# Patient Record
Sex: FEMALE
[De-identification: names, ages, dates, MRNs, and addresses within clinical notes are randomized per-mention and may not be internally consistent; named-entity substitution may affect disease eponyms.]

---

## 2017-06-12 ENCOUNTER — HOSPITAL (OUTPATIENT)
Dept: OTHER | Age: 62
End: 2017-06-12
Attending: FAMILY MEDICINE

## 2017-06-29 ENCOUNTER — HOSPITAL (OUTPATIENT)
Dept: OTHER | Age: 62
End: 2017-06-29
Attending: FAMILY MEDICINE

## 2017-10-05 ENCOUNTER — HOSPITAL (OUTPATIENT)
Dept: OTHER | Age: 62
End: 2017-10-05
Attending: INTERNAL MEDICINE

## 2018-02-09 ENCOUNTER — HOSPITAL (OUTPATIENT)
Dept: OTHER | Age: 63
End: 2018-02-09
Attending: INTERNAL MEDICINE

## 2018-09-06 ENCOUNTER — TELEPHONE (OUTPATIENT)
Dept: INTERNAL MEDICINE | Facility: CLINIC | Age: 63
End: 2018-09-06

## 2018-09-06 NOTE — TELEPHONE ENCOUNTER
----- Message from Ayana Woodard sent at 9/6/2018  4:00 PM CDT -----  Contact: SIDDHARTH LAZO [[id number]]            Name of Who is Calling: SIDDHARTH LAZO [[id number]]      What is the request in detail: patient would like to est care with doctor       Can the clinic reply by [facility]: no    What Number to Call Back if not [facility]: [phone]

## 2018-10-12 ENCOUNTER — OFFICE VISIT (OUTPATIENT)
Dept: INTERNAL MEDICINE | Facility: CLINIC | Age: 63
End: 2018-10-12
Attending: INTERNAL MEDICINE
Payer: MEDICARE

## 2018-10-12 VITALS
WEIGHT: 170.63 LBS | BODY MASS INDEX: 24.43 KG/M2 | DIASTOLIC BLOOD PRESSURE: 66 MMHG | HEART RATE: 80 BPM | HEIGHT: 70 IN | SYSTOLIC BLOOD PRESSURE: 123 MMHG | OXYGEN SATURATION: 97 %

## 2018-10-12 DIAGNOSIS — N95.9 MENOPAUSAL PROBLEM: ICD-10-CM

## 2018-10-12 DIAGNOSIS — Z12.31 ENCOUNTER FOR SCREENING MAMMOGRAM FOR MALIGNANT NEOPLASM OF BREAST: ICD-10-CM

## 2018-10-12 DIAGNOSIS — Z13.220 ENCOUNTER FOR LIPID SCREENING FOR CARDIOVASCULAR DISEASE: ICD-10-CM

## 2018-10-12 DIAGNOSIS — Z13.6 ENCOUNTER FOR LIPID SCREENING FOR CARDIOVASCULAR DISEASE: ICD-10-CM

## 2018-10-12 DIAGNOSIS — M85.80 OSTEOPENIA, UNSPECIFIED LOCATION: Primary | ICD-10-CM

## 2018-10-12 DIAGNOSIS — F33.42 RECURRENT MAJOR DEPRESSIVE DISORDER, IN FULL REMISSION: ICD-10-CM

## 2018-10-12 PROCEDURE — 99204 OFFICE O/P NEW MOD 45 MIN: CPT | Mod: S$PBB,,, | Performed by: INTERNAL MEDICINE

## 2018-10-12 PROCEDURE — 99214 OFFICE O/P EST MOD 30 MIN: CPT | Mod: PBBFAC | Performed by: INTERNAL MEDICINE

## 2018-10-12 PROCEDURE — 99999 PR PBB SHADOW E&M-EST. PATIENT-LVL IV: CPT | Mod: PBBFAC,,, | Performed by: INTERNAL MEDICINE

## 2018-10-12 RX ORDER — VENLAFAXINE HYDROCHLORIDE 150 MG/1
150 CAPSULE, EXTENDED RELEASE ORAL DAILY
Qty: 90 CAPSULE | Refills: 3 | Status: SHIPPED | OUTPATIENT
Start: 2018-10-12 | End: 2019-09-11 | Stop reason: SDUPTHER

## 2018-10-12 RX ORDER — VENLAFAXINE HYDROCHLORIDE 150 MG/1
150 CAPSULE, EXTENDED RELEASE ORAL DAILY
COMMUNITY
End: 2018-10-12 | Stop reason: SDUPTHER

## 2018-10-12 NOTE — PROGRESS NOTES
"Subjective:   Patient ID: Arben De Luna is a 63 y.o. female  Chief complaint:   Chief Complaint   Patient presents with    [facility]       HPI    Here to [facility] and for annual exam     Osteopenia: taking ca/vit d -- less exercise but working to improve this   - was on fosamax in past for short period of time - tez but concerned about pot SE - opted to stop Rx and manage as above     Depression: doing well on effexor - taking rx for a few years - no si/hi/rivera    Thinks utd on hep c screening   Tdap: due  Influenza: due  Pneumonia: pneumovax 23 and prevnar 13 - thinks had prevnar 13 recently   Shingles: due for shingrix    OA of knee and shoulders right > left - stable  Has right shoulder pain - stable     Review of Systems    Objective:  Vitals:    10/12/18 1302   BP: 123/66   Pulse: 80   SpO2: 97%   Weight: 77.4 kg (170 lb 10.2 oz)   Height: 5' 10" (1.778 m)     Body mass index is 24.48 kg/m².    Physical Exam   Constitutional: She is oriented to person, place, and time. She appears well-developed and well-nourished.   HENT:   Head: Normocephalic and atraumatic.   Right Ear: External ear normal.   Left Ear: External ear normal.   Nose: Nose normal.   Mouth/Throat: Oropharynx is clear and moist. No oropharyngeal exudate.   No carotid bruits   Eyes: Conjunctivae and EOM are normal.   Neck: Neck supple. No thyromegaly present.   Cardiovascular: Normal rate, regular rhythm, normal heart sounds and intact distal pulses.   Pulmonary/Chest: Effort normal and breath sounds normal.   Abdominal: Soft. Bowel sounds are normal.   Musculoskeletal: She exhibits no edema or tenderness.   Lymphadenopathy:     She has no cervical adenopathy.   Neurological: She is alert and oriented to person, place, and time.   Skin: Skin is warm and dry.   Psychiatric: Her behavior is normal. Thought content normal.   Vitals reviewed.      Assessment:  1. Annual physical exam    2. Osteopenia, unspecified location    3. Encounter for lipid " screening for cardiovascular disease    4. Recurrent major depressive disorder, in full remission    5. Menopausal problem    6. Encounter for screening mammogram for malignant neoplasm of breast        Plan:  Arben was seen today for establish care.    Diagnoses and all orders for this visit:    Annual physical exam  Recommend daily sunscreen, cardiovascular exercise min 30 min 5 days per week. Seatbelts routinely.    Osteopenia, unspecified location  -     Vitamin D; Future  -     TSH; Future  -     CBC auto differential; Future  -     Comprehensive metabolic panel; Future  -     DXA Bone Density Spine And Hip; Future  rec otc supplement of calcium 1200mg and vit d 800u divided into 2 doses daily along with reg exercise    Encounter for lipid screening for cardiovascular disease  -     TSH; Future  -     Lipid panel; Future    Recurrent major depressive disorder, in full remission  -     TSH; Future  Cont med, controlled     Menopausal problem  -     DXA Bone Density Spine And Hip; Future    Encounter for screening mammogram for malignant neoplasm of breast  -     Mammo Digital Screening Bilat with Tomosynthesis CAD; Future    Other orders  -     venlafaxine (EFFEXOR-XR) 150 MG Cp24; Take 1 capsule (150 mg total) by mouth once daily.      Health Maintenance   Topic Date Due    Hepatitis C Screening  1955    Lipid Panel  1955    TETANUS VACCINE  02/16/1971    Mammogram  02/16/1993    DEXA SCAN  02/16/1993    Zoster Vaccine  02/16/2013    Pneumococcal (65+) (1 of 2 - PCV13) 02/16/2018    Influenza Vaccine  08/01/2018    Colonoscopy  06/13/2028

## 2018-10-12 NOTE — PATIENT INSTRUCTIONS
Use a cotton ball dipped in mineral oil and place in the external canal for 10 to 20 minutes once per week (combined with eight hours of not using a hearing aid overnight, if applicable). This helps to liquefy the cerumen and aid the normal elimination mechanisms, thereby potentially reducing the number of visits per year for cerumen removal.  Routine cleaning of the ears by a health professional every 6 to 12 months is also suggested    Vaccines:  Tdap: due  Influenza: due  Pneumonia: pneumovax 23 and prevnar 13 - thinks had prevnar 13 recently   Shingles: shingrix     For constipation:   Increase fluids, fiber, exercise  Colace 100mg 1-2 times per day, miralax as needed   High-Fiber Diet  Fiber is in fruits, vegetables, cereals, and grains. Fiber passes through your body undigested. A high-fiber diet helps food move through your intestinal tract. The added bulk is helpful in preventing constipation. In people with diverticulosis, fiber helps clean out the pouches along the colon wall. It also prevents new pouches from forming. A high-fiber diet reduces the risk of colon cancer. It also lowers blood cholesterol and prevents high blood sugar in people with diabetes.    The fiber-rich foods listed below should be part of your diet. If you are not used to high-fiber foods, start with 1 or 2 foods from this list. Every 3 to 4 days add a new one to your diet. Do this until you are eating 4 high-fiber foods per day. This should give you 20 to 35 grams of fiber a day. It is also important to drink a lot of water when you are on this diet. You should have 6 to 8 glasses of water a day. Water makes the fiber swell and increases the benefit.  Foods high in dietary fiber  The following foods are high in dietary fiber:  · Breads. Breads made with 100% whole-wheat flour; mata, wheat, or rye crackers; whole-grain tortillas, bran muffins.  · Cereals. Whole-grain and bran cereals with bran (shredded wheat, wheat flakes, raisin  bran, corn bran); oatmeal, rolled oats, granola, and brown rice.  · Fruits. Fresh fruits and their edible skins (pears, prunes, raisins, berries, apples, and apricots); bananas, citrus fruit, mangoes, pineapple; and prune juice.  · Nuts. Any nuts and seeds.  · Vegetables. Best served raw or lightly cooked. All types, especially: green peas, celery, eggplant, potatoes, spinach, broccoli, Musella sprouts, winter squash, carrots, cauliflower, soybeans, lentils, and fresh and dried beans of all kinds.  · Other. Popcorn, any spices.  Date Last Reviewed: 8/1/2016 © 2000-2017 [employer]. [address] [place] [address]. All rights reserved. This information is not intended as a substitute for professional medical care. Always follow your healthcare professional's instructions.        Eating a High-Fiber Diet  Fiber is what gives strength and structure to plants. Most grains, beans, vegetables, and fruits contain fiber. Foods rich in fiber are often low in calories and fat, and they fill you up more. They may also reduce your risks for certain health problems. To find out the amount of fiber in canned, packaged, or frozen foods, read the Nutrition Facts label. It tells you how much fiber is in a serving.    Types of fiber and their benefits  There are two types of fiber: insoluble and soluble. They both aid digestion and help you maintain a healthy weight.  · Insoluble fiber. This is found in whole grains, cereals, certain fruits and vegetables such as apple skin, corn, and carrots. Insoluble fiber may prevent constipation and reduce the risk for certain types of cancer.  · Soluble fiber. This type of fiber is in oats, beans, and certain fruits and vegetables such as strawberries and peas. Soluble fiber can reduce cholesterol, which may help lower the risk for heart disease. It also helps control blood sugar levels.  Look for high-fiber foods  Try these foods to add fiber to your  diet:  · Whole-grain breads and cereals. Try to eat 6 to 8 ounces a day. Include wheat and oat bran cereals, whole-wheat muffins or toast, and corn tortillas in your meals.  · Fruits. Try to eat 2 cups a day. Apples, oranges, strawberries, pears, and bananas are good sources. (Note: Fruit juice is low in fiber.)  · Vegetables. Try to eat at least 2.5 cups a day. Add asparagus, carrots, broccoli, peas, and corn to your meals.  · Beans. One cup of cooked lentils gives you over 15 grams of fiber. Try navy beans, lentils, and chickpeas.  · Seeds. A small handful of seeds gives you about 3 grams of fiber. Try sunflower seeds.  Keep track of your fiber  Keep track of how much fiber you eat. Start by reading food labels. Then eat a variety of foods high in fiber. As you begin to eat more fiber, ask your healthcare provider how much water you should be drinking to keep your digestive system working smoothly.  You should aim for a certain amount of fiber in your diet each day. If you are a woman, that amount is between 25 and 28 grams per day. Men should aim for 30 to 33 grams per day. After age 50, your daily fiber needs drop to 22 grams for women and 28 grams for men.  Before you reach for the fiber supplements, think about this. Fiber is found naturally in healthy whole foods. It gives you that feeling of fullness after you eat. Taking fiber supplements or eating fiber-enriched foods will not give you this full feeling.  Your fiber intake is a good measure for the quality of your overall diet. If you are missing out on your daily amount of fiber, you may be lacking other important nutrients as well.  Date Last Reviewed: 5/11/2015  © 2721-4066 [employer]. [address] [place] [address], [place] [address]. All rights reserved. This information is not intended as a substitute for professional medical care. Always follow your healthcare professional's instructions.        Constipation (Adult)  Constipation means that  you have bowel movements that are less frequent than usual. Stools often become very hard and difficult to pass.  Constipation is very common. At some point in life it affects almost everyone. Since everyone's bowel habits are different, what is constipation to one person may not be to another. Your healthcare provider may do tests to diagnose constipation. It depends on what he or she finds when evaluating you.    Symptoms of constipation include:  · Abdominal pain  · Bloating  · Vomiting  · Painful bowel movements  · Itching, swelling, bleeding, or pain around the anus  Causes  Constipation can have many causes. These include:  · Diet low in fiber  · Too much dairy  · Not drinking enough liquids  · Lack of exercise or physical activity. This is especially true for older adults.  · Changes in lifestyle or daily routine, including pregnancy, aging, work, and travel  · Frequent use or misuse of laxatives  · Ignoring the urge to have a bowel movement or delaying it until later  · Medicines, such as certain prescription pain medicines, iron supplements, antacids, certain antidepressants, and calcium supplements  · Diseases like irritable bowel syndrome, bowel obstructions, stroke, diabetes, thyroid disease, Parkinson disease, hemorrhoids, and colon cancer  Complications  Potential complications of constipation can include:  · Hemorrhoids  · Rectal bleeding from hemorrhoids or anal fissures (skin tears)  · Hernias  · Dependency on laxatives  · Chronic constipation  · Fecal impaction  · Bowel obstruction or perforation  Home care  All treatment should be done after talking with your healthcare provider. This is especially true if you have another medical problems, are taking prescription medicines, or are an older adult. Treatment most often involves lifestyle changes. You may also need medicines. Your healthcare provider will tell you which will work best for you. Follow the advice below to help avoid this problem in  the future.  Lifestyle changes  These lifestyle changes can help prevent constipation:  · Diet. Eat a high-fiber diet, with fresh fruit and vegetables, and reduce dairy intake, meats, and processed foods  · Fluids. It's important to get enough fluids each day. Drink plenty of water when you eat more fiber. If you are on diet that limits the amount of fluid you can have, talk about this with your healthcare provider.  · Regular exercise. Check with your healthcare provider first.  Medications  Take any medicines as directed. Some laxatives are safe to use only every now and then. Others can be taken on a regular basis. Talk with your doctor or pharmacist if you have questions.  Prescription pain medicines can cause constipation. If you are taking this kind of medicine, ask your healthcare provider if you should also take a stool softener.  Medicines you may take to treat constipation include:  · Fiber supplements  · Stool softeners  · Laxatives  · Enemas  · Rectal suppositories  Follow-up care  Follow up with your healthcare provider if symptoms don't get better in the next few days. You may need to have more tests or see a specialist.  Call 911  Call 911 if any of these occur:  · Trouble breathing  · Stiff, rigid abdomen that is severely painful to touch  · Confusion  · Fainting or loss of consciousness  · Rapid heart rate  · Chest pain  When to seek medical advice  Call your healthcare provider right away if any of these occur:  · Fever over 100.4°F (38°C)  · Failure to resume normal bowel movements  · Pain in your abdomen or back gets worse  · Nausea or vomiting  · Swelling in your abdomen  · Blood in the stool  · Black, tarry stool  · Involuntary weight loss  · Weakness  Date Last Reviewed: 12/30/2015  © 6841-4130 [employer]. [address] [place] [address]. All rights reserved. This information is not intended as a substitute for professional medical care. Always follow your healthcare  professional's instructions.

## 2018-10-13 PROBLEM — M85.80 OSTEOPENIA: Status: ACTIVE | Noted: 2018-10-13

## 2018-10-13 PROBLEM — F33.42 RECURRENT MAJOR DEPRESSIVE DISORDER, IN FULL REMISSION: Status: ACTIVE | Noted: 2018-10-13

## 2018-10-17 ENCOUNTER — HOSPITAL ENCOUNTER (OUTPATIENT)
Dept: RADIOLOGY | Facility: OTHER | Age: 63
Discharge: HOME | End: 2018-10-17
Attending: INTERNAL MEDICINE
Payer: MEDICARE

## 2018-10-17 DIAGNOSIS — Z12.31 ENCOUNTER FOR SCREENING MAMMOGRAM FOR MALIGNANT NEOPLASM OF BREAST: ICD-10-CM

## 2018-10-17 DIAGNOSIS — N95.9 MENOPAUSAL PROBLEM: ICD-10-CM

## 2018-10-17 DIAGNOSIS — M85.80 OSTEOPENIA, UNSPECIFIED LOCATION: ICD-10-CM

## 2018-10-17 PROCEDURE — 77067 SCR MAMMO BI INCL CAD: CPT | Mod: 26,,, | Performed by: RADIOLOGY

## 2018-10-17 PROCEDURE — 77080 DXA BONE DENSITY AXIAL: CPT | Mod: 26,,, | Performed by: RADIOLOGY

## 2018-10-17 PROCEDURE — 77063 BREAST TOMOSYNTHESIS BI: CPT | Mod: 26,,, | Performed by: RADIOLOGY

## 2018-10-17 PROCEDURE — 77080 DXA BONE DENSITY AXIAL: CPT | Mod: TC

## 2018-10-17 PROCEDURE — 77063 BREAST TOMOSYNTHESIS BI: CPT | Mod: TC

## 2018-11-16 ENCOUNTER — TELEPHONE (OUTPATIENT)
Dept: RADIOLOGY | Facility: OTHER | Age: 63
End: 2018-11-16

## 2018-11-16 NOTE — TELEPHONE ENCOUNTER
Discussed with patient outstanding request for previous mammo images. Informed if no answer from previous facility by Monday the reading radiologist will likely recommend diagnostic mammo and US. Will follow up.

## 2018-11-27 ENCOUNTER — TELEPHONE (OUTPATIENT)
Dept: RADIOLOGY | Facility: OTHER | Age: 63
End: 2018-11-27

## 2018-11-27 NOTE — TELEPHONE ENCOUNTER
Called to notify patient of mammo image comparisons, left voicemail. Patient is not a recall, will mail results letter.

## 2018-11-27 NOTE — TELEPHONE ENCOUNTER
Sonya from [facility] in [place] called to confirm imaging disk has been mailed twice. The most recent disk was mailed 11/20. Confirmed address. Will follow up if disk not received by 11/29.

## 2018-11-29 ENCOUNTER — PATIENT MESSAGE (OUTPATIENT)
Dept: INTERNAL MEDICINE | Facility: CLINIC | Age: 63
End: 2018-11-29

## 2018-11-29 DIAGNOSIS — Z11.59 NEED FOR HEPATITIS C SCREENING TEST: ICD-10-CM

## 2018-12-01 NOTE — TELEPHONE ENCOUNTER
Called pt and reviewed concerns - reviewed mmg reports and rec is to repeat in 1 year - she will review her letter to make sure recs are same and let me know if her letter contains different rec for f/u     Did not receive prior dexa - reviewed recs - she will consider fosamax due to inc risk of fx but does not want to start at this time - will let me know if reconsiders  - did review pot SE of meds and how to take med safely and correctly should she change mind    Due for prevnar 13 6/2019  Discussed rec flu vaccine for 65+  Reviewed rec for hep c screening and not shot     - she plans to schedule f/u appt with me in near future and will schedule hep c lab at that time     All questions were answered and pt verbalized understanding of plan.

## 2019-02-11 ENCOUNTER — OFFICE VISIT (OUTPATIENT)
Dept: INTERNAL MEDICINE | Facility: CLINIC | Age: 64
End: 2019-02-11
Payer: MEDICARE

## 2019-02-11 VITALS
DIASTOLIC BLOOD PRESSURE: 80 MMHG | BODY MASS INDEX: 25.09 KG/M2 | HEIGHT: 70 IN | WEIGHT: 175.25 LBS | SYSTOLIC BLOOD PRESSURE: 110 MMHG | HEART RATE: 75 BPM | OXYGEN SATURATION: 100 %

## 2019-02-11 DIAGNOSIS — Z11.59 NEED FOR HEPATITIS C SCREENING TEST: ICD-10-CM

## 2019-02-11 DIAGNOSIS — M25.561 RIGHT KNEE PAIN, UNSPECIFIED CHRONICITY: Primary | ICD-10-CM

## 2019-02-11 PROCEDURE — 99999 PR PBB SHADOW E&M-EST. PATIENT-LVL IV: CPT | Mod: PBBFAC,,, | Performed by: NURSE PRACTITIONER

## 2019-02-11 PROCEDURE — 99213 PR OFFICE/OUTPT VISIT, EST, LEVL III, 20-29 MIN: ICD-10-PCS | Mod: S$PBB,,, | Performed by: NURSE PRACTITIONER

## 2019-02-11 PROCEDURE — 99214 OFFICE O/P EST MOD 30 MIN: CPT | Mod: PBBFAC | Performed by: NURSE PRACTITIONER

## 2019-02-11 PROCEDURE — 99999 PR PBB SHADOW E&M-EST. PATIENT-LVL IV: ICD-10-PCS | Mod: PBBFAC,,, | Performed by: NURSE PRACTITIONER

## 2019-02-11 PROCEDURE — 99213 OFFICE O/P EST LOW 20 MIN: CPT | Mod: S$PBB,,, | Performed by: NURSE PRACTITIONER

## 2019-02-11 RX ORDER — MELOXICAM 15 MG/1
15 TABLET ORAL DAILY
Qty: 10 TABLET | Refills: 0 | Status: SHIPPED | OUTPATIENT
Start: 2019-02-11 | End: 2019-02-21

## 2019-02-11 RX ORDER — DICLOFENAC SODIUM 10 MG/G
2 GEL TOPICAL 3 TIMES DAILY
Qty: 100 G | Refills: 0 | Status: SHIPPED | OUTPATIENT
Start: 2019-02-11 | End: 2019-03-25

## 2019-02-11 NOTE — PATIENT INSTRUCTIONS
Mobic15 mg po daily x 10 days.  Voltaren topical gel to R knee TID x 10 days.  RICE(rest, ice, compression, elevation).  PT.  Consider imaging.  F/U with PCP.  F/U with Ortho.  RTC prn.      Reducing Knee Pain and Swelling    Many treatments can help reduce pain and swelling in your knee. Your healthcare provider or physical therapist may suggest one or more of the following treatments:  · Icing your knee helps reduce swelling. You may be asked to ice your knee once a day or more. Apply ice for about 15 to 20 minutes at a time, with at least 40 minutes between sessions. Always keep a towel between the ice and your skin.   · Keeping your leg raised above your heart helps excess fluid flow out of your knee joint. This reduces swelling.  · Compression means wrapping an elastic bandage or neoprene sleeve snugly around your knees. This keeps fluid from collecting in your knee joint.  · Electrical stimulation, done by a physical therapist or , can help reduce excess fluid in your knee joint.  · Anti-inflammatory medicines may be prescribed by your healthcare provider. You may take pills or receive injections in your knee.  · Isometric (maday) exercises strengthen the muscles that support your knee joint. They also help reduce excess fluid in your knee.  · Massage helps fluid drain away from your knee.  Date Last Reviewed: 10/13/2015  © 9833-1518 [employer]. [address] [place] [address], [place] [address]. All rights reserved. This information is not intended as a substitute for professional medical care. Always follow your healthcare professional's instructions.

## 2019-02-11 NOTE — PROGRESS NOTES
"Subjective:       Patient ID: Arben De Luna is a 63 y.o. female.    Chief Complaint: Knee Pain (right knee pain x 1 month)    Patient endorses exercising regurlarly (yoga).       Knee Pain    The incident occurred more than 1 week ago (Right knee pain. One month ago). There was no injury mechanism. The pain is present in the right knee. The quality of the pain is described as aching ("creaking"). The pain is at a severity of 5/10. The pain is mild. The pain has been constant ("feels unsteady") since onset. Pertinent negatives include no inability to bear weight, loss of motion, loss of sensation, muscle weakness, numbness or tingling. She reports no foreign bodies present. The symptoms are aggravated by movement. Treatments tried: Advil and warm bath. The treatment provided significant relief.          Past Medical History: Patient has a past medical history of Actinic keratoses, Arthritis, Cherry angioma, Depression, History of colonic polyps - adenomatous polyps, Osteopenia, Plantar fasciitis, Seborrheic keratoses, and Squamous cell carcinoma in situ.    Past Surgical History: Patient has no past surgical history on file.    Social History: Patient reports that she has quit smoking. She has a 1.00 pack-year smoking history. she has never used smokeless tobacco. She reports that she drinks alcohol. She reports that she does not use drugs.    Family History: family history includes ALS in her brother; Cancer in her brother, father, and sister; Depression in her sister; Heart disease in her mother; Hyperlipidemia in her mother; Hypertension in her mother; No Known Problems in her brother, brother, brother, daughter, sister, sister, sister, and son; Other in her sister; Stroke in her mother.    Medications:   Current Outpatient Medications   Medication Sig    venlafaxine (EFFEXOR-XR) 150 MG Cp24 Take 1 capsule (150 mg total) by mouth once daily.    diclofenac sodium (VOLTAREN) 1 % Gel Apply 2 g topically 3 (three) " "times daily. for 10 days    meloxicam (MOBIC) 15 MG tablet Take 1 tablet (15 mg total) by mouth once daily. for 10 days     No current facility-administered medications for this visit.        Allergies: Patient has No Known Allergies.    Review of Systems   Constitutional: Negative for activity change, appetite change, fatigue, fever and unexpected weight change.   HENT: Negative for congestion, dental problem, ear discharge, ear pain, facial swelling, hearing loss, nosebleeds, postnasal drip, rhinorrhea, sinus pressure, sneezing, sore throat, tinnitus, trouble swallowing and voice change.    Eyes: Negative for pain and visual disturbance.   Respiratory: Negative for cough, chest tightness, shortness of breath, wheezing and stridor.    Cardiovascular: Negative for chest pain.   Musculoskeletal: Negative for gait problem and neck pain.        R knee pain   Skin: Negative for color change and rash.   Allergic/Immunologic: Negative for environmental allergies.   Neurological: Negative for dizziness, tingling, seizures, syncope, facial asymmetry, speech difficulty, weakness, light-headedness, numbness and headaches.   Psychiatric/Behavioral: Negative for agitation and confusion. The patient is not nervous/anxious.        Objective:       /80 (BP Location: Right arm, Patient Position: Sitting, BP Method: Medium (Manual))   Pulse 75   Ht 5' 10" (1.778 m)   Wt 79.5 kg (175 lb 4.3 oz)   SpO2 100%   BMI 25.15 kg/m²     Physical Exam   Constitutional: She is oriented to person, place, and time. She appears well-developed and well-nourished.   HENT:   Head: Normocephalic and atraumatic. Not macrocephalic and not microcephalic. Head is without raccoon's eyes, without Haines's sign, without abrasion, without contusion, without laceration, without right periorbital erythema and without left periorbital erythema. Hair is normal.   Right Ear: No lacerations. No drainage, swelling or tenderness. No foreign bodies. No " mastoid tenderness. Tympanic membrane is not injected, not scarred, not perforated, not erythematous, not retracted and not bulging. Tympanic membrane mobility is normal. No middle ear effusion. No hemotympanum. No decreased hearing is noted.   Left Ear: No lacerations. No drainage, swelling or tenderness. No foreign bodies. No mastoid tenderness. Tympanic membrane is not injected, not scarred, not perforated, not erythematous, not retracted and not bulging. Tympanic membrane mobility is normal.  No middle ear effusion. No hemotympanum. No decreased hearing is noted.   Nose: Nose normal. No mucosal edema, rhinorrhea, nose lacerations, sinus tenderness or nasal deformity.   Mouth/Throat: Uvula is midline.   Eyes: Conjunctivae and lids are normal. No scleral icterus.   Neck: Trachea normal. Neck supple. No spinous process tenderness and no muscular tenderness present. No neck rigidity. No edema, no erythema and normal range of motion present. No thyroid mass and no thyromegaly present.   Cardiovascular: Normal rate, regular rhythm, normal heart sounds and intact distal pulses. Exam reveals no gallop and no friction rub.   No murmur heard.  Pulmonary/Chest: Effort normal and breath sounds normal. No stridor. No respiratory distress. She has no wheezes. She has no rales. She exhibits no tenderness.   Abdominal: Soft. Bowel sounds are normal. She exhibits no distension.   Musculoskeletal: Normal range of motion.        Right knee: She exhibits swelling (mild). She exhibits normal range of motion, no ecchymosis, no erythema, normal patellar mobility and no bony tenderness. No tenderness found.   Lymphadenopathy:        Head (right side): No submental, no submandibular, no tonsillar, no preauricular and no posterior auricular adenopathy present.        Head (left side): No submental, no submandibular, no tonsillar, no preauricular, no posterior auricular and no occipital adenopathy present.   Neurological: She is alert and  oriented to person, place, and time.   Skin: Skin is warm and dry.   Psychiatric: She has a normal mood and affect. Her behavior is normal. Judgment and thought content normal.   Vitals reviewed.      Assessment:       1. Right knee pain, unspecified chronicity    2. Need for hepatitis C screening test        Plan:       Mobic15 mg po daily x 10 days.  Voltaren topical gel to R knee TID x 10 days.  RICE(rest, ice, compression, elevation).  PT.  Consider imaging.  F/U with PCP.  F/U with Ortho.  RTC prn.

## 2019-02-25 ENCOUNTER — OFFICE VISIT (OUTPATIENT)
Dept: INTERNAL MEDICINE | Facility: CLINIC | Age: 64
End: 2019-02-25
Payer: MEDICARE

## 2019-02-25 ENCOUNTER — OFFICE VISIT (OUTPATIENT)
Dept: ORTHOPEDICS | Facility: CLINIC | Age: 64
End: 2019-02-25
Payer: MEDICARE

## 2019-02-25 ENCOUNTER — HOSPITAL ENCOUNTER (OUTPATIENT)
Dept: RADIOLOGY | Facility: HOSPITAL | Age: 64
Discharge: HOME | End: 2019-02-25
Attending: PHYSICIAN ASSISTANT
Payer: MEDICARE

## 2019-02-25 VITALS
SYSTOLIC BLOOD PRESSURE: 110 MMHG | OXYGEN SATURATION: 99 % | DIASTOLIC BLOOD PRESSURE: 62 MMHG | HEIGHT: 70 IN | BODY MASS INDEX: 25.47 KG/M2 | HEART RATE: 71 BPM | WEIGHT: 177.94 LBS

## 2019-02-25 VITALS — HEIGHT: 70 IN | WEIGHT: 177.94 LBS | BODY MASS INDEX: 25.47 KG/M2

## 2019-02-25 DIAGNOSIS — M25.561 RIGHT KNEE PAIN, UNSPECIFIED CHRONICITY: Primary | ICD-10-CM

## 2019-02-25 DIAGNOSIS — M25.561 RIGHT KNEE PAIN, UNSPECIFIED CHRONICITY: ICD-10-CM

## 2019-02-25 DIAGNOSIS — M17.11 PRIMARY OSTEOARTHRITIS OF RIGHT KNEE: ICD-10-CM

## 2019-02-25 PROCEDURE — 99999 PR PBB SHADOW E&M-EST. PATIENT-LVL III: CPT | Mod: PBBFAC,,, | Performed by: PHYSICIAN ASSISTANT

## 2019-02-25 PROCEDURE — 99999 PR PBB SHADOW E&M-EST. PATIENT-LVL III: ICD-10-PCS | Mod: PBBFAC,,, | Performed by: PHYSICIAN ASSISTANT

## 2019-02-25 PROCEDURE — 73562 X-RAY EXAM OF KNEE 3: CPT | Mod: 26,XS,RT, | Performed by: RADIOLOGY

## 2019-02-25 PROCEDURE — 73564 XR KNEE ORTHO RIGHT WITH FLEXION: ICD-10-PCS | Mod: 26,RT,, | Performed by: RADIOLOGY

## 2019-02-25 PROCEDURE — 99203 PR OFFICE/OUTPT VISIT, NEW, LEVL III, 30-44 MIN: ICD-10-PCS | Mod: 25,S$PBB,, | Performed by: PHYSICIAN ASSISTANT

## 2019-02-25 PROCEDURE — 99203 OFFICE O/P NEW LOW 30 MIN: CPT | Mod: 25,S$PBB,, | Performed by: PHYSICIAN ASSISTANT

## 2019-02-25 PROCEDURE — 20610 PR DRAIN/INJECT LARGE JOINT/BURSA: ICD-10-PCS | Mod: S$PBB,RT,, | Performed by: PHYSICIAN ASSISTANT

## 2019-02-25 PROCEDURE — 99213 OFFICE O/P EST LOW 20 MIN: CPT | Mod: PBBFAC,25 | Performed by: PHYSICIAN ASSISTANT

## 2019-02-25 PROCEDURE — 99213 OFFICE O/P EST LOW 20 MIN: CPT | Mod: S$PBB,,, | Performed by: NURSE PRACTITIONER

## 2019-02-25 PROCEDURE — 20610 DRAIN/INJ JOINT/BURSA W/O US: CPT | Mod: S$PBB,RT,, | Performed by: PHYSICIAN ASSISTANT

## 2019-02-25 PROCEDURE — 99213 PR OFFICE/OUTPT VISIT, EST, LEVL III, 20-29 MIN: ICD-10-PCS | Mod: S$PBB,,, | Performed by: NURSE PRACTITIONER

## 2019-02-25 PROCEDURE — 20610 DRAIN/INJ JOINT/BURSA W/O US: CPT | Mod: PBBFAC | Performed by: PHYSICIAN ASSISTANT

## 2019-02-25 PROCEDURE — 99999 PR PBB SHADOW E&M-EST. PATIENT-LVL III: CPT | Mod: PBBFAC,,, | Performed by: NURSE PRACTITIONER

## 2019-02-25 PROCEDURE — 73562 XR KNEE ORTHO RIGHT WITH FLEXION: ICD-10-PCS | Mod: 26,XS,RT, | Performed by: RADIOLOGY

## 2019-02-25 PROCEDURE — 99999 PR PBB SHADOW E&M-EST. PATIENT-LVL III: ICD-10-PCS | Mod: PBBFAC,,, | Performed by: NURSE PRACTITIONER

## 2019-02-25 PROCEDURE — 73562 X-RAY EXAM OF KNEE 3: CPT | Mod: TC,RT

## 2019-02-25 PROCEDURE — 99213 OFFICE O/P EST LOW 20 MIN: CPT | Mod: PBBFAC,25,27 | Performed by: NURSE PRACTITIONER

## 2019-02-25 PROCEDURE — 73564 X-RAY EXAM KNEE 4 OR MORE: CPT | Mod: 26,RT,, | Performed by: RADIOLOGY

## 2019-02-25 RX ORDER — TRIAMCINOLONE ACETONIDE 40 MG/ML
40 INJECTION, SUSPENSION INTRA-ARTICULAR; INTRAMUSCULAR
Status: COMPLETED | OUTPATIENT
Start: 2019-02-25 | End: 2019-02-25

## 2019-02-25 RX ADMIN — TRIAMCINOLONE ACETONIDE 40 MG: 40 INJECTION, SUSPENSION INTRA-ARTICULAR; INTRAMUSCULAR at 07:02

## 2019-02-25 NOTE — PROGRESS NOTES
Subjective:       Patient ID: Arben De Luna is a 64 y.o. female.    Chief Complaint: Knee Pain    HPI   Arben De Luna is a 65 y/o CF who presents today with c/o R knee pain. Symptoms stable. Associated symptoms include mild knee swelling. She was seen on 2/11/19 for knee pain. Pain is rated 6/10. She reports that previous treatment plan of (RICE, Mobic, Volatren gel)did not alleviate symptoms. Physical therapy appointment scheduled for tomorrow.    Past Medical History: Patient has a past medical history of Actinic keratoses, Arthritis, Cherry angioma, Depression, History of colonic polyps - adenomatous polyps, Osteopenia, Plantar fasciitis, Seborrheic keratoses, and Squamous cell carcinoma in situ.    Past Surgical History: Patient has no past surgical history on file.    Social History: Patient reports that she has quit smoking. She has a 1.00 pack-year smoking history. she has never used smokeless tobacco. She reports that she drinks alcohol. She reports that she does not use drugs.    Family History: family history includes ALS in her brother; Cancer in her brother, father, and sister; Depression in her sister; Heart disease in her mother; Hyperlipidemia in her mother; Hypertension in her mother; No Known Problems in her brother, brother, brother, daughter, sister, sister, sister, and son; Other in her sister; Stroke in her mother.    Medications:   Current Outpatient Medications   Medication Sig    venlafaxine (EFFEXOR-XR) 150 MG Cp24 Take 1 capsule (150 mg total) by mouth once daily.    diclofenac sodium (VOLTAREN) 1 % Gel Apply 2 g topically 3 (three) times daily. for 10 days     No current facility-administered medications for this visit.        Allergies: Patient has No Known Allergies.    Review of Systems   Constitutional: Negative for activity change, appetite change, fatigue, fever and unexpected weight change.   HENT: Negative for congestion, dental problem, ear discharge, ear pain, facial swelling,  "hearing loss, nosebleeds, postnasal drip, rhinorrhea, sinus pressure, sneezing, sore throat, tinnitus, trouble swallowing and voice change.    Eyes: Negative for pain and visual disturbance.   Respiratory: Negative for cough, chest tightness, shortness of breath, wheezing and stridor.    Cardiovascular: Negative for chest pain.   Musculoskeletal: Negative for gait problem and neck pain.        R knee pain   Skin: Negative for color change and rash.   Allergic/Immunologic: Negative for environmental allergies.   Neurological: Negative for dizziness, seizures, syncope, facial asymmetry, speech difficulty, weakness, light-headedness, numbness and headaches.   Psychiatric/Behavioral: Negative for agitation and confusion. The patient is not nervous/anxious.        Objective:       /62 (BP Location: Right arm, Patient Position: Sitting)   Pulse 71   Ht 5' 10" (1.778 m)   Wt 80.7 kg (177 lb 14.6 oz)   SpO2 99%   BMI 25.53 kg/m²     Physical Exam   Constitutional: She is oriented to person, place, and time. She appears well-developed and well-nourished.   HENT:   Head: Normocephalic and atraumatic. Not macrocephalic and not microcephalic. Head is without raccoon's eyes, without Haines's sign, without abrasion, without contusion, without laceration, without right periorbital erythema and without left periorbital erythema. Hair is normal.   Right Ear: No lacerations. No drainage, swelling or tenderness. No foreign bodies. No mastoid tenderness. Tympanic membrane is not injected, not scarred, not perforated, not erythematous, not retracted and not bulging. Tympanic membrane mobility is normal. No middle ear effusion. No hemotympanum. No decreased hearing is noted.   Left Ear: No lacerations. No drainage, swelling or tenderness. No foreign bodies. No mastoid tenderness. Tympanic membrane is not injected, not scarred, not perforated, not erythematous, not retracted and not bulging. Tympanic membrane mobility is " normal.  No middle ear effusion. No hemotympanum. No decreased hearing is noted.   Nose: Nose normal. No mucosal edema, rhinorrhea, nose lacerations, sinus tenderness or nasal deformity.   Mouth/Throat: Uvula is midline.   Eyes: Conjunctivae and lids are normal. No scleral icterus.   Neck: Trachea normal. Neck supple. No spinous process tenderness and no muscular tenderness present. No neck rigidity. No edema, no erythema and normal range of motion present. No thyroid mass and no thyromegaly present.   Cardiovascular: Normal rate, regular rhythm, normal heart sounds and intact distal pulses. Exam reveals no gallop and no friction rub.   No murmur heard.  Pulmonary/Chest: Effort normal and breath sounds normal. No stridor. No respiratory distress. She has no wheezes. She has no rales. She exhibits no tenderness.   Abdominal: Soft. Bowel sounds are normal. She exhibits no distension.   Musculoskeletal: Normal range of motion.        Right knee: She exhibits swelling and erythema. She exhibits normal range of motion, no effusion, no ecchymosis, no deformity, no laceration, normal alignment and no LCL laxity. No tenderness found. No medial joint line and no lateral joint line tenderness noted.   Lachman's test: negative  Posterior drawer: negative  Medial collateral ligament: negative  Lateral collateral ligament: negative   Yunior's Test: Negative   Lymphadenopathy:        Head (right side): No submental, no submandibular, no tonsillar, no preauricular and no posterior auricular adenopathy present.        Head (left side): No submental, no submandibular, no tonsillar, no preauricular, no posterior auricular and no occipital adenopathy present.   Neurological: She is alert and oriented to person, place, and time. No cranial nerve deficit.   Skin: Skin is warm and dry.   Psychiatric: She has a normal mood and affect. Her behavior is normal. Judgment and thought content normal.   Vitals reviewed.      Assessment:        1. Right knee pain, unspecified chronicity        Plan:       X-ray.  NSAID's prn.  RICE (rest ice compression elevation).  Hold off on PT until Ortho eval.  F/U with Ortho (appointment scheduled for this evening).  F/U with PCP.  RTC prn.

## 2019-02-25 NOTE — LETTER
February 26, 2019      Jamel Li, NP  [address]  [address]  [facility] [address]           [facility]  [address]  [facility] [address]  Phone: [phone]  Fax: [phone]          Patient: Arben De Luna   MR Number: [id number]   YOB: 1955   Date of Visit: 2/25/2019       Dear Jamel Li:    Thank you for referring Arben De Luna to me for evaluation. Attached you will find relevant portions of my assessment and plan of care.    If you have questions, please do not hesitate to call me. I look forward to following Arben De Luna along with you.    Sincerely,    Maria Antonia Tesfaye PA-C    Enclosure  CC:  No Recipients    If you would like to receive this communication electronically, please contact [email or web address] or [phone] to request more information on [employer] access.    For providers and/or their staff who would like to refer a patient to [facility], please contact us through our one-stop-shop provider referral line, [facility], at [phone].    If you feel you have received this communication in error or would no longer like to receive these types of communications, please e-mail [email or web address]

## 2019-02-26 ENCOUNTER — CLINICAL SUPPORT (OUTPATIENT)
Dept: REHABILITATION | Facility: OTHER | Age: 64
End: 2019-02-26
Payer: MEDICARE

## 2019-02-26 DIAGNOSIS — M25.561 ACUTE PAIN OF RIGHT KNEE: ICD-10-CM

## 2019-02-26 DIAGNOSIS — M25.661 JOINT STIFFNESS OF KNEE, RIGHT: ICD-10-CM

## 2019-02-26 PROCEDURE — 97161 PT EVAL LOW COMPLEX 20 MIN: CPT | Mod: PN | Performed by: PHYSICAL THERAPIST

## 2019-02-26 NOTE — PLAN OF CARE
[facility]  Physical Therapy Initial Evaluation    Name: Arben De Luna  Clinic Number: [id number]    Therapy Diagnosis:   Encounter Diagnoses   Name Primary?    Acute pain of right knee     Joint stiffness of knee, right      Physician: Jamel Li, ASHLEY    Physician Orders: PT Eval and Treat   Medical Diagnosis from Referral: M25.561 (ICD-10-CM) - Right knee pain, unspecified chronicity   Evaluation Date: 2/26/2019  Authorization Period Expiration: 2/11/2020  Plan of Care Expiration: 5/24/2019  Visit # / Visits authorized: 1/ 1    Time In: 2:05 PM  Time Out: 2:45 PM  Total Billable Time: 40 minutes    Precautions: Standard    Subjective   Date of onset: 1/11/2019  History of current condition - Arben reports: Started having pain while walking on treadmill on an incline, got off and moved to stair climber. Pt had just initiated regular gym program about a month previously. Pain hasn't really stopped since then. 2 weeks ago got topical gel and medication for arthritis, but no relief. Saw orthopedics yesterday, X-ray reveals mild arthritis. Cortisone injection administered yesterday. Pt reports pain moves around in knee, sometimes anterior, sometimes posterior. Has sporadically been going to yoga and water aerobics.       Past Medical History:   Diagnosis Date    Actinic keratoses     Arthritis     right knee     Cherry angioma     Depression     History of colonic polyps - adenomatous polyps     1 tub adenoma on cscope 2018    Osteopenia     Plantar fasciitis     Seborrheic keratoses     Squamous cell carcinoma in situ     right arm     Arben De Luna  has no past surgical history on file.    Arben has a current medication list which includes the following prescription(s): diclofenac sodium and venlafaxine.    Review of patient's allergies indicates:  No Known Allergies     Imaging, X-ray: FINDINGS: Mild valgus morphology.  No significant joint effusion.  No acute fracture or  suspicious osseous lesion.  Mild narrowing of the medial tibiofemoral joint spaces.  Mild marginal osteophytosis.  Remaining joint spaces are satisfactorily preserved.  Soft tissues appear normal.    Prior Therapy: none  Social History: Pt lives with their spouse in second story apartment  Occupation: part-time  at [employer]   Prior Level of Function: Independent with ADL's and driving  Current Level of Function: Independent with ADL's. Some difficulty getting off of floor at school    Pain:  Current 2/10, worst 3/10, best 2/10   Location: right knee   Description: Aching  Aggravating Factors: first few steps on rising, stairs (worse descending), getting OOB in AM  Easing Factors: ice and elevation    Pts goals: gets some exercises to be able to return to gym    Objective     Observation: pt with bandaid to lateral distal knee, she removes and small bruise noted from injection last night        Range of Motion: equal and WFL bilaterally        Lower Extremity Strength  Right LE  Left LE    Ankle dorsiflexion: 5/5 Ankle dorsiflexion: 5/5   Ankle plantarflexion: 5/5 Ankle plantarflexion: 5/5   Knee extension: 5/5 Knee extension: 5/5   Knee flexion: 5/5 Knee flexion: 5/5   Hip flexion: 4-/5 Hip flexion: 4-/5   Hip external rotation 4+/5 Hip external rotation 4+/5   Hip internal rotation 4/5 Hip internal rotation 4/5   Hip abduction:  4/5 Hip abduction: 4/5   Hip adduction: 4+/5 Hip adduction: 4/5   Hip extension: 4/5 Hip extension 4/5           Special Tests:   Left Right   Thessaly's Test - -   Patellar Grind Test - +       Joint Mobility: crepitus with patellar mobilization noted    Palpation: tenderness to distal quad, popliteus, patellar tendon, lateral HS, ITB, pes anserine    Sensation: grossly intact to light touch    Flexibility:    Hamstring: R = mild; L = mild   Quad: R = mild; L = slight   Piriformis: R: mild; L slight   Ariel test: R = mild ; L = full        CMS  Impairment/Limitation/Restriction for FOTO Knee Survey    Therapist reviewed FOTO scores for Arben De Luna on 2/26/2019.   FOTO documents entered into [employer] - see Media section.    Limitation Score: 42%  Category: Mobility    Current : CK = at least 40% but < 60% impaired, limited or restricted  Goal: CJ = at least 20% but < 40% impaired, limited or restricted  Discharge: n/a         TREATMENT   Treatment Time In: 2:35 PM  Treatment Time Out: 2:50 PM  Total Treatment time separate from Evaluation: 15 minutes    Arben received therapeutic exercises to develop strength for 7 minutes including:  Reviewed and demonstrated for HEP  SLR flex x 5  SLR abd x 5  SLR ext x 5    Arben received the following manual therapy techniques:   8 minutes x preparation and application of Kineseotape to R knee. Ystrip for patellar tracking, I strip for decompression and pain relief. Patient received education regarding appropriate care and removal of Kinesiotape. Patient instructed in proper removal techniques if skin irritation occurs.      Home Exercises and Patient Education Provided    Education provided:   - Therapy rationale and plan of care. Pt educated on care and removal of Kineseotape. Discussed dry needling tx with pt, she requests to trial at next session.    Written Home Exercises Provided: yes.  Exercises were reviewed and Arben was able to demonstrate them prior to the end of the session.  Arben demonstrated good  understanding of the education provided.     See EMR under Patient Instructions for exercises provided 2/26/2019.    Assessment   Arben is a 64 y.o. female referred to outpatient Physical Therapy with a medical diagnosis of M25.561 (ICD-10-CM) - Right knee pain, unspecified chronicity. Pt presents with onset of R knee pain with increased activity at the gym about 6 weeks ago. Weakness of B LE with MMT. Tightness and tenderness of R iliotibial band. Pt with trigger point tenderness to R knee and would benefit from trial  of dry needling.    Pt prognosis is Good.   Pt will benefit from skilled outpatient Physical Therapy to address the deficits stated above and in the chart below, provide pt/family education, and to maximize pt's level of independence.     Plan of care discussed with patient: Yes  Pt's spiritual, cultural and educational needs considered and patient is agreeable to the plan of care and goals as stated below:     Anticipated Barriers for therapy: none    Medical Necessity is demonstrated by the following  History  Co-morbidities and personal factors that may impact the plan of care Co-morbidities:   depression    Personal Factors:   no deficits     low   Examination  Body Structures and Functions, activity limitations and participation restrictions that may impact the plan of care Body Regions:   lower extremities    Body Systems:    strength    Participation Restrictions:   Pain with rising, pain with initial steps, pain with stairs to home    Activity limitations:   Learning and applying knowledge  no deficits    General Tasks and Commands  no deficits    Communication  no deficits    Mobility  walking    Self care  no deficits    Domestic Life  no deficits    Interactions/Relationships  no deficits    Life Areas  no deficits    Community and Social Life  recreation and leisure         low   Clinical Presentation stable and uncomplicated low   Decision Making/ Complexity Score: low     Goals:  Short Term Goals (4 weeks)  1. Pt will demonstrate improvements in B knee strength >/=4/5 for ease with stair climbing.  2. Pt will report <2/10 pain within the R knee for ease with ADL's.    Long Term Goals (8 weeks)  1. Pt will demonstrate >/=4+/5 strength within B LE for ease with house hold chores and climbing stairs  2. Pt will report being independent with her HEP for maintenance of improvements gained during therapy sessions  3. Pt will report <1/10 pain within the R knee for ease with ADLs  4. Pt will demonstrate  ambulation x 300 ft without AD or antalgic gait to improve functional mobility in community.       Plan   Plan of care Certification: 2/26/2019 to 5/24/2019.    Outpatient Physical Therapy 2 times weekly for 12 weeks to include the following interventions: Gait Training, Manual Therapy, Moist Heat/ Ice, Patient Education, Therapeutic Exercise and Dry Needling.     Kadie Yancey, PT

## 2019-02-26 NOTE — PATIENT INSTRUCTIONS
X-ray.  NSAID's prn.  RICE (rest ice compression elevation).  Hold off on PT until Ortho eval.  F/U with Ortho (appointment scheduled for this evening).  F/U with PCP.  RTC prn.

## 2019-02-26 NOTE — PROGRESS NOTES
Subjective:      Patient ID: Arben De Luna is a 64 y.o. female.    Chief Complaint: Pain of the Right Knee    HPI  64 year old female presents with chief complaint of right knee pain x 1 month. She denies trauma. Pain started while she was walking on a treadmill with some incline. Pain is worse with walking. She took aleve and mobic with no relief. She reports popping and maybe catching. She reports mild swelling.   Review of Systems   Constitution: Negative for chills, fever and night sweats.   Cardiovascular: Negative for chest pain.   Respiratory: Negative for cough and shortness of breath.    Hematologic/Lymphatic: Does not bruise/bleed easily.   Skin: Negative for color change.   Gastrointestinal: Negative for heartburn.   Genitourinary: Negative for dysuria.   Neurological: Negative for numbness and paresthesias.   Psychiatric/Behavioral: Negative for altered mental status.   Allergic/Immunologic: Negative for persistent infections.         Objective:            Ortho/SPM Exam  General :   alert, appears stated age and cooperative   Gait: Antalgic. The patient can bear weight on the injured extremity.   Right Lower Extremity  Hip Palpation:  no tenderness over the greater  trochanter   Hip ROM: 100% of normal    Knee Effusion:  None.   Ecchymosis:  none   Knee ROM:  0 to 120 degrees with subpatellar   crepitance.   Patella:  Patella does track normally.  Patellar apprehension test: negative  Patellar compression test: negative   Tenderness: No TTP   Stability:  Lachman's test: negative  Posterior drawer: negative  Medial collateral ligament: negative  Lateral collateral ligament: negative         Yunior's Test:  negative with no joint line tenderness   Sensation:   intact to light touch   Pulses: normal DP and PT pulses         X-ray: ordered and reviewed by myself. Mild valgus morphology.  No significant joint effusion.  No acute fracture or suspicious osseous lesion.  Mild narrowing of the medial  tibiofemoral joint spaces.  Mild marginal osteophytosis.  Remaining joint spaces are satisfactorily preserved.  Soft tissues appear normal.        Assessment:       Encounter Diagnoses   Name Primary?    Right knee pain, unspecified chronicity Yes    Primary osteoarthritis of right knee           Plan:       Discussed treatment options with patient. She will start PT tomorrow. She would like an injection. Discussed activity modification. RTC prn.     PROCEDURE:  I have explained the risks, benefits, and alternatives of the procedure in detail.  The patient voices understanding and all questions have been answered.  The patient agrees to proceed as planned. So after I performed a sterile prep of the skin in the normal fashion the right knee is injected using a 22 gauge needle from the anterolateral approach with a combination of 4cc 1% plain lidocaine and 40 mg of kenalog.  The patient is cautioned and immediate relief of pain is secondary to the local anesthetic and will be temporary.  After the anesthetic wears off there may be a increase in pain that may last for a few hours or a few days and they should use ice to help alleviate this flair up of pain.

## 2019-03-11 NOTE — PROGRESS NOTES
Physical Therapy Daily Treatment Note     Name: Arben Steinernes  Clinic Number: [id number]    Therapy Diagnosis:   Encounter Diagnoses   Name Primary?    Acute pain of right knee     Joint stiffness of knee, right      Physician: Jamel Li NP    Visit Date: 3/12/2019    Physician Orders: PT Eval and Treat   Medical Diagnosis from Referral: M25.561 (ICD-10-CM) - Right knee pain, unspecified chronicity   Evaluation Date: 2/26/2019  Authorization Period Expiration: 2/11/2020  Plan of Care Expiration: 5/24/2019  Visit # / Visits authorized: 2/20    Time In: 2:00 PM  Time Out: 3:00 PM  Total Billable Time: 60 minutes    Precautions: Standard    Subjective     Pt reports: having good days and bad days, but does feel she's getting a little stronger with HEP.  She was compliant with home exercise program.  Response to previous treatment: some relief with Kineseotape  Functional change: no change    Pain: 2/10  Location: right knee      Objective     Arben received therapeutic exercises to develop strength, endurance, ROM, flexibility and core stabilization for 30 minutes including:  SLR flex 1# 2 x 10  SLR abd 1# 2 x 10  SLR ext 1# 2 x 10  Bridges 2 x 10  Clam with OTB 2 x 10  Pratt with YTB 2 x 10      Arben received the following manual therapy techniques:    30 min x Application of TDN: Pt educated on benefits and potential side effects of dry needling. Educated pt on benefits, precautions, side effects following TDN. Educated pt to use heat following treatment sessions if pt is experiencing pain or soreness. Pt verbalized good understanding of education.  Pt signed written consent to dry needling. Pt gave verbal consent for DN    Pt received dry needling to the below listed muscles using 40 mm needles.  R knee 9 point OA protocol with E-stim applied through 3 channels at 2 Hz and mA to tolerance.           Home Exercises Provided and Patient Education Provided     Education provided:   - Pt educated regarding  risks and benefits of dry needling. Pt declined copy of consent form. Pt educated on use of heat as needed for soreness following dry needling    Written Home Exercises Provided: Patient instructed to cont prior HEP.  Exercises were reviewed and Arben was able to demonstrate them prior to the end of the session.  Arben demonstrated good  understanding of the education provided.     See EMR under Patient Instructions for exercises provided 2/26/2019.    Assessment     Good tolerance to initial treatment session today. Good carryover with performance of SLR series indicating compliance with HEP. 1# weight added to all straight leg raises with min c/o mm fatigue. Good tolerance to new therex. Dry needling initiated today with pt's written and verbal consent. Good soft tissue response to dry needling evident by increased grasp with unilateral winding at all insertion points. E-stim applied through 3 channels at 2 Hz and 4-5 mA to tolerance. No adverse effects following treatment.  Arben is progressing well towards her goals.   Pt prognosis is Good.     Pt will continue to benefit from skilled outpatient physical therapy to address the deficits listed in the problem list box on initial evaluation, provide pt/family education and to maximize pt's level of independence in the home and community environment.     Pt's spiritual, cultural and educational needs considered and pt agreeable to plan of care and goals.     Anticipated barriers to physical therapy: none    Goals: IE 2/26/2019  Short Term Goals (4 weeks)  1. Pt will demonstrate improvements in B knee strength >/=4/5 for ease with stair climbing. Progressing, not met  2. Pt will report <2/10 pain within the R knee for ease with ADL's. Progressing, not met     Long Term Goals (8 weeks)  1. Pt will demonstrate >/=4+/5 strength within B LE for ease with house hold chores and climbing stairs. Progressing, not met  2. Pt will report being independent with her HEP for  maintenance of improvements gained during therapy sessions. Progressing, not met  3. Pt will report <1/10 pain within the R knee for ease with ADLs. Progressing, not met  4. Pt will demonstrate ambulation x 300 ft without AD or antalgic gait to improve functional mobility in community. Progressing, not met      Plan     Continue plan of care with focus on improving B LE strengthening. Continue dry needling as needed to reduce pain.  Plan of care Certification: 2/26/2019 to 5/24/2019.     Outpatient Physical Therapy 2 times weekly for 12 weeks to include the following interventions: Gait Training, Manual Therapy, Moist Heat/ Ice, Patient Education, Therapeutic Exercise and Dry Needling.      Kadie Yancey, PT

## 2019-03-12 ENCOUNTER — CLINICAL SUPPORT (OUTPATIENT)
Dept: REHABILITATION | Facility: OTHER | Age: 64
End: 2019-03-12
Payer: MEDICARE

## 2019-03-12 DIAGNOSIS — M25.561 ACUTE PAIN OF RIGHT KNEE: ICD-10-CM

## 2019-03-12 DIAGNOSIS — M25.661 JOINT STIFFNESS OF KNEE, RIGHT: ICD-10-CM

## 2019-03-12 PROCEDURE — 97140 MANUAL THERAPY 1/> REGIONS: CPT | Mod: PN | Performed by: PHYSICAL THERAPIST

## 2019-03-12 PROCEDURE — 97110 THERAPEUTIC EXERCISES: CPT | Mod: PN | Performed by: PHYSICAL THERAPIST

## 2019-03-20 NOTE — PROGRESS NOTES
Physical Therapy Daily Treatment Note     [id number]    Therapy Diagnosis:   Encounter Diagnoses   Name Primary?    Acute pain of right knee     Joint stiffness of knee, right      Physician: Jamel Li NP    Visit Date: 3/21/2019    Physician Orders: PT Eval and Treat   Medical Diagnosis from Referral: M25.561 (ICD-10-CM) - Right knee pain, unspecified chronicity   Evaluation Date: 2/26/2019  Authorization Period Expiration: 2/11/2020  Plan of Care Expiration: 5/24/2019  Visit # / Visits authorized: 3/20    Time In: 3:00 PM  Time Out: 4:00 PM  Total Billable Time: 60 minutes (1:1 with PT 30 min)    Precautions: Standard    Subjective     Pt reports: No new complaints today. She says she felt good after initial treatment with dry needling. Reports some pain recently to posterior knee.  She was compliant with home exercise program.  Response to previous treatment: some relief with dry needling  Functional change: no change    Pain: 2/10  Location: right knee      Objective     Arben received therapeutic exercises to develop strength, endurance, ROM, flexibility and core stabilization for 30 minutes including:  SLR flex 1# 2 x 10  SLR abd 1# 2 x 10  SLR ext 1# 2 x 10  Bridges 3 x 10  Clam with OTB 3 x 10  [place] with YTB 3 x 10      Arben received the following manual therapy techniques:    25 min x Application of TDN: Pt educated on benefits and potential side effects of dry needling. Educated pt on benefits, precautions, side effects following TDN. Educated pt to use heat following treatment sessions if pt is experiencing pain or soreness. Pt verbalized good understanding of education.  Pt signed written consent to dry needling. Pt gave verbal consent for DN    Pt received dry needling to the below listed muscles using 40 mm needles.  R knee 9 point OA protocol with E-stim applied through 3 channels at 2 Hz and mA to tolerance.     5 min x preparation and application of  Kineseotape. I strip in asterisk pattern to posterior R knee. Patient received education regarding appropriate care and removal of Kinesiotape. Patient instructed in proper removal techniques if skin irritation occurs.        Home Exercises Provided and Patient Education Provided     Education provided:   - Pt educated regarding risks and benefits of dry needling. Pt declined copy of consent form. Pt educated on use of heat as needed for soreness following dry needling    Written Home Exercises Provided: Patient instructed to cont prior HEP.  Exercises were reviewed and Arben was able to demonstrate them prior to the end of the session.  Arben demonstrated good  understanding of the education provided.     See EMR under Patient Instructions for exercises provided 2/26/2019.    Assessment     Good tolerance to all therex today. Increased to 3 sets with therex, min c/o mm fatigue but able to complete without significant difficulty. Good soft tissue response to dry needling evident by increased grasp with unilateral winding at all insertion points. E-stim applied through 3 channels at 2 Hz and 4-5 mA to tolerance. No adverse effects following treatment.  Arben is progressing well towards her goals.   Pt prognosis is Good.     Pt will continue to benefit from skilled outpatient physical therapy to address the deficits listed in the problem list box on initial evaluation, provide pt/family education and to maximize pt's level of independence in the home and community environment.     Pt's spiritual, cultural and educational needs considered and pt agreeable to plan of care and goals.     Anticipated barriers to physical therapy: none    Goals: IE 2/26/2019  Short Term Goals (4 weeks)  1. Pt will demonstrate improvements in B knee strength >/=4/5 for ease with stair climbing. Progressing, not met  2. Pt will report <2/10 pain within the R knee for ease with ADL's. Progressing, not met     Long Term Goals (8 weeks)  1. Pt  will demonstrate >/=4+/5 strength within B LE for ease with house hold chores and climbing stairs. Progressing, not met  2. Pt will report being independent with her HEP for maintenance of improvements gained during therapy sessions. Progressing, not met  3. Pt will report <1/10 pain within the R knee for ease with ADLs. Progressing, not met  4. Pt will demonstrate ambulation x 300 ft without AD or antalgic gait to improve functional mobility in community. Progressing, not met      Plan     Continue plan of care with focus on improving B LE strengthening. Continue dry needling as needed to reduce pain.  Plan of care Certification: 2/26/2019 to 5/24/2019.     Outpatient Physical Therapy 2 times weekly for 12 weeks to include the following interventions: Gait Training, Manual Therapy, Moist Heat/ Ice, Patient Education, Therapeutic Exercise and Dry Needling.      Kadie Yancey, PT

## 2019-03-21 ENCOUNTER — CLINICAL SUPPORT (OUTPATIENT)
Dept: REHABILITATION | Facility: OTHER | Age: 64
End: 2019-03-21
Payer: MEDICARE

## 2019-03-21 DIAGNOSIS — M25.661 JOINT STIFFNESS OF KNEE, RIGHT: ICD-10-CM

## 2019-03-21 DIAGNOSIS — M25.561 ACUTE PAIN OF RIGHT KNEE: ICD-10-CM

## 2019-03-21 PROCEDURE — 97110 THERAPEUTIC EXERCISES: CPT | Mod: PN | Performed by: PHYSICAL THERAPIST

## 2019-03-21 PROCEDURE — 97140 MANUAL THERAPY 1/> REGIONS: CPT | Mod: PN | Performed by: PHYSICAL THERAPIST

## 2019-03-25 ENCOUNTER — CLINICAL SUPPORT (OUTPATIENT)
Dept: OTOLARYNGOLOGY | Facility: CLINIC | Age: 64
End: 2019-03-25
Payer: MEDICARE

## 2019-03-25 ENCOUNTER — OFFICE VISIT (OUTPATIENT)
Dept: OTOLARYNGOLOGY | Facility: CLINIC | Age: 64
End: 2019-03-25
Payer: MEDICARE

## 2019-03-25 VITALS
TEMPERATURE: 98 F | DIASTOLIC BLOOD PRESSURE: 81 MMHG | SYSTOLIC BLOOD PRESSURE: 131 MMHG | BODY MASS INDEX: 25.39 KG/M2 | HEART RATE: 68 BPM | HEIGHT: 70 IN | WEIGHT: 177.31 LBS

## 2019-03-25 DIAGNOSIS — R42 DIZZINESS: ICD-10-CM

## 2019-03-25 DIAGNOSIS — J34.2 NASAL SEPTAL DEVIATION: ICD-10-CM

## 2019-03-25 DIAGNOSIS — H93.19 TINNITUS AURIUM, UNSPECIFIED LATERALITY: ICD-10-CM

## 2019-03-25 DIAGNOSIS — H90.3 ASYMMETRICAL SENSORINEURAL HEARING LOSS: Primary | ICD-10-CM

## 2019-03-25 DIAGNOSIS — J30.9 ALLERGIC RHINITIS, UNSPECIFIED SEASONALITY, UNSPECIFIED TRIGGER: ICD-10-CM

## 2019-03-25 DIAGNOSIS — H93.13 TINNITUS OF BOTH EARS: ICD-10-CM

## 2019-03-25 DIAGNOSIS — H90.3 SENSORINEURAL HEARING LOSS (SNHL) OF BOTH EARS: Primary | ICD-10-CM

## 2019-03-25 DIAGNOSIS — Z82.2 FAMILY HISTORY OF HEARING LOSS: ICD-10-CM

## 2019-03-25 DIAGNOSIS — H93.8X9 EAR POPPING, UNSPECIFIED LATERALITY: ICD-10-CM

## 2019-03-25 PROCEDURE — 92550 PR TYMPANOMETRY AND REFLEX THRESHOLD MEASUREMENTS: ICD-10-PCS | Mod: S$GLB,,, | Performed by: AUDIOLOGIST-HEARING AID FITTER

## 2019-03-25 PROCEDURE — 99204 OFFICE O/P NEW MOD 45 MIN: CPT | Mod: S$GLB,,, | Performed by: SPECIALIST

## 2019-03-25 PROCEDURE — 92557 COMPREHENSIVE HEARING TEST: CPT | Mod: S$GLB,,, | Performed by: AUDIOLOGIST-HEARING AID FITTER

## 2019-03-25 PROCEDURE — 92550 TYMPANOMETRY & REFLEX THRESH: CPT | Mod: S$GLB,,, | Performed by: AUDIOLOGIST-HEARING AID FITTER

## 2019-03-25 PROCEDURE — 92557 PR COMPREHENSIVE HEARING TEST: ICD-10-PCS | Mod: S$GLB,,, | Performed by: AUDIOLOGIST-HEARING AID FITTER

## 2019-03-25 PROCEDURE — 99204 PR OFFICE/OUTPT VISIT, NEW, LEVL IV, 45-59 MIN: ICD-10-PCS | Mod: S$GLB,,, | Performed by: SPECIALIST

## 2019-03-25 NOTE — PATIENT INSTRUCTIONS
Assistive Listening Devices (ALDs)    ALDs can help you hear better. They are used alone or with a hearing aid. ALDs amplify sounds that you may hear in your daily life. Read below to learn about the different kinds of ALDs.  Alerting devices  If you have trouble hearing sounds in your home, alerting devices can be installed. These have flashing lights, loud bells, or vibrators. They are activated by sounds around the home, such as the ringing of the phone or doorbell. They can be used to signal that there is a smoke alarm or crying baby. They can be used with an alarm clock.  Television listening devices  A television listening device lets you amplify the sound from a TV. It can do this without disturbing people around you.  Personal communicators  A personal communicator helps you hear someone talk to you in a noisy place. It lets the speakers voice be amplified above the background noise. It can be used in places such as a restaurant or a car.  Telephone amplifiers  A telephone amplifier boosts the volume on a phone. A portable amplifier can be put over most telephone receivers. You may be able to get this kind of device for a lower price. This is due to the Americans with Disabilities Act. To get this discount, you may need to fill out a form. Or you may need to get a note from your healthcare provider.  Group listening devices  These devices allow better sound to individuals in a group setting. Some theaters and concert halls have these devices. Some meeting rooms also have them. You can ask a  or ticket-taker for more information.  Date Last Reviewed: 10/1/2016  © 2582-2933 [employer]. [address] [place] [address], [place] [address]. All rights reserved. This information is not intended as a substitute for professional medical care. Always follow your healthcare professional's instructions.        How Hearing Aids Can Help You    If youre losing your hearing, it can be frustrating:  But, hearing aids can help you hear what youve been missing. Not everyone who has hearing loss needs hearing aids. But if your hearing loss is keeping you from communicating with others, hearing aids will most likely help you.  What hearing aids do  After getting used to your new hearing aids, you may find that:  · You hear and understand speech better in many cases.  · Youre able to join in when talking with a group of people.  · You hear certain speech sounds more clearly.  · You can hear warning signs that help you stay safe, such as a smoke alarm or car horn.  · Life is more enjoyable for you and the people around you.  How hearing aids help you hear  Hearing aids help by making most sounds clearer for the brain. Sounds you cant hear as well are made louder. Hearing aids also filter sound to reduce some background noise. And they soften some sounds that may be too loud. As a result, signals traveling to the brain are easier to understand.  The microphone picks up sound and carries it into the hearing aid. The amplifier makes the sound louder and clearer. The  sends this stronger sound into the ear canal. The stronger sound travels the rest of the way into the ear to the brain.    Setting realistic expectations  Advances in technology have made todays hearing aids better than ever. But your hearing still wont be perfect. You may not hear all sounds. And you wont hear only the things you want to. In noisy places you may still have trouble hearing speech clearly. Even so, you can learn techniques for better listening. Along with hearing aids, these techniques will help you understand whats happening around you much better.   Date Last Reviewed: 9/1/2016 © 2000-2017 [employer]. [address] [place] [address], [place] [address]. All rights reserved. This information is not intended as a substitute for professional medical care. Always follow your healthcare professional's  instructions.        How Hearing Aids Can Help You    If youre losing your hearing, it can be frustrating: But, hearing aids can help you hear what youve been missing. Not everyone who has hearing loss needs hearing aids. But if your hearing loss is keeping you from communicating with others, hearing aids will most likely help you.  What hearing aids do  After getting used to your new hearing aids, you may find that:  · You hear and understand speech better in many cases.  · Youre able to join in when talking with a group of people.  · You hear certain speech sounds more clearly.  · You can hear warning signs that help you stay safe, such as a smoke alarm or car horn.  · Life is more enjoyable for you and the people around you.  How hearing aids help you hear  Hearing aids help by making most sounds clearer for the brain. Sounds you cant hear as well are made louder. Hearing aids also filter sound to reduce some background noise. And they soften some sounds that may be too loud. As a result, signals traveling to the brain are easier to understand.  The microphone picks up sound and carries it into the hearing aid. The amplifier makes the sound louder and clearer. The  sends this stronger sound into the ear canal. The stronger sound travels the rest of the way into the ear to the brain.    Setting realistic expectations  Advances in technology have made todays hearing aids better than ever. But your hearing still wont be perfect. You may not hear all sounds. And you wont hear only the things you want to. In noisy places you may still have trouble hearing speech clearly. Even so, you can learn techniques for better listening. Along with hearing aids, these techniques will help you understand whats happening around you much better.   Date Last Reviewed: 9/1/2016  © 6109-4147 [employer]. [address] [place] [address], [place] [address]. All rights reserved. This information is not intended as a  substitute for professional medical care. Always follow your healthcare professional's instructions.

## 2019-03-25 NOTE — LETTER
March 25, 2019      Joanie To MD  [address]  [facility] [address]           [facility] [address]  [address]  [facility] [address]  Phone: [phone]  Fax: [phone]          Patient: Arben De Luna   MR Number: [id number]   YOB: 1955   Date of Visit: 3/25/2019       Dear Dr. Joanie To:    Thank you for referring Arben De Luna to me for evaluation. Attached you will find relevant portions of my assessment and plan of care.    If you have questions, please do not hesitate to call me. I look forward to following Arben De Luna along with you.    Sincerely,    WILI Tirado MD    Enclosure  CC:  No Recipients    If you would like to receive this communication electronically, please contact [email or web address] or [phone] to request more information on [employer] access.    For providers and/or their staff who would like to refer a patient to [facility], please contact us through our one-stop-shop provider referral line, [facility], at [phone].    If you feel you have received this communication in error or would no longer like to receive these types of communications, please e-mail [email or web address]

## 2019-03-25 NOTE — PROGRESS NOTES
Subjective:       Patient ID: Arben De Luna is a 64 y.o. female.    Chief Complaint: Sinus Problem (with Dizziness and hearing loss)    The patient moved to the [facility] from [place] several months ago.  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  She is having a hard time hearing and understanding the children.  At home she turns TV very loud and has difficulty understanding it.  She has a mother and sister who both were hearing impaired and required statements to be repeated frequently.  She has a brother who actually wears hearing aids.  She has had 2 episodes of brief vertigo when she 1st awaken from bed.  She has been using Sudafed and Flonase control corby will symptoms.  She feels that the hearing in her right ear is worse than the left.  She does have bilateral tinnitus intermittently.    Review of Systems   Constitutional: Positive for fatigue. Negative for activity change, appetite change, chills, fever and unexpected weight change.   HENT: Positive for congestion, ear pain, hearing loss, postnasal drip, rhinorrhea, sore throat and tinnitus. Negative for ear discharge, facial swelling, mouth sores, sinus pressure, sinus pain, sneezing, trouble swallowing and voice change.    Eyes: Negative for photophobia, pain, discharge, redness, itching and visual disturbance.   Respiratory: Positive for cough. Negative for apnea, choking, shortness of breath and wheezing.    Cardiovascular: Negative for chest pain and palpitations.   Gastrointestinal: Negative for abdominal distention, abdominal pain, nausea and vomiting.   Musculoskeletal: Negative for arthralgias, myalgias, neck pain and neck stiffness.   Skin: Negative.  Negative for color change, pallor and rash.   Allergic/Immunologic: Positive for environmental allergies. Negative for food allergies and immunocompromised state.   Neurological: Positive for dizziness, light-headedness and headaches. Negative for facial asymmetry, speech difficulty, weakness and numbness.    Hematological: Negative for adenopathy. Does not bruise/bleed easily.   Psychiatric/Behavioral: Negative for confusion, decreased concentration and sleep disturbance.       Objective:      Physical Exam   Constitutional: She is oriented to person, place, and time. She appears well-developed and well-nourished. She is cooperative.   HENT:   Head: Normocephalic.   Right Ear: External ear and ear canal normal. Tympanic membrane is retracted.   Left Ear: External ear and ear canal normal. Tympanic membrane is retracted.   Nose: Mucosal edema (cyanotic, boggy inferior turbinates bilaterally), rhinorrhea (clear mucus bilaterally) and septal deviation (To the right) present.   Mouth/Throat: Uvula is midline, oropharynx is clear and moist and mucous membranes are normal. No oral lesions.   Eyes: Pupils are equal, round, and reactive to light. EOM and lids are normal. Right eye exhibits no discharge and no exudate. Left eye exhibits no discharge and no exudate. Right conjunctiva is injected. Left conjunctiva is injected.   Neck: Trachea normal and normal range of motion. No muscular tenderness present. No tracheal deviation present. No thyroid mass and no thyromegaly present.   Cardiovascular: Normal rate, regular rhythm, normal heart sounds and normal pulses.   Pulmonary/Chest: Effort normal and breath sounds normal. No stridor. She has no decreased breath sounds. She has no wheezes. She has no rhonchi. She has no rales.   Abdominal: Soft. Bowel sounds are normal. There is no tenderness.   Musculoskeletal: Normal range of motion.   Lymphadenopathy:        Head (right side): No submental, no submandibular, no preauricular, no posterior auricular and no occipital adenopathy present.        Head (left side): No submental, no submandibular, no preauricular, no posterior auricular and no occipital adenopathy present.     She has no cervical adenopathy.   Neurological: She is alert and oriented to person, place, and time. She  has normal strength. No cranial nerve deficit or sensory deficit. Gait normal.   Skin: Skin is warm and dry. No petechiae and no rash noted. No cyanosis. Nails show no clubbing.   Psychiatric: She has a normal mood and affect. Her speech is normal and behavior is normal. Judgment and thought content normal. Cognition and memory are normal.             Assessment:       1. Sensorineural hearing loss (SNHL) of both ears    2. Tinnitus of both ears    3. Allergic rhinitis, unspecified seasonality, unspecified trigger    4. Nasal septal deviation        Plan:       The patient would be a good candidate for bilateral amplification.  She is medically clear for   hearing aids in both ears.  I have asked her to return when she is having an episode of acute vertigo.  This well could be benign paroxysmal positional vertigo.  If she has no for the episodes of balance disturbance I will recheck her in 1 year.  She needs to undergo annual audiologic evaluations.  She also needs to avoid loud noise exposure.

## 2019-03-26 ENCOUNTER — CLINICAL SUPPORT (OUTPATIENT)
Dept: REHABILITATION | Facility: OTHER | Age: 64
End: 2019-03-26
Payer: MEDICARE

## 2019-03-26 DIAGNOSIS — M25.661 JOINT STIFFNESS OF KNEE, RIGHT: ICD-10-CM

## 2019-03-26 DIAGNOSIS — M25.561 ACUTE PAIN OF RIGHT KNEE: ICD-10-CM

## 2019-03-26 PROCEDURE — 97140 MANUAL THERAPY 1/> REGIONS: CPT | Mod: PN | Performed by: PHYSICAL THERAPIST

## 2019-03-26 PROCEDURE — 97110 THERAPEUTIC EXERCISES: CPT | Mod: PN | Performed by: PHYSICAL THERAPIST

## 2019-03-26 NOTE — PROGRESS NOTES
Physical Therapy Daily Treatment Note     [id number]    Therapy Diagnosis:   Encounter Diagnoses   Name Primary?    Acute pain of right knee     Joint stiffness of knee, right      Physician: Jamel Li NP    Visit Date: 3/26/2019    Physician Orders: PT Eval and Treat   Medical Diagnosis from Referral: M25.561 (ICD-10-CM) - Right knee pain, unspecified chronicity   Evaluation Date: 2/26/2019  Authorization Period Expiration: 2/11/2020  Plan of Care Expiration: 5/24/2019  Visit # / Visits authorized: 4/20    Time In: 2:00 PM  Time Out: 3:00 PM  Total Billable Time: 60 minutes (1:1 with PT 60 min)    Precautions: Standard    Subjective     Pt reports:she has been having a little more pain the past few days. Sometime pain to posterior knee, sometimes anteromedial. No change with tape to posterior knee last visit, but also didn't stay on very long.  She was compliant with home exercise program.  Response to previous treatment: some relief with dry needling  Functional change: no change    Pain: 2/10  Location: right knee      Objective     Arben received therapeutic exercises to develop strength, endurance, ROM, flexibility and core stabilization for 30 minutes including:  SLR flex 1# 3 x 10  SLR abd 1# 3 x 10  SLR ext 1# 3 x 10  Bridges 2 x 15  Clam with GTB 3 x 10  [place] with YTB 3 x 10      Arben received the following manual therapy techniques:    25 min x Application of TDN: Pt educated on benefits and potential side effects of dry needling. Educated pt on benefits, precautions, side effects following TDN. Educated pt to use heat following treatment sessions if pt is experiencing pain or soreness. Pt verbalized good understanding of education.  Pt signed written consent to dry needling. Pt gave verbal consent for DN    Pt received dry needling to the below listed muscles using 40 mm needles.  R knee 9 point OA protocol with E-stim applied through 3 channels at 2 Hz  and mA to tolerance.     5 min x preparation and application of Kineseotape. Ystrip for patellar tracking, I strip for decompression and pain relief. Patient received education regarding appropriate care and removal of Kinesiotape. Patient instructed in proper removal techniques if skin irritation occurs.        Home Exercises Provided and Patient Education Provided     Education provided:   - Pt educated regarding risks and benefits of dry needling. Pt declined copy of consent form. Pt educated on use of heat as needed for soreness following dry needling    Written Home Exercises Provided: Patient instructed to cont prior HEP.  Exercises were reviewed and Arben was able to demonstrate them prior to the end of the session.  Arben demonstrated good  understanding of the education provided.     See EMR under Patient Instructions for exercises provided 2/26/2019.    Assessment     Good tolerance to therex today. Min c/o fatigue progressing through SLR series, but able to complete. Good soft tissue response to dry needling evident by increased grasp with unilateral winding at all insertion points. E-stim applied through 3 channels at 2 Hz and 4-5 mA to tolerance. No adverse effects following treatment. Kineseotape applied to anterior knee today with pt reporting some relief.  Arben is progressing well towards her goals.   Pt prognosis is Good.     Pt will continue to benefit from skilled outpatient physical therapy to address the deficits listed in the problem list box on initial evaluation, provide pt/family education and to maximize pt's level of independence in the home and community environment.     Pt's spiritual, cultural and educational needs considered and pt agreeable to plan of care and goals.     Anticipated barriers to physical therapy: none    Goals: IE 2/26/2019  Short Term Goals (4 weeks)  1. Pt will demonstrate improvements in B knee strength >/=4/5 for ease with stair climbing. Progressing, not met  2. Pt  will report <2/10 pain within the R knee for ease with ADL's. Progressing, not met     Long Term Goals (8 weeks)  1. Pt will demonstrate >/=4+/5 strength within B LE for ease with house hold chores and climbing stairs. Progressing, not met  2. Pt will report being independent with her HEP for maintenance of improvements gained during therapy sessions. Progressing, not met  3. Pt will report <1/10 pain within the R knee for ease with ADLs. Progressing, not met  4. Pt will demonstrate ambulation x 300 ft without AD or antalgic gait to improve functional mobility in community. Progressing, not met      Plan     Continue plan of care with focus on improving B LE strengthening. Continue dry needling as needed to reduce pain.  Plan of care Certification: 2/26/2019 to 5/24/2019.     Outpatient Physical Therapy 2 times weekly for 12 weeks to include the following interventions: Gait Training, Manual Therapy, Moist Heat/ Ice, Patient Education, Therapeutic Exercise and Dry Needling.      Kadie Yancey, PT

## 2019-04-01 ENCOUNTER — TELEPHONE (OUTPATIENT)
Dept: INTERNAL MEDICINE | Facility: CLINIC | Age: 64
End: 2019-04-01

## 2019-04-01 RX ORDER — LEVOCETIRIZINE DIHYDROCHLORIDE 5 MG/1
5 TABLET, FILM COATED ORAL NIGHTLY
Qty: 30 TABLET | Refills: 11 | Status: SHIPPED | OUTPATIENT
Start: 2019-04-01 | End: 2019-07-09 | Stop reason: SDUPTHER

## 2019-04-01 NOTE — TELEPHONE ENCOUNTER
----- Message from Emma Mckeon sent at 4/1/2019  9:50 AM CDT -----  Contact: SIDDHARTH LAZO [[id number]]  Name of Who is Calling: SIDDHARTH LAZO [[id number]]      What is the request in detail:  Patient called requesting a stronger antihistamine called into her local pharmacy.  Please give a call back at your earliest convenience.     THANKS!      Can the clinic reply by [facility]: no      What Number to Call Back: SIDDHARTH LAZO // ph# [phone]

## 2019-04-01 NOTE — TELEPHONE ENCOUNTER
Called pt and LVM stating :xyzal sent in - receiving messages that these are coverage exclusions - these are otc which is why they may not be covered. rec check pharmacy first to see it was filled   - if not then can try zyrtec 10mg once daily otc     Left office number for pt to return office call

## 2019-04-01 NOTE — TELEPHONE ENCOUNTER
xyzal sent in - receiving messages that these are coverage exclusions - these are otc which is why they may not be covered. rec check pharmacy first to see it was filled   - if not then can try zyrtec 10mg once daily otc

## 2019-04-03 NOTE — PROGRESS NOTES
"  Physical Therapy Daily Treatment Note     Name: Arben Steinernes  Clinic Number: [id number]    Therapy Diagnosis:   Encounter Diagnoses   Name Primary?    Acute pain of right knee     Joint stiffness of knee, right      Physician: Jamel Li NP    Visit Date: 4/4/2019    Physician Orders: PT Eval and Treat   Medical Diagnosis from Referral: M25.561 (ICD-10-CM) - Right knee pain, unspecified chronicity   Evaluation Date: 2/26/2019  Authorization Period Expiration: 2/11/2020  Plan of Care Expiration: 5/24/2019  Visit # / Visits authorized: 5/20    Time In: 3:00 PM  Time Out: 4:00 PM  Total Billable Time: 60 minutes (1:1 with PT 60 min)    Precautions: Standard    Subjective     Pt reports: no anterior knee pain at all today or for the past several days. Pt reports feeling of tightness to posterior knee today, with significant relief following treatment today.  She was compliant with home exercise program.  Response to previous treatment: some relief with dry needling  Functional change: no change    Pain: 2/10  Location: right knee      Objective     Arben received therapeutic exercises to develop strength, endurance, ROM, flexibility and core stabilization for 50 minutes including:  SLR flex 2# 2 x 10  SLR abd 2# 2 x 10  SLR ext 2# 2 x 10  Bridges 2 x 15  Clam with GTB 2 x 15  [place] with YTB 2 x 15  +Seated HS stretch 3 x 30"  +Eccentric heel raises 2 x 10  +GS SB 3 x 30"      Arben received the following manual therapy techniques:    00 min x Application of TDN: Pt educated on benefits and potential side effects of dry needling. Educated pt on benefits, precautions, side effects following TDN. Educated pt to use heat following treatment sessions if pt is experiencing pain or soreness. Pt verbalized good understanding of education.  Pt signed written consent to dry needling. Pt gave verbal consent for DN    Pt received dry needling to the below listed muscles using 40 mm needles.  R knee 9 point OA protocol " with E-stim applied through 3 channels at 2 Hz and mA to tolerance.     00 min x preparation and application of Kineseotape. Ystrip for patellar tracking, I strip for decompression and pain relief. Patient received education regarding appropriate care and removal of Kinesiotape. Patient instructed in proper removal techniques if skin irritation occurs.      10 min x IASTM to distal HS and proximal gastroc      Home Exercises Provided and Patient Education Provided     Education provided:   - Pt educated regarding risks and benefits of dry needling. Pt declined copy of consent form. Pt educated on use of heat as needed for soreness following dry needling    Written Home Exercises Provided: Patient instructed to cont prior HEP.  Exercises were reviewed and Arben was able to demonstrate them prior to the end of the session.  Arben demonstrated good  understanding of the education provided.     See EMR under Patient Instructions for exercises provided 2/26/2019.    Assessment     Good tolerance to treatment today. Dry needling held today as pt reports no pain to anterior knee. With IASTM to posterior knee, fibrosis noted to medial and lateral gastroc and medial distal hamstring.   Arben is progressing well towards her goals.   Pt prognosis is Good.     Pt will continue to benefit from skilled outpatient physical therapy to address the deficits listed in the problem list box on initial evaluation, provide pt/family education and to maximize pt's level of independence in the home and community environment.     Pt's spiritual, cultural and educational needs considered and pt agreeable to plan of care and goals.     Anticipated barriers to physical therapy: none    Goals: IE 2/26/2019  Short Term Goals (4 weeks)  1. Pt will demonstrate improvements in B knee strength >/=4/5 for ease with stair climbing. Progressing, not met  2. Pt will report <2/10 pain within the R knee for ease with ADL's. Progressing, not met     Long Term  Goals (8 weeks)  1. Pt will demonstrate >/=4+/5 strength within B LE for ease with house hold chores and climbing stairs. Progressing, not met  2. Pt will report being independent with her HEP for maintenance of improvements gained during therapy sessions. Progressing, not met  3. Pt will report <1/10 pain within the R knee for ease with ADLs. Progressing, not met  4. Pt will demonstrate ambulation x 300 ft without AD or antalgic gait to improve functional mobility in community. Progressing, not met      Plan     Continue plan of care with focus on improving B LE strengthening. Continue dry needling as needed to reduce pain.  Plan of care Certification: 2/26/2019 to 5/24/2019.     Outpatient Physical Therapy 2 times weekly for 12 weeks to include the following interventions: Gait Training, Manual Therapy, Moist Heat/ Ice, Patient Education, Therapeutic Exercise and Dry Needling.      Kadie Yancey, PT

## 2019-04-04 ENCOUNTER — CLINICAL SUPPORT (OUTPATIENT)
Dept: REHABILITATION | Facility: OTHER | Age: 64
End: 2019-04-04
Payer: MEDICARE

## 2019-04-04 DIAGNOSIS — M25.561 ACUTE PAIN OF RIGHT KNEE: ICD-10-CM

## 2019-04-04 DIAGNOSIS — M25.661 JOINT STIFFNESS OF KNEE, RIGHT: ICD-10-CM

## 2019-04-04 PROCEDURE — 97110 THERAPEUTIC EXERCISES: CPT | Mod: PN | Performed by: PHYSICAL THERAPIST

## 2019-04-04 PROCEDURE — 97140 MANUAL THERAPY 1/> REGIONS: CPT | Mod: PN | Performed by: PHYSICAL THERAPIST

## 2019-04-10 ENCOUNTER — CLINICAL SUPPORT (OUTPATIENT)
Dept: REHABILITATION | Facility: OTHER | Age: 64
End: 2019-04-10
Payer: MEDICARE

## 2019-04-10 DIAGNOSIS — M25.661 JOINT STIFFNESS OF KNEE, RIGHT: ICD-10-CM

## 2019-04-10 DIAGNOSIS — M25.561 ACUTE PAIN OF RIGHT KNEE: ICD-10-CM

## 2019-04-10 PROCEDURE — 97110 THERAPEUTIC EXERCISES: CPT | Mod: PN | Performed by: PHYSICAL THERAPIST

## 2019-04-10 NOTE — PROGRESS NOTES
"  Physical Therapy Daily Treatment Note     [id number]    Therapy Diagnosis:   Encounter Diagnoses   Name Primary?    Acute pain of right knee     Joint stiffness of knee, right      Physician: Jamel Li NP    Visit Date: 4/10/2019    Physician Orders: PT Eval and Treat   Medical Diagnosis from Referral: M25.561 (ICD-10-CM) - Right knee pain, unspecified chronicity   Evaluation Date: 2/26/2019  Authorization Period Expiration: 2/11/2020  Plan of Care Expiration: 5/24/2019  Visit # / Visits authorized: 6/20    Time In: 2:00 PM  Time Out: 3:00 PM  Total Billable Time: 60 minutes (1:1 with PT 30 min)    Precautions: Standard    Subjective     Pt reports: feeling much better. Posterior tightness much improved, just a little tightness to the posterior/lateral R knee..  She was compliant with home exercise program.  Response to previous treatment: some relief with dry needling  Functional change: no change    Pain: 1/10  Location: right knee      Objective     Arben received therapeutic exercises to develop strength, endurance, ROM, flexibility and core stabilization for 50 minutes including:  SLR flex 2# 2 x 10  SLR abd 2# 2 x 10  SLR ext 2# 2 x 10  Bridges 2 x 15  Clam with GTB 2 x 15  [place] with YTB 2 x 15  Seated HS stretch 3 x 30"  Eccentric heel raises 2 x 15  GS SB 3 x 30"      Arben received the following manual therapy techniques:    00 min x Application of TDN: Pt educated on benefits and potential side effects of dry needling. Educated pt on benefits, precautions, side effects following TDN. Educated pt to use heat following treatment sessions if pt is experiencing pain or soreness. Pt verbalized good understanding of education.  Pt signed written consent to dry needling. Pt gave verbal consent for DN    Pt received dry needling to the below listed muscles using 40 mm needles.  R knee 9 point OA protocol with E-stim applied through 3 channels at 2 Hz and mA to " tolerance.     00 min x preparation and application of Kineseotape. Ystrip for patellar tracking, I strip for decompression and pain relief. Patient received education regarding appropriate care and removal of Kinesiotape. Patient instructed in proper removal techniques if skin irritation occurs.      10 min x IASTM to distal HS and proximal gastroc      Home Exercises Provided and Patient Education Provided     Education provided:   - Pt educated regarding risks and benefits of dry needling. Pt declined copy of consent form. Pt educated on use of heat as needed for soreness following dry needling    Written Home Exercises Provided: Patient instructed to cont prior HEP.  Exercises were reviewed and Arben was able to demonstrate them prior to the end of the session.  Arben demonstrated good  understanding of the education provided.     See EMR under Patient Instructions for exercises provided 2/26/2019.    Assessment     Good tolerance to all therex today. Continues with fibrosis noted with IASTM, particularly to medial and lateral distal HS.   Arben is progressing well towards her goals.   Pt prognosis is Good.     Pt will continue to benefit from skilled outpatient physical therapy to address the deficits listed in the problem list box on initial evaluation, provide pt/family education and to maximize pt's level of independence in the home and community environment.     Pt's spiritual, cultural and educational needs considered and pt agreeable to plan of care and goals.     Anticipated barriers to physical therapy: none    Goals: IE 2/26/2019  Short Term Goals (4 weeks)  1. Pt will demonstrate improvements in B knee strength >/=4/5 for ease with stair climbing. Progressing, not met  2. Pt will report <2/10 pain within the R knee for ease with ADL's. Progressing, not met     Long Term Goals (8 weeks)  1. Pt will demonstrate >/=4+/5 strength within B LE for ease with house hold chores and climbing stairs. Progressing,  not met  2. Pt will report being independent with her HEP for maintenance of improvements gained during therapy sessions. Progressing, not met  3. Pt will report <1/10 pain within the R knee for ease with ADLs. Progressing, not met  4. Pt will demonstrate ambulation x 300 ft without AD or antalgic gait to improve functional mobility in community. Progressing, not met      Plan     Continue plan of care with focus on improving B LE strengthening. Continue dry needling as needed to reduce pain.  Plan of care Certification: 2/26/2019 to 5/24/2019.     Outpatient Physical Therapy 2 times weekly for 12 weeks to include the following interventions: Gait Training, Manual Therapy, Moist Heat/ Ice, Patient Education, Therapeutic Exercise and Dry Needling.      Kadie Yancey, PT

## 2019-04-15 ENCOUNTER — CLINICAL SUPPORT (OUTPATIENT)
Dept: REHABILITATION | Facility: OTHER | Age: 64
End: 2019-04-15
Payer: MEDICARE

## 2019-04-15 DIAGNOSIS — M25.661 JOINT STIFFNESS OF KNEE, RIGHT: ICD-10-CM

## 2019-04-15 DIAGNOSIS — M25.561 ACUTE PAIN OF RIGHT KNEE: ICD-10-CM

## 2019-04-15 PROCEDURE — 97110 THERAPEUTIC EXERCISES: CPT | Mod: PN | Performed by: PHYSICAL THERAPIST

## 2019-04-15 PROCEDURE — 97140 MANUAL THERAPY 1/> REGIONS: CPT | Mod: PN | Performed by: PHYSICAL THERAPIST

## 2019-04-15 NOTE — PROGRESS NOTES
"  Physical Therapy Daily Treatment Note     [id number]    Therapy Diagnosis:   Encounter Diagnoses   Name Primary?    Acute pain of right knee     Joint stiffness of knee, right      Physician: Jamel Li NP    Visit Date: 4/15/2019    Physician Orders: PT Eval and Treat   Medical Diagnosis from Referral: M25.561 (ICD-10-CM) - Right knee pain, unspecified chronicity   Evaluation Date: 2/26/2019  Authorization Period Expiration: 2/11/2020  Plan of Care Expiration: 5/24/2019  Visit # / Visits authorized: 7/20    Time In: 2:00 PM  Time Out: 2:55 PM  Total Billable Time: 55 minutes (1:1 with PT 30 min)    Precautions: Standard    Subjective     Pt reports: overall still feeling a lot of improvement. Continues with tightness to posterior knee, but no pain to anterior knee  She was compliant with home exercise program.  Response to previous treatment: some relief with dry needling  Functional change: no change    Pain: 1/10  Location: right knee      Objective     Arben received therapeutic exercises to develop strength, endurance, ROM, flexibility and core stabilization for 45 minutes including:  SLR flex 2# 2 x 15  SLR abd 2# 2 x 15  SLR ext 2# 2 x 15  Bridges 2 x 15  Clam with GTB 2 x 15  [place] with YTB 2 x 15  Seated HS stretch 3 x 30"  Eccentric heel raises 2 x 15  GS SB 3 x 30"      Arben received the following manual therapy techniques:    00 min x Application of TDN: Pt educated on benefits and potential side effects of dry needling. Educated pt on benefits, precautions, side effects following TDN. Educated pt to use heat following treatment sessions if pt is experiencing pain or soreness. Pt verbalized good understanding of education.  Pt signed written consent to dry needling. Pt gave verbal consent for DN    Pt received dry needling to the below listed muscles using 40 mm needles.  R knee 9 point OA protocol with E-stim applied through 3 channels at 2 Hz and mA to " tolerance.     00 min x preparation and application of Kineseotape. Ystrip for patellar tracking, I strip for decompression and pain relief. Patient received education regarding appropriate care and removal of Kinesiotape. Patient instructed in proper removal techniques if skin irritation occurs.      10 min x IASTM to distal HS and proximal gastroc      Home Exercises Provided and Patient Education Provided     Education provided:   - Pt educated regarding risks and benefits of dry needling. Pt declined copy of consent form. Pt educated on use of heat as needed for soreness following dry needling    Written Home Exercises Provided: Patient instructed to cont prior HEP.  Exercises were reviewed and Arben was able to demonstrate them prior to the end of the session.  Arben demonstrated good  understanding of the education provided.     See EMR under Patient Instructions for exercises provided 2/26/2019.    Assessment     Good tolerance to all treatment today. Pt demonstrated good independence with therex, requiring only min verbal cuing for exercises. With STM continues with fibrosis noted primarily to lateral gastroc head and lateral distal HS, no erythema noted today.  Arben is progressing well towards her goals.   Pt prognosis is Good.     Pt will continue to benefit from skilled outpatient physical therapy to address the deficits listed in the problem list box on initial evaluation, provide pt/family education and to maximize pt's level of independence in the home and community environment.     Pt's spiritual, cultural and educational needs considered and pt agreeable to plan of care and goals.     Anticipated barriers to physical therapy: none    Goals: IE 2/26/2019  Short Term Goals (4 weeks)  1. Pt will demonstrate improvements in B knee strength >/=4/5 for ease with stair climbing. Progressing, not met  2. Pt will report <2/10 pain within the R knee for ease with ADL's. Progressing, not met     Long Term Goals  (8 weeks)  1. Pt will demonstrate >/=4+/5 strength within B LE for ease with house hold chores and climbing stairs. Progressing, not met  2. Pt will report being independent with her HEP for maintenance of improvements gained during therapy sessions. Progressing, not met  3. Pt will report <1/10 pain within the R knee for ease with ADLs. Progressing, not met  4. Pt will demonstrate ambulation x 300 ft without AD or antalgic gait to improve functional mobility in community. Progressing, not met      Plan     Continue plan of care with focus on improving B LE strengthening. Continue dry needling as needed to reduce pain.  Plan of care Certification: 2/26/2019 to 5/24/2019.     Outpatient Physical Therapy 2 times weekly for 12 weeks to include the following interventions: Gait Training, Manual Therapy, Moist Heat/ Ice, Patient Education, Therapeutic Exercise and Dry Needling.      Kadie Yancey, PT

## 2019-04-16 ENCOUNTER — CLINICAL SUPPORT (OUTPATIENT)
Dept: OTOLARYNGOLOGY | Facility: CLINIC | Age: 64
End: 2019-04-16
Payer: MEDICARE

## 2019-04-16 DIAGNOSIS — Z71.89 ENCOUNTER FOR HEARING AID CONSULTATION: Primary | ICD-10-CM

## 2019-04-16 PROCEDURE — 99499 NO LOS: ICD-10-PCS | Mod: S$GLB,,, | Performed by: AUDIOLOGIST-HEARING AID FITTER

## 2019-04-16 PROCEDURE — 99499 UNLISTED E&M SERVICE: CPT | Mod: S$GLB,,, | Performed by: AUDIOLOGIST-HEARING AID FITTER

## 2019-04-30 ENCOUNTER — CLINICAL SUPPORT (OUTPATIENT)
Dept: REHABILITATION | Facility: OTHER | Age: 64
End: 2019-04-30
Payer: MEDICARE

## 2019-04-30 DIAGNOSIS — M25.561 ACUTE PAIN OF RIGHT KNEE: ICD-10-CM

## 2019-04-30 DIAGNOSIS — M25.661 JOINT STIFFNESS OF KNEE, RIGHT: ICD-10-CM

## 2019-04-30 PROCEDURE — 97110 THERAPEUTIC EXERCISES: CPT | Mod: PN | Performed by: PHYSICAL THERAPIST

## 2019-04-30 NOTE — PROGRESS NOTES
"  Physical Therapy Daily Treatment Note     [id number]    Therapy Diagnosis:   Encounter Diagnoses   Name Primary?    Acute pain of right knee     Joint stiffness of knee, right      Physician: Jamel Li NP    Visit Date: 4/30/2019    Physician Orders: PT Eval and Treat   Medical Diagnosis from Referral: M25.561 (ICD-10-CM) - Right knee pain, unspecified chronicity   Evaluation Date: 2/26/2019  Authorization Period Expiration: 2/11/2020  Plan of Care Expiration: 5/24/2019  Visit # / Visits authorized: 8/20    Time In: 2:00 PM  Time Out: 2:55 PM  Total Billable Time: 55 minutes (1:1 with PT 30 min)    Precautions: Standard    Subjective     Pt reports: continues to feel better overall, but still having discomfort to posterior R knee. She says it feels tight when she bends knee back.   She was compliant with home exercise program.  Response to previous treatment: some relief with dry needling  Functional change: no change    Pain: 1/10  Location: right knee      Objective     Arben received therapeutic exercises to develop strength, endurance, ROM, flexibility and core stabilization for 45 minutes including:  SLR flex 3# 2 x 10  SLR abd 3# 2 x 10  SLR ext 3# 2 x 10  Bridges 2 x 15  Clam with GTB 2 x 15  [place] with YTB 2 x 15  Seated HS stretch 3 x 30"  Eccentric heel raises 2 x 15  GS SB 3 x 30"      Arben received the following manual therapy techniques:    00 min x Application of TDN: Pt educated on benefits and potential side effects of dry needling. Educated pt on benefits, precautions, side effects following TDN. Educated pt to use heat following treatment sessions if pt is experiencing pain or soreness. Pt verbalized good understanding of education.  Pt signed written consent to dry needling. Pt gave verbal consent for DN    Pt received dry needling to the below listed muscles using 40 mm needles.  R knee 9 point OA protocol with E-stim applied through 3 channels at " 2 Hz and mA to tolerance.     00 min x preparation and application of Kineseotape. Ystrip for patellar tracking, I strip for decompression and pain relief. Patient received education regarding appropriate care and removal of Kinesiotape. Patient instructed in proper removal techniques if skin irritation occurs.      10 min x IASTM to distal HS and proximal gastroc with rolling pin      Home Exercises Provided and Patient Education Provided     Education provided:   - use of rolling pin for home    Written Home Exercises Provided: Patient instructed to cont prior HEP.  Exercises were reviewed and Arben was able to demonstrate them prior to the end of the session.  Arben demonstrated good  understanding of the education provided.     See EMR under Patient Instructions for exercises provided 2/26/2019.    Assessment     Good tolerance to all therex today. Increased weight with SLR series, good tolerance with decreased repetitions. Rolling pin used with STM massaged today with pt reporting decreased feeling of tightness and improved knee flexion following treatment. Educated on use of rolling pin for self-massage at home, returns good understanding.   Arben is progressing well towards her goals.   Pt prognosis is Good.     Pt will continue to benefit from skilled outpatient physical therapy to address the deficits listed in the problem list box on initial evaluation, provide pt/family education and to maximize pt's level of independence in the home and community environment.     Pt's spiritual, cultural and educational needs considered and pt agreeable to plan of care and goals.     Anticipated barriers to physical therapy: none    Goals: IE 2/26/2019  Short Term Goals (4 weeks)  1. Pt will demonstrate improvements in B knee strength >/=4/5 for ease with stair climbing. Progressing, not met  2. Pt will report <2/10 pain within the R knee for ease with ADL's. Progressing, not met     Long Term Goals (8 weeks)  1. Pt will  demonstrate >/=4+/5 strength within B LE for ease with house hold chores and climbing stairs. Progressing, not met  2. Pt will report being independent with her HEP for maintenance of improvements gained during therapy sessions. Progressing, not met  3. Pt will report <1/10 pain within the R knee for ease with ADLs. Progressing, not met  4. Pt will demonstrate ambulation x 300 ft without AD or antalgic gait to improve functional mobility in community. Progressing, not met      Plan     Continue plan of care with focus on improving B LE strengthening. Continue dry needling as needed to reduce pain.  Plan of care Certification: 2/26/2019 to 5/24/2019.     Outpatient Physical Therapy 2 times weekly for 12 weeks to include the following interventions: Gait Training, Manual Therapy, Moist Heat/ Ice, Patient Education, Therapeutic Exercise and Dry Needling.      Kadie Yancey, PT

## 2019-05-07 ENCOUNTER — CLINICAL SUPPORT (OUTPATIENT)
Dept: REHABILITATION | Facility: OTHER | Age: 64
End: 2019-05-07
Payer: MEDICARE

## 2019-05-07 DIAGNOSIS — M25.561 ACUTE PAIN OF RIGHT KNEE: ICD-10-CM

## 2019-05-07 DIAGNOSIS — M25.661 JOINT STIFFNESS OF KNEE, RIGHT: ICD-10-CM

## 2019-05-07 PROCEDURE — 97110 THERAPEUTIC EXERCISES: CPT | Mod: PN | Performed by: PHYSICAL THERAPIST

## 2019-05-09 ENCOUNTER — CLINICAL SUPPORT (OUTPATIENT)
Dept: OTOLARYNGOLOGY | Facility: CLINIC | Age: 64
End: 2019-05-09

## 2019-05-09 DIAGNOSIS — Z46.1 ENCOUNTER FOR FITTING AND ADJUSTMENT OF HEARING AID OF BOTH EARS: Primary | ICD-10-CM

## 2019-05-09 PROCEDURE — 99499 UNLISTED E&M SERVICE: CPT | Mod: S$GLB,,, | Performed by: AUDIOLOGIST-HEARING AID FITTER

## 2019-05-09 PROCEDURE — 99499 NO LOS: ICD-10-PCS | Mod: S$GLB,,, | Performed by: AUDIOLOGIST-HEARING AID FITTER

## 2019-05-09 NOTE — PROGRESS NOTES
Rosenda Tavarez, CCC-A  Audiologist - [facility] [address] [place] [address]  [email or web address]  [phone]    Patient: Arben De Luna   MRN: [id number]  : 1955  OLIVAS: 2019      HEARING AID FITTING    Reviewed right/left - marking on both hearing aids and   Went over how to connect    Blinking green lights vs. solid  Yellow blinks  Red with on/off   on/off or hard shut down as referenced above  Powerpack    Patient has , power pack, plug, two storage cases -  user guide, power pack user guide, hearing aid user guide    Going to connect to phone at next visit    May want to look at price of TV connector - get price - give feedback from other patients - give two-week trial if desired    Connected hearing aids, verified patient and audiogram and   Verified acoustic parameters  Ran feedback measures AU  Set gain at 80% until follow-up  Maxed MPO  Demonstrated battery beeps  And volume control up and down synchronized hearing aids AU and short push vs. long push    Reviewed insertion both over the ear first with BTE and with inserting dome in ear first - whichever works best for her  Naturally used both hands - one to pull and one to push  Did great with insertion today for first try!    Appt 1 week from today at 12:00 pm - 19    Next appt - phone, datalog, patient feedback - physical fit with reference to wires - too long or short? Dome too big or too small?  Right ear no problem, left one is noticeably there - can feel - may need to address next week    In meanwhile, call or message      _______________________________  Rosenda Tavarez, JOANIE-A  Audiologist

## 2019-05-12 NOTE — PROGRESS NOTES
Rosenda Tavarez, CCC-A  Audiologist - [facility] [address] [place] [address]  [email or web address]  [phone]    Patient: Arben De Luna   MRN: [id number]  : 1955  OLIVAS: 3/25/2019      AUDIOLOGICAL EVALUATION    RECOMMENDATIONS:   It is recommended that she:  Follow up medically with a physician to assess asymmetrical hearing loss, tinnitus, and vertigo.  Receive binaural hearing aids to improve speech understanding.  Continue to receive audiological monitoring annually.  Use precaution and/or hearing protection in noisy environments.    If you should have any questions or concerns regarding the above information, please do not hesitate to contact me at [phone].      _______________________________  Rosenda Tavarez, JOANIE-A  Audiologist

## 2019-05-14 NOTE — PROGRESS NOTES
"  Physical Therapy Daily Treatment Note     Name: Arben Steinernes  Clinic Number: [id number]    Therapy Diagnosis:   Encounter Diagnoses   Name Primary?    Acute pain of right knee     Joint stiffness of knee, right      Physician: Jamel Li NP    Visit Date: 5/15/2019    Physician Orders: PT Eval and Treat   Medical Diagnosis from Referral: M25.561 (ICD-10-CM) - Right knee pain, unspecified chronicity   Evaluation Date: 2/26/2019  Authorization Period Expiration: 2/11/2020  Plan of Care Expiration: 5/24/2019  Visit # / Visits authorized: 10/20    Time In: 1:00 PM  Time Out: 1:40 PM  Total Billable Time: 40 minutes (1:1 with PT 40 min)    Precautions: Standard    Subjective     Pt reports: she's been having minimal pain to anterior/medial knee the past few days, but no pain at all to posterior knee. Still feels like she's ready for discharge.   She was compliant with home exercise program.  Response to previous treatment: some relief with dry needling  Functional change: no change    Pain: 2/10  Location: right knee      Objective     Arben received therapeutic exercises to develop strength, endurance, ROM, flexibility and core stabilization for 38 minutes including:  SLR flex 3# 2 x 10  SLR abd 3# 2 x 10  SLR ext 3# 2 x 10  prone quad stretch 3 x 30"  Bridges 2 x 15  Clam with GTB 2 x 15  Nestor with YTB 2 x 15  Seated HS stretch 3 x 30"  Eccentric heel raises 2 x 15 - NP  GS SB 3 x 30" - NP      Arben received the following manual therapy techniques:    00 min x Application of TDN: Pt educated on benefits and potential side effects of dry needling. Educated pt on benefits, precautions, side effects following TDN. Educated pt to use heat following treatment sessions if pt is experiencing pain or soreness. Pt verbalized good understanding of education.  Pt signed written consent to dry needling. Pt gave verbal consent for DN    Pt received dry needling to the below listed muscles using 40 mm needles.  R knee " 9 point OA protocol with E-stim applied through 3 channels at 2 Hz and mA to tolerance.     2 min x preparation and application of Kineseotape. Ystrip for patellar tracking, I strip for decompression and pain relief. Patient received education regarding appropriate care and removal of Kinesiotape. Patient instructed in proper removal techniques if skin irritation occurs.      00 min x IASTM to distal HS and proximal gastroc, and quad with rolling pin      5/15/2019   B LE MMT 5/5 in all planes  B knee ROM WFL    Home Exercises Provided and Patient Education Provided     Education provided:   - use of rolling pin for home    Written Home Exercises Provided: Patient instructed to cont prior HEP.  Exercises were reviewed and Arben was able to demonstrate them prior to the end of the session.  Arben demonstrated good  understanding of the education provided.     See EMR under Patient Instructions for exercises provided 2/26/2019.    Assessment     Arben has made excellent progress with therapy. Significant improvements with B LE strength, now demonstrating 5/5 B LE with MMT. Reports only minimal pain to anterior knee recently, responds well to application of kineseotape. Pt educated in self application of kineseotape for use as needed to control pain. 5/6 therapeutic goals met at this time. Based on progress, recommend discharge to independent HEP.    Pt's spiritual, cultural and educational needs considered and pt agreeable to plan of care and goals.     Anticipated barriers to physical therapy: none    Goals: IE 2/26/2019  Short Term Goals (4 weeks)  1. Pt will demonstrate improvements in B knee strength >/=4/5 for ease with stair climbing. Met 5/15/2019  2. Pt will report <2/10 pain within the R knee for ease with ADL's. Met 4/10/19     Long Term Goals (8 weeks)  1. Pt will demonstrate >/=4+/5 strength within B LE for ease with house hold chores and climbing stairs. Met 5/15/2019  2. Pt will report being independent  with her HEP for maintenance of improvements gained during therapy sessions. Met 5/15/2019  3. Pt will report <1/10 pain within the R knee for ease with ADLs. Progressing, not met  4. Pt will demonstrate ambulation x 300 ft without AD or antalgic gait to improve functional mobility in community. Met 5/7/2019      Plan     Discharge to independent HEP.      Kadie Yancey, PT

## 2019-05-15 ENCOUNTER — CLINICAL SUPPORT (OUTPATIENT)
Dept: REHABILITATION | Facility: OTHER | Age: 64
End: 2019-05-15
Payer: MEDICARE

## 2019-05-15 DIAGNOSIS — M25.661 JOINT STIFFNESS OF KNEE, RIGHT: ICD-10-CM

## 2019-05-15 DIAGNOSIS — M25.561 ACUTE PAIN OF RIGHT KNEE: ICD-10-CM

## 2019-05-15 PROCEDURE — 97110 THERAPEUTIC EXERCISES: CPT | Mod: PN | Performed by: PHYSICAL THERAPIST

## 2019-05-15 NOTE — PLAN OF CARE
Outpatient Therapy Discharge Summary     Name: Arben De Luna  [facility] Number: [id number]    Therapy Diagnosis:   Encounter Diagnoses   Name Primary?    Acute pain of right knee     Joint stiffness of knee, right      Physician: Jamel Li NP    Physician Orders: PT Eval and Treat   Medical Diagnosis from Referral: M25.561 (ICD-10-CM) - Right knee pain, unspecified chronicity   Evaluation Date: 2/26/2019      Date of Last visit: 5/15/2019  Total Visits Received: 10  Cancelled Visits: 4  No Show Visits: 0    Assessment    Goals: Pt has met 5/6 therapeutic goals. She continues with mild anterior knee pain, but this is now intermittent and manageable with HEP and kineseotape. Significant improvements in B LE strength to 5/5 with MMT.     Discharge reason: Patient has reached the maximum rehab potential for the present time    Plan   This patient is discharged from Physical Therapy

## 2019-05-16 ENCOUNTER — CLINICAL SUPPORT (OUTPATIENT)
Dept: OTOLARYNGOLOGY | Facility: CLINIC | Age: 64
End: 2019-05-16
Payer: MEDICARE

## 2019-05-16 DIAGNOSIS — Z46.1 ENCOUNTER FOR FITTING AND ADJUSTMENT OF HEARING AID OF BOTH EARS: Primary | ICD-10-CM

## 2019-05-16 PROCEDURE — 99499 NO LOS: ICD-10-PCS | Mod: S$GLB,,, | Performed by: AUDIOLOGIST-HEARING AID FITTER

## 2019-05-16 PROCEDURE — 99499 UNLISTED E&M SERVICE: CPT | Mod: S$GLB,,, | Performed by: AUDIOLOGIST-HEARING AID FITTER

## 2019-05-16 NOTE — PROGRESS NOTES
Rosenda Tavarez, CCC-A  Audiologist - [facility] [address] [place] [address]  [email or web address]  [phone]    Patient: Arben De Luna   MRN: [id number]  [address]  Home Phone [phone]   Work Phone Not on file.   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

## 2019-05-20 ENCOUNTER — OFFICE VISIT (OUTPATIENT)
Dept: URGENT CARE | Facility: CLINIC | Age: 64
End: 2019-05-20
Payer: MEDICARE

## 2019-05-20 VITALS
WEIGHT: 177 LBS | SYSTOLIC BLOOD PRESSURE: 128 MMHG | BODY MASS INDEX: 25.34 KG/M2 | OXYGEN SATURATION: 96 % | TEMPERATURE: 98 F | DIASTOLIC BLOOD PRESSURE: 75 MMHG | HEART RATE: 81 BPM | HEIGHT: 70 IN

## 2019-05-20 DIAGNOSIS — R09.82 POST-NASAL DRIP: ICD-10-CM

## 2019-05-20 DIAGNOSIS — J01.90 ACUTE SINUSITIS WITH SYMPTOMS > 10 DAYS: Primary | ICD-10-CM

## 2019-05-20 DIAGNOSIS — R05.8 PRODUCTIVE COUGH: ICD-10-CM

## 2019-05-20 PROCEDURE — 99214 PR OFFICE/OUTPT VISIT, EST, LEVL IV, 30-39 MIN: ICD-10-PCS | Mod: S$GLB,,, | Performed by: NURSE PRACTITIONER

## 2019-05-20 PROCEDURE — 99214 OFFICE O/P EST MOD 30 MIN: CPT | Mod: S$GLB,,, | Performed by: NURSE PRACTITIONER

## 2019-05-20 RX ORDER — GUAIFENESIN 600 MG/1
600 TABLET, EXTENDED RELEASE ORAL 2 TIMES DAILY
Qty: 30 TABLET | Refills: 0 | Status: SHIPPED | OUTPATIENT
Start: 2019-05-20 | End: 2019-06-04

## 2019-05-20 RX ORDER — CODEINE PHOSPHATE AND GUAIFENESIN 10; 100 MG/5ML; MG/5ML
10 SOLUTION ORAL NIGHTLY PRN
Qty: 70 ML | Refills: 0 | Status: SHIPPED | OUTPATIENT
Start: 2019-05-20 | End: 2019-05-27

## 2019-05-20 RX ORDER — AMOXICILLIN 875 MG/1
875 TABLET, FILM COATED ORAL 2 TIMES DAILY
Qty: 20 TABLET | Refills: 0 | Status: SHIPPED | OUTPATIENT
Start: 2019-05-20 | End: 2019-05-30

## 2019-05-20 NOTE — PROGRESS NOTES
"Subjective:       Patient ID: Arben De Luna is a 64 y.o. female.    Vitals:  height is 5' 10" (1.778 m) and weight is 80.3 kg (177 lb). Her temperature is 98.3 °F (36.8 °C). Her blood pressure is 128/75 and her pulse is 81. Her oxygen saturation is 96%.     Chief Complaint: Sinus Problem    Patient states she has been ill for the last 10 or 11 days.  Has been taking OTC medication without relief.  States that she cannot stop coughing.    Sinus Problem   This is a new problem. The current episode started 1 to 4 weeks ago (x1 1/2 wk). The problem has been gradually worsening since onset. There has been no fever. Associated symptoms include congestion and coughing. Pertinent negatives include no chills, diaphoresis, ear pain, shortness of breath, sinus pressure or sore throat. Treatments tried: otc sinus medication  The treatment provided mild relief.       Constitution: Negative for chills, sweating, fatigue and fever.   HENT: Positive for congestion. Negative for ear pain, sinus pain, sinus pressure, sore throat and voice change.    Neck: Negative for painful lymph nodes.   Eyes: Negative for eye redness.   Respiratory: Positive for cough and sputum production. Negative for chest tightness, bloody sputum, COPD, shortness of breath, stridor, wheezing and asthma.    Gastrointestinal: Negative for nausea and vomiting.   Musculoskeletal: Negative for muscle ache.   Skin: Negative for rash.   Allergic/Immunologic: Negative for seasonal allergies and asthma.   Hematologic/Lymphatic: Negative for swollen lymph nodes.       Objective:      Physical Exam   Constitutional: She is oriented to person, place, and time. Vital signs are normal. She appears well-developed and well-nourished. She is cooperative.  Non-toxic appearance. She does not appear ill. No distress.   HENT:   Head: Normocephalic and atraumatic.   Right Ear: Hearing, external ear and ear canal normal. A middle ear effusion is present.   Left Ear: Hearing, external " ear and ear canal normal. A middle ear effusion is present.   Nose: Mucosal edema and rhinorrhea present. No nasal deformity. No epistaxis. Right sinus exhibits maxillary sinus tenderness. Right sinus exhibits no frontal sinus tenderness. Left sinus exhibits maxillary sinus tenderness. Left sinus exhibits no frontal sinus tenderness.   Mouth/Throat: Uvula is midline and mucous membranes are normal. No trismus in the jaw. Normal dentition. No uvula swelling. Posterior oropharyngeal erythema (Purulent postnasal drainage) present.   Eyes: Pupils are equal, round, and reactive to light. Conjunctivae, EOM and lids are normal. No scleral icterus.   Sclera clear bilat   Neck: Trachea normal, normal range of motion, full passive range of motion without pain and phonation normal. Neck supple. No spinous process tenderness and no muscular tenderness present. No neck rigidity. Normal range of motion present.   Cardiovascular: Normal rate, regular rhythm, normal heart sounds and normal pulses.   Pulmonary/Chest: Effort normal and breath sounds normal. No respiratory distress.   Abdominal: Normal appearance.   Musculoskeletal: Normal range of motion. She exhibits no edema or deformity.   Lymphadenopathy:     She has no cervical adenopathy.   Neurological: She is alert and oriented to person, place, and time. She exhibits normal muscle tone. Coordination normal.   Skin: Skin is warm, dry and intact. Capillary refill takes less than 2 seconds. She is not diaphoretic. No pallor.   Psychiatric: She has a normal mood and affect. Her speech is normal and behavior is normal. Judgment and thought content normal. Cognition and memory are normal.   Nursing note and vitals reviewed.      Assessment:       1. Acute sinusitis with symptoms > 10 days    2. Productive cough    3. Post-nasal drip        Plan:         Acute sinusitis with symptoms > 10 days  -     guaiFENesin (MUCINEX) 600 mg 12 hr tablet; Take 1 tablet (600 mg total) by mouth 2  (two) times daily. for 15 days  Dispense: 30 tablet; Refill: 0  -     amoxicillin (AMOXIL) 875 MG tablet; Take 1 tablet (875 mg total) by mouth 2 (two) times daily. for 10 days  Dispense: 20 tablet; Refill: 0  -     guaifenesin-codeine 100-10 mg/5 ml (CHERATUSSIN AC)  mg/5 mL syrup; Take 10 mLs by mouth nightly as needed for Cough.  Dispense: 70 mL; Refill: 0    Productive cough  -     guaiFENesin (MUCINEX) 600 mg 12 hr tablet; Take 1 tablet (600 mg total) by mouth 2 (two) times daily. for 15 days  Dispense: 30 tablet; Refill: 0  -     guaifenesin-codeine 100-10 mg/5 ml (CHERATUSSIN AC)  mg/5 mL syrup; Take 10 mLs by mouth nightly as needed for Cough.  Dispense: 70 mL; Refill: 0    Post-nasal drip  -     guaiFENesin (MUCINEX) 600 mg 12 hr tablet; Take 1 tablet (600 mg total) by mouth 2 (two) times daily. for 15 days  Dispense: 30 tablet; Refill: 0      Patient Instructions       Please drink plenty of fluids.  Please get plenty of rest.  Please return here or go to the Emergency Department for any concerns or worsening of condition.  If you were given wait & see antibiotics, please wait 3-5 days before taking them, and only take them if your symptoms have worsened or not improved.  If you do begin taking the antibiotics, please take them to completion.  If you were prescribed antibiotics, please take them to completion.  If you were prescribed a narcotic medication, do not drive or operate heavy equipment or machinery while taking these medications.  If you do not have Hypertension or any history of palpitations, it is ok to take over the counter Sudafed or Mucinex D or Allegra-D or Claritin-D or Zyrtec-D.  If you do take one of the above, it is ok to combine that with plain over the counter Mucinex or Allegra or Claritin or Zyrtec.  If for example you are taking Zyrtec -D, you can combine that with Mucinex, but not Mucinex-D.  If you are taking Mucinex-D, you can combine that with plain Allegra or  Claritin or Zyrtec.   If you do have Hypertension or palpitations, it is safe to take Coricidin HBP for relief of sinus symptoms.  We recommend you take over the counter Flonase (Fluticasone) or another nasally inhaled steroid unless you are already taking one.  Nasal irrigation with a saline spray or Netti Pot like device per their directions is also recommended.  If not allergic, please take over the counter Tylenol (Acetaminophen) and/or Motrin (Ibuprofen) as directed for control of pain and/or fever.  Please follow up with your primary care doctor or specialist as needed.    If you  smoke, please stop smoking.    Acute Bacterial Rhinosinusitis (ABRS)    Acute bacterial rhinosinusitis (ABRS) is an infection of your nasal cavity and sinuses. Its caused by bacteria. Acute means that youve had symptoms for less than 12 weeks.  Understanding your sinuses  The nasal cavity is the large air-filled space behind your nose. The sinuses are a group of spaces formed by the bones of your face. They connect with your nasal cavity. ABRS causes the tissue lining these spaces to become inflamed. Mucus may not drain normally. This leads to facial pain and other symptoms.  What causes ABRS?  ABRS most often follows an upper respiratory infection caused by a virus. Bacteria then infect the lining of your nasal cavity and sinuses. But you can also get ABRS if you have:  · Nasal allergies  · Long-term nasal swelling and congestion not caused by allergies  · Blockage in the nose  Symptoms of ABRS  The symptoms of ABRS may be different for each person, and can include:  · Nasal congestion  · Runny nose  · Fluid draining from the nose down the throat (postnasal drip)  · Headache  · Cough  · Pain in the sinuses  · Thick, colored fluid from the nose (mucus)  · Fever  Diagnosing ABRS  ABRS may be diagnosed if youve had an upper respiratory infection like a cold and cough for longer than 10 to 14 days. Your health care provider will ask  about your symptoms and your medical history. The provider will check your vital signs, including your temperature. Youll have a physical exam. The health care provider will check your ears, nose, and throat. You likely wont need any tests. If ABRS comes back, you may have a culture or other tests.  Treatment for ABRS  Treatment may include:  · Antibiotic medicine. This is for symptoms that last for at least 10 to 14 days.  · Nasal corticosteroid medicine. Drops or spray used in the nose can lessen swelling and congestion.  · Over-the-counter pain medicine. This is to lessen sinus pain and pressure.  · Nasal decongestant medicine. Spray or drops may help to lessen congestion. Do not use them for more than a few days.  · Salt wash (saline irrigation). This can help to loosen mucus.  Possible complications of ABRS  ABRS may come back or become long-term (chronic).  In rare cases, ABRS may cause complications such as:   · Inflamed tissue around the brain and spinal cord (meningitis)  · Inflamed tissue around the eyes (orbital cellulitis)  · Inflamed bones around the sinuses (osteitis)  These problems may need to be treated in a hospital with intravenous (IV) antibiotic medicine or surgery.  When to call the health care provider  Call your health care provider if you have any of the following:  · Symptoms that dont get better, or get worse  · Symptoms that dont get better after 3 to 5 days on antibiotics  · Trouble seeing  · Swelling around your eyes  · Confusion or trouble staying awake   Date Last Reviewed: 3/3/2015  © 8695-7525 [employer]. [address] [place] [address], [place] [address]. All rights reserved. This information is not intended as a substitute for professional medical care. Always follow your healthcare professional's instructions.

## 2019-05-20 NOTE — PATIENT INSTRUCTIONS
Please drink plenty of fluids.  Please get plenty of rest.  Please return here or go to the Emergency Department for any concerns or worsening of condition.  If you were given wait & see antibiotics, please wait 3-5 days before taking them, and only take them if your symptoms have worsened or not improved.  If you do begin taking the antibiotics, please take them to completion.  If you were prescribed antibiotics, please take them to completion.  If you were prescribed a narcotic medication, do not drive or operate heavy equipment or machinery while taking these medications.  If you do not have Hypertension or any history of palpitations, it is ok to take over the counter Sudafed or Mucinex D or Allegra-D or Claritin-D or Zyrtec-D.  If you do take one of the above, it is ok to combine that with plain over the counter Mucinex or Allegra or Claritin or Zyrtec.  If for example you are taking Zyrtec -D, you can combine that with Mucinex, but not Mucinex-D.  If you are taking Mucinex-D, you can combine that with plain Allegra or Claritin or Zyrtec.   If you do have Hypertension or palpitations, it is safe to take Coricidin HBP for relief of sinus symptoms.  We recommend you take over the counter Flonase (Fluticasone) or another nasally inhaled steroid unless you are already taking one.  Nasal irrigation with a saline spray or Netti Pot like device per their directions is also recommended.  If not allergic, please take over the counter Tylenol (Acetaminophen) and/or Motrin (Ibuprofen) as directed for control of pain and/or fever.  Please follow up with your primary care doctor or specialist as needed.    If you  smoke, please stop smoking.    Acute Bacterial Rhinosinusitis (ABRS)    Acute bacterial rhinosinusitis (ABRS) is an infection of your nasal cavity and sinuses. Its caused by bacteria. Acute means that youve had symptoms for less than 12 weeks.  Understanding your sinuses  The nasal cavity is the large  air-filled space behind your nose. The sinuses are a group of spaces formed by the bones of your face. They connect with your nasal cavity. ABRS causes the tissue lining these spaces to become inflamed. Mucus may not drain normally. This leads to facial pain and other symptoms.  What causes ABRS?  ABRS most often follows an upper respiratory infection caused by a virus. Bacteria then infect the lining of your nasal cavity and sinuses. But you can also get ABRS if you have:  · Nasal allergies  · Long-term nasal swelling and congestion not caused by allergies  · Blockage in the nose  Symptoms of ABRS  The symptoms of ABRS may be different for each person, and can include:  · Nasal congestion  · Runny nose  · Fluid draining from the nose down the throat (postnasal drip)  · Headache  · Cough  · Pain in the sinuses  · Thick, colored fluid from the nose (mucus)  · Fever  Diagnosing ABRS  ABRS may be diagnosed if youve had an upper respiratory infection like a cold and cough for longer than 10 to 14 days. Your health care provider will ask about your symptoms and your medical history. The provider will check your vital signs, including your temperature. Youll have a physical exam. The health care provider will check your ears, nose, and throat. You likely wont need any tests. If ABRS comes back, you may have a culture or other tests.  Treatment for ABRS  Treatment may include:  · Antibiotic medicine. This is for symptoms that last for at least 10 to 14 days.  · Nasal corticosteroid medicine. Drops or spray used in the nose can lessen swelling and congestion.  · Over-the-counter pain medicine. This is to lessen sinus pain and pressure.  · Nasal decongestant medicine. Spray or drops may help to lessen congestion. Do not use them for more than a few days.  · Salt wash (saline irrigation). This can help to loosen mucus.  Possible complications of ABRS  ABRS may come back or become long-term (chronic).  In rare cases, ABRS  may cause complications such as:   · Inflamed tissue around the brain and spinal cord (meningitis)  · Inflamed tissue around the eyes (orbital cellulitis)  · Inflamed bones around the sinuses (osteitis)  These problems may need to be treated in a hospital with intravenous (IV) antibiotic medicine or surgery.  When to call the health care provider  Call your health care provider if you have any of the following:  · Symptoms that dont get better, or get worse  · Symptoms that dont get better after 3 to 5 days on antibiotics  · Trouble seeing  · Swelling around your eyes  · Confusion or trouble staying awake   Date Last Reviewed: 3/3/2015  © 2320-1778 [employer]. [address] [place] [address], [place] [address]. All rights reserved. This information is not intended as a substitute for professional medical care. Always follow your healthcare professional's instructions.

## 2019-05-24 ENCOUNTER — CLINICAL SUPPORT (OUTPATIENT)
Dept: OTOLARYNGOLOGY | Facility: CLINIC | Age: 64
End: 2019-05-24
Payer: MEDICARE

## 2019-05-24 DIAGNOSIS — Z46.1 ENCOUNTER FOR FITTING AND ADJUSTMENT OF HEARING AID OF BOTH EARS: Primary | ICD-10-CM

## 2019-05-24 PROCEDURE — 99499 UNLISTED E&M SERVICE: CPT | Mod: S$GLB,,, | Performed by: AUDIOLOGIST-HEARING AID FITTER

## 2019-05-24 PROCEDURE — 99499 NO LOS: ICD-10-PCS | Mod: S$GLB,,, | Performed by: AUDIOLOGIST-HEARING AID FITTER

## 2019-05-24 NOTE — PROGRESS NOTES
Rosenda Tavarez, CCC-A  Audiologist - [facility] [address] [place] [address]  [email or web address]  [phone]    Patient: Arben De Luna   MRN: [id number]  [address]  Home Phone [phone]   Work Phone Not on file.   Mobile [phone]   : 1955  OLIVAS: 2019      HEARING AID FOLLOW-UP      Arben De Luna reports physical fit is better on the Right side, but still very noticeable on the Left side.  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  Phone connection was great!    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

## 2019-05-27 ENCOUNTER — PATIENT MESSAGE (OUTPATIENT)
Dept: INTERNAL MEDICINE | Facility: CLINIC | Age: 64
End: 2019-05-27

## 2019-06-03 ENCOUNTER — OFFICE VISIT (OUTPATIENT)
Dept: INTERNAL MEDICINE | Facility: CLINIC | Age: 64
End: 2019-06-03
Attending: INTERNAL MEDICINE
Payer: MEDICARE

## 2019-06-03 VITALS
SYSTOLIC BLOOD PRESSURE: 101 MMHG | WEIGHT: 171.06 LBS | OXYGEN SATURATION: 96 % | HEART RATE: 79 BPM | DIASTOLIC BLOOD PRESSURE: 70 MMHG | BODY MASS INDEX: 24.49 KG/M2 | HEIGHT: 70 IN

## 2019-06-03 DIAGNOSIS — R05.9 COUGH: Primary | ICD-10-CM

## 2019-06-03 DIAGNOSIS — R42 DIZZINESS: ICD-10-CM

## 2019-06-03 DIAGNOSIS — J30.1 NON-SEASONAL ALLERGIC RHINITIS DUE TO POLLEN: ICD-10-CM

## 2019-06-03 DIAGNOSIS — R09.89 OTHER SPECIFIED SYMPTOMS AND SIGNS INVOLVING THE CIRCULATORY AND RESPIRATORY SYSTEMS: ICD-10-CM

## 2019-06-03 PROCEDURE — 99214 PR OFFICE/OUTPT VISIT, EST, LEVL IV, 30-39 MIN: ICD-10-PCS | Mod: S$PBB,,, | Performed by: INTERNAL MEDICINE

## 2019-06-03 PROCEDURE — 99214 OFFICE O/P EST MOD 30 MIN: CPT | Mod: S$PBB,,, | Performed by: INTERNAL MEDICINE

## 2019-06-03 PROCEDURE — 99213 OFFICE O/P EST LOW 20 MIN: CPT | Mod: PBBFAC | Performed by: INTERNAL MEDICINE

## 2019-06-03 PROCEDURE — 99999 PR PBB SHADOW E&M-EST. PATIENT-LVL III: ICD-10-PCS | Mod: PBBFAC,,, | Performed by: INTERNAL MEDICINE

## 2019-06-03 PROCEDURE — 99999 PR PBB SHADOW E&M-EST. PATIENT-LVL III: CPT | Mod: PBBFAC,,, | Performed by: INTERNAL MEDICINE

## 2019-06-03 NOTE — PROGRESS NOTES
Subjective:       Patient ID: Arben De Luna is a 64 y.o. female.    Chief Complaint: Sinus Problem; Sinusitis; and Dizziness    Here for urgent visit  Pt normally cared for by my colleague Dr. To and patient is new to me. I have reviewed patient's past medical, surgical, and social history in addition to MAR and allergies.     2 weeks prior seen in urgent care for 2 week hx of nasal congestion and cough. Rx augmentin, cough supressant,    Maxillary sinus pressure and cough continued mucinex, night time cough supressant and OTC fluticasone. Three times in the past year she had balance issues. She denies classic vertigo but feels they consistently occur when standing to get out of bed in the morning. She has tried vertigo maneuvers and this helped. Symptoms last for several minutes, 10-15 at the most. She denies dizziness or lightheadedness in any other scenario. No dizziness with exertion. No focal weakness, numbness/tingling, dysphagia, tinnitus, hearing loss, vision loss, word finding difficulty, progression of dizziness, LOC, palpitations, CP at rest or with exertion. Allergy symptoms all started in the past 6-12 months, 1 year after moving to [place] from [place].      Review of Systems   Constitutional: Negative for chills, fatigue, fever and unexpected weight change.   HENT: Positive for congestion. Negative for ear pain, hearing loss, postnasal drip, tinnitus, trouble swallowing and voice change.    Respiratory: Positive for cough. Negative for chest tightness, shortness of breath and wheezing.    Cardiovascular: Negative for chest pain, palpitations and leg swelling.   Gastrointestinal: Negative for abdominal pain, blood in stool, diarrhea, nausea and vomiting.   Endocrine: Negative for polydipsia, polyphagia and polyuria.   Genitourinary: Negative for difficulty urinating, dysuria, hematuria and vaginal bleeding.   Skin: Negative for rash.   Allergic/Immunologic: Negative for food allergies.  "  Neurological: Negative for dizziness, numbness and headaches.   Hematological: Does not bruise/bleed easily.   Psychiatric/Behavioral: The patient is not nervous/anxious.        Objective:      Vitals:    06/03/19 1508   BP: 101/70   Pulse: 79   SpO2: 96%   Weight: 77.6 kg (171 lb 1.2 oz)   Height: 5' 10" (1.778 m)      Physical Exam   Constitutional: She is oriented to person, place, and time. She appears well-developed and well-nourished.   HENT:   Head: Normocephalic and atraumatic.   Right Ear: Tympanic membrane, external ear and ear canal normal.   Left Ear: Tympanic membrane, external ear and ear canal normal.   Nose: No mucosal edema or rhinorrhea.   Mouth/Throat: No oropharyngeal exudate, posterior oropharyngeal edema or posterior oropharyngeal erythema.   Eyes: Conjunctivae and EOM are normal.   Pulmonary/Chest: Effort normal and breath sounds normal. No respiratory distress. She has no wheezes.   Abdominal: She exhibits no distension.   Musculoskeletal: She exhibits no edema.   Lymphadenopathy:     She has no cervical adenopathy.   Neurological: She is alert and oriented to person, place, and time.   Skin: Skin is warm and dry. No rash noted.       Assessment:       1. Cough    2. Non-seasonal allergic rhinitis due to pollen    3. Dizziness    4. Other specified symptoms and signs involving the circulatory and respiratory systems         Plan:       Arben was seen today for sinus problem, sinusitis and dizziness.    Diagnoses and all orders for this visit:    Cough   Secondary to post nasal drip. sinsus care discussed.    Non-seasonal allergic rhinitis due to pollen   1)Antihistamines(Allegra, Claritin, Xzyal, Zyrtec)  2)Nasal Steroids (Nasocort, Rhinocort, Flonase)  3)Distilled salt water sinus rinses via neti pots or products such as Sourav Med Sinus Rinse or Sinugator. Must wash container or device and use bottled water to avoid introducing infection.   You can can use (as directed) any combination of " these three things every day of your life if needed in order to treat or control your symptoms. Brand name use of medications is not necessary    Dizziness   -positional dizziness likely related to uncontrolled sinus congestion. She has appt with ENT tomorrow.   -     US Carotid Bilateral; Future    Other specified symptoms and signs involving the circulatory and respiratory systems   -     US Carotid Bilateral; Future           Darrel Melendez MD  Internal Medicine-[facility]        Side effects of medication(s) were discussed in detail and patient voiced understanding.  Patient will call back for any issues or complications.

## 2019-06-04 ENCOUNTER — CLINICAL SUPPORT (OUTPATIENT)
Dept: OTOLARYNGOLOGY | Facility: CLINIC | Age: 64
End: 2019-06-04
Payer: MEDICARE

## 2019-06-04 ENCOUNTER — OFFICE VISIT (OUTPATIENT)
Dept: OTOLARYNGOLOGY | Facility: CLINIC | Age: 64
End: 2019-06-04
Payer: MEDICARE

## 2019-06-04 ENCOUNTER — HOSPITAL ENCOUNTER (OUTPATIENT)
Dept: RADIOLOGY | Facility: OTHER | Age: 64
Discharge: HOME | End: 2019-06-04
Attending: INTERNAL MEDICINE
Payer: MEDICARE

## 2019-06-04 VITALS
HEART RATE: 70 BPM | WEIGHT: 171 LBS | SYSTOLIC BLOOD PRESSURE: 125 MMHG | BODY MASS INDEX: 24.48 KG/M2 | DIASTOLIC BLOOD PRESSURE: 80 MMHG | HEIGHT: 70 IN

## 2019-06-04 DIAGNOSIS — H90.3 SENSORINEURAL HEARING LOSS (SNHL) OF BOTH EARS: ICD-10-CM

## 2019-06-04 DIAGNOSIS — H93.13 TINNITUS OF BOTH EARS: ICD-10-CM

## 2019-06-04 DIAGNOSIS — R09.89 OTHER SPECIFIED SYMPTOMS AND SIGNS INVOLVING THE CIRCULATORY AND RESPIRATORY SYSTEMS: ICD-10-CM

## 2019-06-04 DIAGNOSIS — H69.93 DYSFUNCTION OF BOTH EUSTACHIAN TUBES: ICD-10-CM

## 2019-06-04 DIAGNOSIS — R42 VERTIGO: Primary | ICD-10-CM

## 2019-06-04 DIAGNOSIS — R53.83 FATIGUE, UNSPECIFIED TYPE: ICD-10-CM

## 2019-06-04 DIAGNOSIS — Z46.1 ENCOUNTER FOR FITTING AND ADJUSTMENT OF HEARING AID OF BOTH EARS: Primary | ICD-10-CM

## 2019-06-04 DIAGNOSIS — J34.2 NASAL SEPTAL DEVIATION: ICD-10-CM

## 2019-06-04 DIAGNOSIS — Z97.4 HEARING AID WORN: ICD-10-CM

## 2019-06-04 DIAGNOSIS — R42 DIZZINESS: ICD-10-CM

## 2019-06-04 DIAGNOSIS — J30.9 ALLERGIC RHINITIS, UNSPECIFIED SEASONALITY, UNSPECIFIED TRIGGER: ICD-10-CM

## 2019-06-04 PROCEDURE — 99499 UNLISTED E&M SERVICE: CPT | Mod: S$GLB,,, | Performed by: AUDIOLOGIST-HEARING AID FITTER

## 2019-06-04 PROCEDURE — 93880 EXTRACRANIAL BILAT STUDY: CPT | Mod: TC

## 2019-06-04 PROCEDURE — 99214 PR OFFICE/OUTPT VISIT, EST, LEVL IV, 30-39 MIN: ICD-10-PCS | Mod: S$GLB,,, | Performed by: SPECIALIST

## 2019-06-04 PROCEDURE — 99214 OFFICE O/P EST MOD 30 MIN: CPT | Mod: S$GLB,,, | Performed by: SPECIALIST

## 2019-06-04 PROCEDURE — 93880 EXTRACRANIAL BILAT STUDY: CPT | Mod: 26,,, | Performed by: RADIOLOGY

## 2019-06-04 PROCEDURE — 99499 NO LOS: ICD-10-PCS | Mod: S$GLB,,, | Performed by: AUDIOLOGIST-HEARING AID FITTER

## 2019-06-04 PROCEDURE — 93880 US CAROTID BILATERAL: ICD-10-PCS | Mod: 26,,, | Performed by: RADIOLOGY

## 2019-06-04 RX ORDER — AZELASTINE 1 MG/ML
SPRAY, METERED NASAL
Qty: 30 ML | Refills: 11 | Status: SHIPPED | OUTPATIENT
Start: 2019-06-04 | End: 2020-10-08

## 2019-06-04 RX ORDER — PREDNISONE 5 MG/1
5 TABLET ORAL DAILY
Qty: 21 TABLET | Refills: 0 | Status: SHIPPED | OUTPATIENT
Start: 2019-06-04 | End: 2019-06-10

## 2019-06-04 NOTE — PROGRESS NOTES
Subjective:       Patient ID: Arben De Luna is a 64 y.o. female.    Chief Complaint: Sinus Problem; Follow-up; and Hearing Loss (AUDIO DONE)    The patient is coming in for a follow-up visit.  She continues to have episodes go out of bed or into bed.  She is having some congestion and postnasal drip with coughing increase in fatigue.  She has been using Flonase on a daily basis.  She takes Xyzal for urticaria control.  She does have a significant history for motion sickness    Review of Systems   Constitutional: Positive for fatigue. Negative for activity change, appetite change, chills, fever and unexpected weight change.   HENT: Positive for congestion, ear pain, hearing loss (Wears bilateral hearing aids), postnasal drip, rhinorrhea, sinus pressure, sinus pain, sore throat and tinnitus. Negative for ear discharge, facial swelling, mouth sores, sneezing, trouble swallowing and voice change.    Eyes: Negative for photophobia, pain, discharge, redness, itching and visual disturbance.   Respiratory: Negative for apnea, cough, choking, shortness of breath and wheezing.    Cardiovascular: Negative for chest pain and palpitations.   Gastrointestinal: Positive for nausea and vomiting. Negative for abdominal distention and abdominal pain.   Musculoskeletal: Negative for arthralgias, myalgias, neck pain and neck stiffness.   Skin: Negative.  Negative for color change, pallor and rash.   Allergic/Immunologic: Positive for environmental allergies. Negative for food allergies and immunocompromised state.   Neurological: Positive for headaches. Negative for dizziness, facial asymmetry, speech difficulty, weakness, light-headedness and numbness.   Hematological: Negative for adenopathy. Does not bruise/bleed easily.   Psychiatric/Behavioral: Negative for confusion, decreased concentration and sleep disturbance.       Objective:      Physical Exam   Constitutional: She is oriented to person, place, and time. She appears  well-developed and well-nourished. She is cooperative.   HENT:   Head: Normocephalic.   Right Ear: External ear and ear canal normal. Tympanic membrane is retracted. Tympanic membrane mobility is abnormal.   Left Ear: External ear and ear canal normal. Tympanic membrane is retracted. Tympanic membrane mobility is abnormal.   Nose: Mucosal edema (cyanotic, boggy inferior turbinates bilaterally), rhinorrhea (clear mucus bilaterally) and septal deviation (To the right) present.   Mouth/Throat: Uvula is midline, oropharynx is clear and moist and mucous membranes are normal. No oral lesions.   Eyes: Pupils are equal, round, and reactive to light. EOM and lids are normal. Right eye exhibits no discharge and no exudate. Left eye exhibits no discharge and no exudate. Right conjunctiva is injected. Left conjunctiva is injected.   Neck: Trachea normal and normal range of motion. No muscular tenderness present. No tracheal deviation present. No thyroid mass and no thyromegaly present.   Cardiovascular: Normal rate, regular rhythm, normal heart sounds and normal pulses.   Pulmonary/Chest: Effort normal and breath sounds normal. No stridor. She has no decreased breath sounds. She has no wheezes. She has no rhonchi. She has no rales.   Abdominal: Soft. Bowel sounds are normal. There is no tenderness.   Musculoskeletal: Normal range of motion.   Lymphadenopathy:        Head (right side): No submental, no submandibular, no preauricular, no posterior auricular and no occipital adenopathy present.        Head (left side): No submental, no submandibular, no preauricular, no posterior auricular and no occipital adenopathy present.     She has no cervical adenopathy.   Neurological: She is alert and oriented to person, place, and time. She has normal strength. No cranial nerve deficit or sensory deficit. Gait normal.   Neuro otologic:  No nystatin miss, cranial nerves intact no focal or cerebellar signs, mildly wide-based gait, Romberg  negative, tandem Romberg falls to the right   Skin: Skin is warm and dry. No petechiae and no rash noted. No cyanosis. Nails show no clubbing.   Psychiatric: She has a normal mood and affect. Her speech is normal and behavior is normal. Judgment and thought content normal. Cognition and memory are normal.       Get-Hallpike maneuver:  Negative for BPPV bilaterally    Assessment:       1. Vertigo    2. Allergic rhinitis, unspecified seasonality, unspecified trigger    3. Dysfunction of both eustachian tubes    4. Fatigue, unspecified type    5. Nasal septal deviation    6. Sensorineural hearing loss (SNHL) of both ears    7. Tinnitus of both ears    8. Hearing aid worn        Plan:       I will schedule the patient for a VNG.  I will have her use a combination of Astelin and Flonase twice daily routinely.  I am having her start an oral 6 day prednisone taper tomorrow morning.  In 2 weeks if she is still having significant allergy/sinus symptoms I will add a nighttime dose of Singulair.  I will recheck her in 3 weeks

## 2019-06-04 NOTE — PROGRESS NOTES
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  Went to Patricia instead of Ivone because missed part of what someone was saying  Is it hearing or more brain, not necessarily attention  In morning meeting really focused on kids' voices and coworkers voices - ask question and waiting on answer but in some cases with  he starts talking and it takes a while for her to cue in to the fact that he's talking and what he's saying - told him to tap and get her attention first which is good, but hoping for better results with hearing aids    May need to order ReSound devices to try to see if can assist with clarity of smaller voices, discussed may lose Bluetooth phone calls and music because of android lack of connectivity at that time, pt understands and is willing to give that up if would mean could hear smaller voices, clearer - it seems at this time that if trial ended today she would not keep the hearing aids - is not yet receiving the benefit she desires     Will go ahead and make adjustments today and perhaps try a different device at next visit    Updated acoustic parameters now with cap domes AU, M receivers AU, but Right side is a 2M and left is a 3M  Recalculated  Re ran feedback manager  Allowed feedback manager to increase by 5 dB  Counseled lost a lot with going down to small cap domes - more comfort in ears, but  the problem is now we have lost acoustic sound quality with leakage - can of course get custom ear piece but need to wait to see which hearing aids work best for her before ordering those...    Removed feedback test and did manual work of manfred CORBIN  No other changes, saved  Will order ReSound 761s to try for next visit - may love change may not, will have to see      STILL NEEDS TO REVIEW CLEANING AT NEXT VISIT + TV CONNECTOR ON SHELF +SEE SHAI'S EMAIL RESPONSE

## 2019-06-13 ENCOUNTER — TELEPHONE (OUTPATIENT)
Dept: OTOLARYNGOLOGY | Facility: CLINIC | Age: 64
End: 2019-06-13

## 2019-06-13 NOTE — TELEPHONE ENCOUNTER
----- Message from Rafaela Hanna sent at 6/13/2019  3:56 PM CDT -----  [facility]  Appointment Request From: Arben De Luna    With Provider: ofe    Preferred Date Range: 6/11/2019 - 7/9/2019    Preferred Times: Any time    Reason for visit: ent vng test    Comments:  need to have test before July 9 visit with dr thakur

## 2019-06-18 ENCOUNTER — CLINICAL SUPPORT (OUTPATIENT)
Dept: OTOLARYNGOLOGY | Facility: CLINIC | Age: 64
End: 2019-06-18
Payer: MEDICARE

## 2019-06-18 DIAGNOSIS — Z46.1 ENCOUNTER FOR FITTING AND ADJUSTMENT OF HEARING AID OF BOTH EARS: Primary | ICD-10-CM

## 2019-06-18 PROCEDURE — 99499 UNLISTED E&M SERVICE: CPT | Mod: S$GLB,,, | Performed by: AUDIOLOGIST-HEARING AID FITTER

## 2019-06-18 PROCEDURE — 99499 NO LOS: ICD-10-PCS | Mod: S$GLB,,, | Performed by: AUDIOLOGIST-HEARING AID FITTER

## 2019-06-18 NOTE — PROGRESS NOTES
Rosenda Tavarez, CCC-A  Audiologist - [facility] [address] [place] [address]  [email or web address]  [phone]    Patient: Arben De Luna   MRN: [id number]  : 1955  OLIVAS: 2019      HEARING AID FOLLOW-UP    Trying ReSound devices today - looking for wow moment.  Meanwhile, if wouldn't have swapped over to ReSound, patient reports she would have wanted to try the larger dome back in the right ear because was continually slipping out of ear since last visit.      Due to house repairs, patient notes that if hearing aids are only increasing volume of sound and not assisting with clarity, will opt to return devices at this time, and revisit upon change to financial status.       Fit with size 3L MP and small open dome and then 2R MP with medium open dome - ran feedback calibration, added music program, decreased to comfort user and disabled VC - see session printouts.    Explained at length differences in processing algorithms and ngoc - style and battery are same.    Upon disconnect, changed to size 2L MP  with small open dome - much better per patient report  --> will need to change at next STEVEN session.    Gave charge case, plug, cable, hearing aids, user guide for HAs and , and connectivity guide long and connectivity guide short.    She left her phone in the car today, so she will pair at home.  Spent rest of session reviewing ngoc in demo mode on my phone.    Appt next week to review sound quality - better? Crisper? Louder? Clearer? - ngoc review - physical fit review - acoustic sound quality review - give at least 2 weeks to adapt to new processing algorithm.    Phone and music streaming not available through resound hearing aids, but can use ngoc      _______________________________  Rosenda Tavarez, [facility]-A  Audiologist

## 2019-06-18 NOTE — PROGRESS NOTES
"  Physical Therapy Daily Treatment Note     Name: Arben Steinernes  Clinic Number: [id number]    Therapy Diagnosis:   Encounter Diagnoses   Name Primary?    Acute pain of right knee     Joint stiffness of knee, right      Physician: Jamel Li NP    Visit Date: 5/7/2019    Physician Orders: PT Eval and Treat   Medical Diagnosis from Referral: M25.561 (ICD-10-CM) - Right knee pain, unspecified chronicity   Evaluation Date: 2/26/2019  Authorization Period Expiration: 2/11/2020  Plan of Care Expiration: 5/24/2019  Visit # / Visits authorized: 9/20    Time In: 1:00 PM  Time Out: 2:00 PM  Total Billable Time: 60 minutes (1:1 with PT 30 min)    Precautions: Standard    Subjective     Pt reports: a little discomfort to anterior R knee today, no pain to posterior knee recently. She says posterior tightness has been feeling better, and overall about knee pain says "it comes and it goes."   She was compliant with home exercise program.  Response to previous treatment: some relief with dry needling  Functional change: no change    Pain: 2/10  Location: right knee      Objective     Arben received therapeutic exercises to develop strength, endurance, ROM, flexibility and core stabilization for 45 minutes including:  SLR flex 3# 2 x 10  SLR abd 3# 2 x 10  SLR ext 3# 2 x 10  +prone quad stretch 3 x 30"  Bridges 2 x 15  Clam with GTB 2 x 15  [place] with YTB 2 x 15  Seated HS stretch 3 x 30"  Eccentric heel raises 2 x 15  GS SB 3 x 30"      Arben received the following manual therapy techniques:    00 min x Application of TDN: Pt educated on benefits and potential side effects of dry needling. Educated pt on benefits, precautions, side effects following TDN. Educated pt to use heat following treatment sessions if pt is experiencing pain or soreness. Pt verbalized good understanding of education.  Pt signed written consent to dry needling. Pt gave verbal consent for DN    Pt received dry needling to the below listed muscles " using 40 mm needles.  R knee 9 point OA protocol with E-stim applied through 3 channels at 2 Hz and mA to tolerance.     00 min x preparation and application of Kineseotape. Ystrip for patellar tracking, I strip for decompression and pain relief. Patient received education regarding appropriate care and removal of Kinesiotape. Patient instructed in proper removal techniques if skin irritation occurs.      10 min x IASTM to distal HS and proximal gastroc, and quad with rolling pin      Home Exercises Provided and Patient Education Provided     Education provided:   - use of rolling pin for home    Written Home Exercises Provided: Patient instructed to cont prior HEP.  Exercises were reviewed and Arben was able to demonstrate them prior to the end of the session.  Arben demonstrated good  understanding of the education provided.     See EMR under Patient Instructions for exercises provided 2/26/2019.    Assessment     Good tolerance to all therex today. Pt reports relief and demonstrates improved motion following STM with rolling pin.  Arben is progressing well towards her goals.   Pt prognosis is Good.     Pt will continue to benefit from skilled outpatient physical therapy to address the deficits listed in the problem list box on initial evaluation, provide pt/family education and to maximize pt's level of independence in the home and community environment.     Pt's spiritual, cultural and educational needs considered and pt agreeable to plan of care and goals.     Anticipated barriers to physical therapy: none    Goals: IE 2/26/2019  Short Term Goals (4 weeks)  1. Pt will demonstrate improvements in B knee strength >/=4/5 for ease with stair climbing. Progressing, not met  2. Pt will report <2/10 pain within the R knee for ease with ADL's. Met 4/10/19     Long Term Goals (8 weeks)  1. Pt will demonstrate >/=4+/5 strength within B LE for ease with house hold chores and climbing stairs. Progressing, not met  2. Pt will  report being independent with her HEP for maintenance of improvements gained during therapy sessions. Progressing, not met  3. Pt will report <1/10 pain within the R knee for ease with ADLs. Progressing, not met  4. Pt will demonstrate ambulation x 300 ft without AD or antalgic gait to improve functional mobility in community. Met 5/7/2019      Plan     Continue plan of care with focus on improving B LE strengthening. Continue dry needling as needed to reduce pain.  Plan of care Certification: 2/26/2019 to 5/24/2019.     Outpatient Physical Therapy 2 times weekly for 12 weeks to include the following interventions: Gait Training, Manual Therapy, Moist Heat/ Ice, Patient Education, Therapeutic Exercise and Dry Needling.      Kadie Yancey, PT    1.69

## 2019-06-24 ENCOUNTER — PATIENT MESSAGE (OUTPATIENT)
Dept: OTOLARYNGOLOGY | Facility: CLINIC | Age: 64
End: 2019-06-24

## 2019-06-30 ENCOUNTER — PATIENT MESSAGE (OUTPATIENT)
Dept: OTOLARYNGOLOGY | Facility: CLINIC | Age: 64
End: 2019-06-30

## 2019-07-09 ENCOUNTER — OFFICE VISIT (OUTPATIENT)
Dept: OTOLARYNGOLOGY | Facility: CLINIC | Age: 64
End: 2019-07-09
Payer: MEDICARE

## 2019-07-09 ENCOUNTER — HOSPITAL ENCOUNTER (OUTPATIENT)
Dept: RADIOLOGY | Facility: OTHER | Age: 64
Discharge: HOME | End: 2019-07-09
Attending: SPECIALIST
Payer: MEDICARE

## 2019-07-09 VITALS
HEART RATE: 76 BPM | SYSTOLIC BLOOD PRESSURE: 97 MMHG | DIASTOLIC BLOOD PRESSURE: 68 MMHG | TEMPERATURE: 98 F | WEIGHT: 172.63 LBS | BODY MASS INDEX: 24.71 KG/M2 | HEIGHT: 70 IN

## 2019-07-09 DIAGNOSIS — J30.9 ALLERGIC RHINITIS, UNSPECIFIED SEASONALITY, UNSPECIFIED TRIGGER: Primary | ICD-10-CM

## 2019-07-09 DIAGNOSIS — Z91.09 ALLERGY TO POLLEN: ICD-10-CM

## 2019-07-09 DIAGNOSIS — H93.13 TINNITUS OF BOTH EARS: ICD-10-CM

## 2019-07-09 DIAGNOSIS — H90.3 SENSORINEURAL HEARING LOSS (SNHL) OF BOTH EARS: ICD-10-CM

## 2019-07-09 DIAGNOSIS — R05.3 CHRONIC COUGH: ICD-10-CM

## 2019-07-09 DIAGNOSIS — J30.89 ALLERGY TO DUST: ICD-10-CM

## 2019-07-09 DIAGNOSIS — Z91.048 ALLERGY TO MOLD: ICD-10-CM

## 2019-07-09 DIAGNOSIS — J30.81 ALLERGY TO ANIMAL DANDER: ICD-10-CM

## 2019-07-09 DIAGNOSIS — J34.2 NASAL SEPTAL DEVIATION: ICD-10-CM

## 2019-07-09 DIAGNOSIS — Z91.018 FOOD ALLERGY: ICD-10-CM

## 2019-07-09 PROCEDURE — 95004 PERQ TESTS W/ALRGNC XTRCS: CPT | Mod: S$GLB,,, | Performed by: SPECIALIST

## 2019-07-09 PROCEDURE — 96372 PR INJECTION,THERAP/PROPH/DIAG2ST, IM OR SUBCUT: ICD-10-PCS | Mod: S$GLB,,, | Performed by: SPECIALIST

## 2019-07-09 PROCEDURE — 71046 X-RAY EXAM CHEST 2 VIEWS: CPT | Mod: TC,FY

## 2019-07-09 PROCEDURE — 99214 PR OFFICE/OUTPT VISIT, EST, LEVL IV, 30-39 MIN: ICD-10-PCS | Mod: 25,S$GLB,, | Performed by: SPECIALIST

## 2019-07-09 PROCEDURE — 99214 OFFICE O/P EST MOD 30 MIN: CPT | Mod: 25,S$GLB,, | Performed by: SPECIALIST

## 2019-07-09 PROCEDURE — 96372 THER/PROPH/DIAG INJ SC/IM: CPT | Mod: S$GLB,,, | Performed by: SPECIALIST

## 2019-07-09 PROCEDURE — 71046 XR CHEST PA AND LATERAL: ICD-10-PCS | Mod: 26,,, | Performed by: RADIOLOGY

## 2019-07-09 PROCEDURE — 95004 PR ALLERGY SKIN TESTS,ALLERGENS: ICD-10-PCS | Mod: 59,S$GLB,, | Performed by: SPECIALIST

## 2019-07-09 PROCEDURE — 71046 X-RAY EXAM CHEST 2 VIEWS: CPT | Mod: 26,,, | Performed by: RADIOLOGY

## 2019-07-09 RX ORDER — EPINEPHRINE 0.3 MG/.3ML
1 INJECTION SUBCUTANEOUS ONCE
Qty: 1 DEVICE | Refills: 5 | Status: SHIPPED | OUTPATIENT
Start: 2019-07-09 | End: 2022-04-19

## 2019-07-09 RX ORDER — CYANOCOBALAMIN 1000 UG/ML
1000 INJECTION, SOLUTION INTRAMUSCULAR; SUBCUTANEOUS ONCE
Status: COMPLETED | OUTPATIENT
Start: 2019-07-09 | End: 2019-07-09

## 2019-07-09 RX ORDER — BETAMETHASONE SODIUM PHOSPHATE AND BETAMETHASONE ACETATE 3; 3 MG/ML; MG/ML
6 INJECTION, SUSPENSION INTRA-ARTICULAR; INTRALESIONAL; INTRAMUSCULAR; SOFT TISSUE ONCE
Status: COMPLETED | OUTPATIENT
Start: 2019-07-09 | End: 2019-07-09

## 2019-07-09 RX ADMIN — BETAMETHASONE SODIUM PHOSPHATE AND BETAMETHASONE ACETATE 6 MG: 3; 3 INJECTION, SUSPENSION INTRA-ARTICULAR; INTRALESIONAL; INTRAMUSCULAR; SOFT TISSUE at 11:07

## 2019-07-09 RX ADMIN — CYANOCOBALAMIN 1000 MCG: 1000 INJECTION, SOLUTION INTRAMUSCULAR; SUBCUTANEOUS at 11:07

## 2019-07-09 NOTE — PATIENT INSTRUCTIONS
Food Allergy  The best way to deal with food allergies is to avoid the foods you are allergic to. Understand and be aware of the foods that you have reacted to. Also be cautious of foods or dishes that may have flavorings or small amounts of foods that you are allergic to.  Symptoms of food allergy may begin within minutes, but can start 2 hours after eating or later. Common symptoms can include:  · Nausea  · Vomiting  · Diarrhea or stomach cramps  · Iitchy rash (hives)  · Swelling of the eyes, lips, face or tongue  · Wheezing  · Difficulty breathing or swallowing  · Throat tightness  · Dizziness or fainting  This kind of allergic reaction, called anaphylaxis, can be life-threatening. In mild and moderate cases the symptoms usually begin improving within 6 to 24 hours. People with certain health problems, such as asthma and eczema, may be more likely to have food allergies. Foods that people are most commonly allergic to are milk or dairy products, eggs, peanuts, tree nuts, soy, shellfish, and wheat. Remember that any food can cause a reaction. Treatment for a severe allergic reaction can include epinephrine. If you have a severe food allergy, or have had severe allergic reactions even if you don't know the cause, you should carry this medicine with you for self-injection. It is available by prescription. It is also available in a lower dose form for children from your healthcare provider.  Home care  The following guidelines will help you care for yourself at home:  · If your symptoms were moderate to severe, they may fluctuate for the next 24 hours. It may be best to rest at home during that time.  · Avoid tobacco and alcohol because they can make symptoms worse. They can also interact with the medicines you are taking to treat the allergic reaction.  · If you know what foods caused your reaction today, avoid them in the future. The next and each reaction after this may make your body more sensitive to these  "foods. This can cause a worse reaction later. Tell your family members, friends, and doctors about your food allergy, especially in an emergency situation since they need to know how to give you epinephrine if you are unable to. This can be life-saving.  · Learn how to read food labels so you can check for the substance that you reacted to. If a food does not have a label, it is best to avoid it. When in restaurants, ask about ingredients and tell the staff, "If I eat a dish containing (food you are allergic to), I could have a severe allergic reaction."  · If your reaction was severe, get a medical alert bracelet or necklace that notes your allergy.  · If epinephrine is prescribed, carry it with you at all times. Learn how to use the device. If you begin to feel the symptoms of another reaction, use the epinephrine to inject yourself right away, and call 911. Dont wait until symptoms become severe.  · Oral allergy medicines (diphenhydramine) are antihistamines that can help with the reaction. You can buy them at any pharmacy or supermarket. They come in liquids, pills, or capsules. Unless your doctor gave you a prescription antihistamine, you can use these medicines to ease itching. Allergy medicines can make you sleepy, so be careful, especially when driving or working. For this reason, you may want to use lower doses during the day and save the higher doses for bedtime. Don't use diphenhydramine if you have glaucoma or if you are a man with trouble urinating because of an enlarged prostate.  · If allergy medicines with diphenhydramine make you too sleepy, talk with your healthcare provider. He or she can recommend an over-the counter antihistamine that won't make you sleepy. These may not work as well, though.  Follow-up care  Follow up with your healthcare provider if your symptoms don't get better over the next 2 to 3 days. If you don't know what caused this reaction, your provider may order skin tests and " blood tests, or an elimination diet. You can find an allergy specialist in your area by contacting:  · American Academy of Allergy, Asthma & Immunology, www.aaaai.org  · American College of Allergy, Asthma & Immunology, www.acaai.org  When to seek medical advice  [facility] right away if any of these occur:  · Your symptoms get worse  · New or worse swelling in the face, eyelids, lips, mouth, throat, or tongue  · Mild trouble swallowing, breathing, or wheezing  · Fever of 100.4°F (38.0°C) or higher, or as directed by your health care provider  · Severe abdominal pain  · Persistent vomiting (unable to keep liquids down) or constant diarrhea  · Blood or mucus in the stool  Call 911  If any of these occur, give yourself injectable epinephrine and call 911:  · Significant trouble breathing, talking, or swallowing  · Any change in level of alertness or unconsciousness, including dizziness, weakness, or fainting  · Cool, moist skin  · Fast, weak hearbeat  · Severe wheezing  · Hives  · Severe swelling of the face, tongue, or lips  · Drooling  · Vomiting that happens soon after eating a food you think you are allergic to  · Explosive diarrhea  Date Last Reviewed: 1/11/2016  © 4163-7285 [employer]. [address] [place] [address], [place] [address]. All rights reserved. This information is not intended as a substitute for professional medical care. Always follow your healthcare professional's instructions.        Controlling Allergens: Pollen     Keep windows closed, especially when pollen counts are high, and use air conditioning instead.   Constant exposure to allergens means constant allergy symptoms. Thats why it's important to control or avoid the allergens that cause your symptoms. If you are allergic to pollen, the tips below may help. The more you do to keep from allergens, the better youll feel.  Pollen allergy  The pollen that causes allergies is usually not the pollen carried from  plant to plant by insects such as butterflies and bees. The types of pollen that most commonly cause allergies are made by plants (trees, grasses, and weeds) that do not have flowers. These plants make small, light, dry pollen granules that are blown from plant to plant by the wind.  Controlling pollen  Below are some tips to help you limit your exposure to pollen:  · Check pollen counts and avoid spending a lot of time outdoors when counts are high. Pollen counts tend to be higher during warm, dry weather. They also tend to be higher during early morning and late afternoon hours. In some areas, daily pollen counts are reported in the paper and on the radio.  · After spending time outdoors, bathe or shower, wash your hair, and change your clothes.  · Stay indoors as much as you can on windy days.  · Keep windows closed and air conditioning on, if possible, in your car and your home.  · Have someone else do gardening, yard work, or other outdoor chores. Masks are available if you have to spend time outdoors.  · When your allergies are at their worst each year, try getting away to a place where your allergies wont bother you as much. This might be a time to try to plan a vacation or visit a friend or relative.  · Talk with your healthcare provider about medicines that can help. And, whether or not you might benefit from seeing an allergist.  Pollen allergy is seasonal  People have allergies only when the pollen to which they are allergic is in the air. Each plant pollinates more or less the same from year to year. Exactly when a plant starts to pollinate seems to depend on geographical location--rather than on the weather.   Date Last Reviewed: 9/1/2016  © 2902-6011 [employer]. [address] [place] [address]. All rights reserved. This information is not intended as a substitute for professional medical care. Always follow your healthcare professional's instructions.        Controlling  Allergens: Pets  Constant exposure to allergens means constant allergy symptoms. Thats why controlling or avoiding the allergens that cause your symptoms is an important part of your treatment. If you are allergic to pets, the tips below may help lessen your exposure.     If fur or feathers cause allergies, a fish can be a good pet choice.   Pet allergies  Many people think that pet allergy is caused by the fur of cats and dogs. But researchers have found that the major allergens are proteins made by oil glands in the animals skin. These proteins are shed in flakes of skin called dander. Allergy-causing proteins in saliva stick to the fur when the animal cleans itself. And urine contains allergy-causing proteins. Cats tend to be more likely than dogs to cause allergic reactions--this may be because they lick themselves more, may be held more, and may spend more time indoors. Guinea pigs, mice, and rats can also cause allergies.  Controlling animal allergens  The best way to avoid animal allergens is to not have a pet. If you already have a pet and want to keep it, try to reduce your exposure as much as possible. These tips may help:  · Whenever possible, keep pets outdoors.  This doesn't keep pet dander from getting into your home on clothing or shoes.  · Never let pets into your bedroom or on your bed.  · Try to keep pets off sofas, chairs, rugs, and carpeting. Also, consider removing carpets.  · Use an air-cleaning unit with a HEPA filter--especially in the bedroom.  · Use filter bags or vacuums designed to lessen allergens.  · Wash your hands after you touch a pet, and try to keep pets away from your face.  · Brush and bathe your pet often. Bathing pets helps lessen dander. Bathing also washes other allergens like dust, mold, and pollen off the animals fur.  Date Last Reviewed: 9/1/2016  © 4465-1206 [employer]. [address] [place] [address], [place] [address]. All rights reserved. This information is  not intended as a substitute for professional medical care. Always follow your healthcare professional's instructions.        Controlling Allergens: Mold  Constant exposure to allergens means constant allergy symptoms. That's why controlling or avoiding the allergens that cause your symptoms is an important part of your treatment. If you are allergic to mold, the tips below may help lessen your exposure.     Keep damp areas clean to help prevent mold from growing.   Mold allergy  Mold allergy is actually from the spores that the molds release. Spores are microscopic seed particles that travel through the air. Molds grow best in dark, damp places. Outside, mold grows on rotting logs, wet leaves, as well as on certain grasses and weeds. In the home, mold commonly grows in or on the following:  · Damp basements and closets  · Bathrooms (especially shower stalls)  · Places where fresh food is stored  · Refrigerator drip trays  · House plants  · Air conditioners  · Humidifiers  · Garbage cans or waste baskets  · Mattresses  · Upholstered furniture  · Old foam rubber pillows  Controlling mold  Try the following:  · Watch the newspaper or local news for mold counts. When they are high, stay inside as much as possible.  · Drain wet areas of your yard, and clean up leaves and weeds before they begin to rot. If you compost, keep compost piles away from the house. Ask someone else to do these things for you.  · If you are outside when mold counts are high, bathe, wash your hair, and change your clothes afterwards.  · Use products with bleach to clean the shower or tub. Also clean the shower curtain.  · Fix leaky faucets or leaks in the roof right away.  · While bathing or showering, leave a window open or use a fan.  · If your house is damp, use a dehumidifier. Empty once a day.  Date Last Reviewed: 9/1/2016  © 4856-5237 [employer]. [address] [place] [address], [place] [address]. All rights reserved. This information  is not intended as a substitute for professional medical care. Always follow your healthcare professional's instructions.        Controlling Allergens: In the Home  Even a clean home can be full of allergens, so take a moment to see what you can do to cut down on allergens in each room of your home. Try to avoid things like cigarette smoke and perfume. They can irritate your eyes, nose, throat, and lungs and make your allergies worse.  · Buy an air purifier with a HEPA filter. Look in consumer magazines for recommendations. Avoid vaporizers and humidifiers, since they encourage mold and dust-mite growth.  · Use shades or vertical blinds instead of horizontal blinds, which collect dust. Replace drapes with curtains that can be washed regularly.  · Enclose mattresses, box springs, and pillows in allergy-proof casings. Use washable blankets and quilts. Avoid feather pillows, down comforters, and wool blankets.  · Avoid dust-catching clutter. Have enclosed places to keep books, toys, and clothes. Keep closet doors closed.  · Use washable throw rugs wherever possible, or have bare floors.  · Put filters over forced-air heating vents. Change the filters regularly.  · Keep your car clean. Vacuum the seats and carpets regularly. If you have air conditioning, use it instead of opening the windows.  · Keep rain gutters clean. Remove leaves and debris that can grow mold.  · Check stored food for spoilage and mold growth. Clean up spills right away.  · Don't let wet clothing sit and grow mold. And don't hang clothes outside to dry where they can collect airborne pollen. Dry clothing immediately in a clothes dryer that's vented to the outside.  · Install a fan to keep the bathroom well ventilated.        Avoid yard work and pulling weeds. These and other outdoor activities increase your exposure to pollen. If thats not possible, wear a filter mask. When youre done, bathe, wash your hair, and change your clothes.      Date Last  Reviewed: 9/1/2016  © 6542-7381 [employer]. [address] [place] [address], [place] [address]. All rights reserved. This information is not intended as a substitute for professional medical care. Always follow your healthcare professional's instructions.        Controlling Allergens: Dust Mites  Constant exposure to allergens means constant allergy symptoms. Thats why controlling or avoiding the allergens that cause your symptoms is an important part of your treatment. If you are allergic to dust mites, the tips below can help to lessen your exposure to dust mites.     Wash all bedding in hot water.   Dust mite allergy  Dust mites are a common cause of nasal allergies. These mites are tiny organisms that live in bedding, upholstered furniture, and carpet. They live in warm, humid conditions. House-dust mites are almost impossible to get rid of. But you can keep them under control.  Make changes to your home  Some furniture, like sofas and chairs, hold dust mites. To lessen the problem:  · Choose nonfabric upholstery, like leather or vinyl.  · Replace horizontal blinds with pull-down shades or vertical blinds.  · Use washable curtains instead of heavy drapes.  · Have as little carpeting as possible.  · Cover your mattress, box spring, and pillows in allergy-proof casings.  Housecleaning  Here are some tips:  · Wash sheets, blankets, and mattress pads every 1 to 2 weeks in hot water (at least 130°F).  · Remove stuffed animals and other things that collect dust, such as wall hangings, knickknacks, and books--especially in the bedroom.  · Dust your home every week with a damp cloth. Vacuum once a week. Use HEPA (high efficiency particulate air) filters or double-ply bags in the vacuum . Or, use a vacuum designed to lessen allergens.  · If someone else cant dust and vacuum for you, wearing a filter mask may help.  Reduce indoor humidity  Dust mites need moist air to live. Use a dehumidifier to reduce air  moisture. Dont use humidifiers, or vaporizers.  Talk with your healthcare provider about other ways to reduce dust in your home. Ask about medicines that can help with your allergy symptoms.  Date Last Reviewed: 9/1/2016  © 1477-3717 [employer]. [address] [place] [address], [place] [address]. All rights reserved. This information is not intended as a substitute for professional medical care. Always follow your healthcare professional's instructions.

## 2019-07-09 NOTE — PROGRESS NOTES
Subjective:       Patient ID: Arben De Luna is a 64 y.o. female.    Chief Complaint: Allergy Test    The patient is coming in for a follow-up visit.  She continues to have episodes of lightheadedness when getting out of bed or into bed.  She is scheduled for a VNG tomorrow.  She is having some congestion and postnasal drip with coughing increase in fatigue.  She has been using Flonase on a daily basis.  She takes Xyzal for urticaria control.  She does have a significant history for motion sickness.  She continues to have chronic coughing with some mild shortness of breath.  This is most bothersome to her.  She does not have fever.  She is not producing any sputum.    Review of Systems   Constitutional: Positive for fatigue. Negative for activity change, appetite change, chills, fever and unexpected weight change.   HENT: Positive for congestion, ear pain, hearing loss (Wears bilateral hearing aids), postnasal drip, rhinorrhea, sinus pressure, sinus pain, sore throat and tinnitus. Negative for ear discharge, facial swelling, mouth sores, sneezing, trouble swallowing and voice change.    Eyes: Negative for photophobia, pain, discharge, redness, itching and visual disturbance.   Respiratory: Negative for apnea, cough, choking, shortness of breath and wheezing.    Cardiovascular: Negative for chest pain and palpitations.   Gastrointestinal: Positive for nausea and vomiting. Negative for abdominal distention and abdominal pain.   Musculoskeletal: Negative for arthralgias, myalgias, neck pain and neck stiffness.   Skin: Negative.  Negative for color change, pallor and rash.   Allergic/Immunologic: Positive for environmental allergies. Negative for food allergies and immunocompromised state.   Neurological: Positive for headaches. Negative for dizziness, facial asymmetry, speech difficulty, weakness, light-headedness and numbness.   Hematological: Negative for adenopathy. Does not bruise/bleed easily.    Psychiatric/Behavioral: Negative for confusion, decreased concentration and sleep disturbance.       Objective:      Physical Exam   Constitutional: She is oriented to person, place, and time. She appears well-developed and well-nourished. She is cooperative.   HENT:   Head: Normocephalic.   Right Ear: External ear and ear canal normal. Tympanic membrane is retracted. Tympanic membrane mobility is abnormal.   Left Ear: External ear and ear canal normal. Tympanic membrane is retracted. Tympanic membrane mobility is abnormal.   Nose: Mucosal edema (cyanotic, boggy inferior turbinates bilaterally), rhinorrhea (clear mucus bilaterally) and septal deviation (To the right) present.   Mouth/Throat: Uvula is midline, oropharynx is clear and moist and mucous membranes are normal. No oral lesions.   Eyes: Pupils are equal, round, and reactive to light. EOM and lids are normal. Right eye exhibits no discharge and no exudate. Left eye exhibits no discharge and no exudate. Right conjunctiva is injected. Left conjunctiva is injected.   Neck: Trachea normal and normal range of motion. No muscular tenderness present. No tracheal deviation present. No thyroid mass and no thyromegaly present.   Cardiovascular: Normal rate, regular rhythm, normal heart sounds and normal pulses.   Pulmonary/Chest: Effort normal and breath sounds normal. No stridor. She has no decreased breath sounds. She has no wheezes. She has no rhonchi. She has no rales.   Abdominal: Soft. Bowel sounds are normal. There is no tenderness.   Musculoskeletal: Normal range of motion.   Lymphadenopathy:        Head (right side): No submental, no submandibular, no preauricular, no posterior auricular and no occipital adenopathy present.        Head (left side): No submental, no submandibular, no preauricular, no posterior auricular and no occipital adenopathy present.     She has no cervical adenopathy.   Neurological: She is alert and oriented to person, place, and  time. She has normal strength. No cranial nerve deficit or sensory deficit. Gait normal.   Neuro otologic:  No nystatin miss, cranial nerves intact no focal or cerebellar signs, mildly wide-based gait, Romberg negative, tandem Romberg falls to the right   Skin: Skin is warm and dry. No petechiae and no rash noted. No cyanosis. Nails show no clubbing.   Psychiatric: She has a normal mood and affect. Her speech is normal and behavior is normal. Judgment and thought content normal. Cognition and memory are normal.       Percutaneous Allergy Test Results:  Environmentals-7 /11 positive  Molds- 9/19 positive  Weeds- 10/13 positive  Grasses- 8/10 positive  Trees- 12/19 positive  Foods- 9/37 positive    4+-histamine    Assessment:       1. Allergic rhinitis, unspecified seasonality, unspecified trigger    2. Nasal septal deviation    3. Sensorineural hearing loss (SNHL) of both ears    4. Tinnitus of both ears    5. Allergy to dust    6. Allergy to mold    7. Allergy to pollen    8. Allergy to animal dander    9. Food allergy        Plan:        I will send the patient for a chest x-ray today.  I am ordering a pulmonary consult to rule out asthma.  I will place her on a Ventolin inhaler for p.r.n. use as a therapeutic trial.  She will resume all of her allergy medications.  I have described in detail the home food challenge test.  She will perform such testing using only 1 food daily with a 5 day withdrawal.  Proceeding for all foods which she normally consumes.  I have provided her with an EpiPen prescription to use in the event of significant food reaction.  I have given her handouts on environmental control of molds, dust, animal dander, pollen and for household management.  I have also given her handout on food allergies.  We did discuss immunotherapy risks and benefits.  At this point she would prefer to defer that if possible.  I will recheck her in 4 weeks.    Addendum:  Chest x-ray results normal

## 2019-07-17 ENCOUNTER — PATIENT OUTREACH (OUTPATIENT)
Dept: ADMINISTRATIVE | Facility: OTHER | Age: 64
End: 2019-07-17

## 2019-07-21 ENCOUNTER — PATIENT MESSAGE (OUTPATIENT)
Dept: INTERNAL MEDICINE | Facility: CLINIC | Age: 64
End: 2019-07-21

## 2019-07-21 DIAGNOSIS — R05.3 CHRONIC COUGH: Primary | ICD-10-CM

## 2019-07-22 ENCOUNTER — CLINICAL SUPPORT (OUTPATIENT)
Dept: OTOLARYNGOLOGY | Facility: CLINIC | Age: 64
End: 2019-07-22

## 2019-07-22 ENCOUNTER — PATIENT MESSAGE (OUTPATIENT)
Dept: INTERNAL MEDICINE | Facility: CLINIC | Age: 64
End: 2019-07-22

## 2019-07-22 DIAGNOSIS — Z46.1 ENCOUNTER FOR FITTING AND ADJUSTMENT OF HEARING AID OF BOTH EARS: Primary | ICD-10-CM

## 2019-07-22 PROCEDURE — 99499 UNLISTED E&M SERVICE: CPT | Mod: S$GLB,,, | Performed by: AUDIOLOGIST-HEARING AID FITTER

## 2019-07-22 PROCEDURE — 99499 NO LOS: ICD-10-PCS | Mod: S$GLB,,, | Performed by: AUDIOLOGIST-HEARING AID FITTER

## 2019-07-22 NOTE — PROGRESS NOTES
Rosenda Tavarez, CCC-A  Audiologist - [facility] [address] [place] [address]  [email or web address]  [phone]    Patient: Arben De Luna   MRN: [id number]  [address]  Home Phone [phone]   Work Phone Not on file.   Mobile [phone]   : 1955  OLIVAS: 2019      HEARING AID FOLLOW-UP      Arben De Luna reports she is still not sure if her hearing aids are the right decision at this time - has expense of house renovations weighing on her mind.  May need to return hearing aids at this point and give it some more time.  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      Connected to review datalog and saved without changes.    Arben De Luna will give it some more thought and call or send message with decision to keep or return hearing aids.        _______________________________  Rosenda Tavarez, JOANIE-A  Audiologist

## 2019-07-22 NOTE — TELEPHONE ENCOUNTER
Reviewed chart   - agree with pulmonology consult for now   - will order CT chest and pulmonary function tests to be completed prior to pulmonology appt - please arrange both     - may consider allergy referral in future but start with above for now

## 2019-07-22 NOTE — TELEPHONE ENCOUNTER
Ms De Luna this is Dr. To's recommendation:  - agree with pulmonology consult for now   - will order CT chest and pulmonary function tests to be completed prior to pulmonology appt - please arrange both. I need day and time to schedule this for you     - may consider allergy referral in future but start with above for now

## 2019-07-23 ENCOUNTER — PATIENT MESSAGE (OUTPATIENT)
Dept: OTOLARYNGOLOGY | Facility: CLINIC | Age: 64
End: 2019-07-23

## 2019-07-24 ENCOUNTER — TELEPHONE (OUTPATIENT)
Dept: OTOLARYNGOLOGY | Facility: CLINIC | Age: 64
End: 2019-07-24

## 2019-07-24 ENCOUNTER — HOSPITAL ENCOUNTER (OUTPATIENT)
Dept: RADIOLOGY | Facility: HOSPITAL | Age: 64
Discharge: HOME | End: 2019-07-24
Attending: INTERNAL MEDICINE
Payer: MEDICARE

## 2019-07-24 ENCOUNTER — HOSPITAL ENCOUNTER (OUTPATIENT)
Dept: RESPIRATORY THERAPY | Facility: HOSPITAL | Age: 64
Discharge: HOME | End: 2019-07-24
Attending: INTERNAL MEDICINE
Payer: MEDICARE

## 2019-07-24 ENCOUNTER — TELEPHONE (OUTPATIENT)
Dept: INTERNAL MEDICINE | Facility: CLINIC | Age: 64
End: 2019-07-24

## 2019-07-24 ENCOUNTER — PATIENT MESSAGE (OUTPATIENT)
Dept: OTOLARYNGOLOGY | Facility: CLINIC | Age: 64
End: 2019-07-24

## 2019-07-24 VITALS — OXYGEN SATURATION: 96 % | HEART RATE: 78 BPM | RESPIRATION RATE: 18 BRPM

## 2019-07-24 DIAGNOSIS — R05.3 CHRONIC COUGH: ICD-10-CM

## 2019-07-24 DIAGNOSIS — R93.89 ABNORMAL CT SCAN: Primary | ICD-10-CM

## 2019-07-24 LAB
BRPFT: ABNORMAL
DLCO ADJ PRE: 21.36 ML/(MIN*MMHG) (ref 19.66–31.13)
DLCO SINGLE BREATH LLN: 19.66
DLCO SINGLE BREATH PRE REF: 84.1 %
DLCO SINGLE BREATH REF: 25.39
DLCOC SBVA LLN: 3.1
DLCOC SBVA PRE REF: 85.7 %
DLCOC SBVA REF: 4.31
DLCOC SINGLE BREATH LLN: 19.66
DLCOC SINGLE BREATH PRE REF: 84.1 %
DLCOC SINGLE BREATH REF: 25.39
DLCOVA LLN: 3.1
DLCOVA PRE REF: 85.7 %
DLCOVA PRE: 3.69 ML/(MIN*MMHG*L) (ref 3.1–5.52)
DLCOVA REF: 4.31
DLVAADJ PRE: 3.69 ML/(MIN*MMHG*L) (ref 3.1–5.52)
ERV LLN: 0.77
ERV REF: 0.77
ERVN2 LLN: 0.77
ERVN2 PRE REF: 114.1 %
ERVN2 PRE: 0.88 L (ref 0.77–0.77)
ERVN2 REF: 0.77
FEF 25 75 CHG: 7.5 %
FEF 25 75 LLN: 1.09
FEF 25 75 POST REF: 166.8 %
FEF 25 75 PRE REF: 155.2 %
FEF 25 75 REF: 2.26
FET100 CHG: -8.4 %
FEV1 CHG: -0.1 %
FEV1 FVC CHG: -0.3 %
FEV1 FVC LLN: 65
FEV1 FVC POST REF: 109.5 %
FEV1 FVC PRE REF: 109.8 %
FEV1 FVC REF: 78
FEV1 LLN: 2.05
FEV1 POST REF: 117.3 %
FEV1 PRE REF: 117.4 %
FEV1 REF: 2.77
FRCN2 LLN: 2.21
FRCN2 PRE REF: 102 %
FRCN2 REF: 3.03
FRCPLETH LLN: 2.21
FRCPLETH REF: 3.03
FVC CHG: 0.2 %
FVC LLN: 2.66
FVC POST REF: 106 %
FVC PRE REF: 105.8 %
FVC REF: 3.59
IVC PRE: 3.93 L (ref 2.66–4.53)
IVC SINGLE BREATH LLN: 2.66
IVC SINGLE BREATH PRE REF: 109.3 %
IVC SINGLE BREATH REF: 3.59
MVV LLN: 93
MVV REF: 110
PEF CHG: 15.5 %
PEF LLN: 4.81
PEF POST REF: 122.2 %
PEF PRE REF: 105.8 %
PEF REF: 6.84
POST FEF 25 75: 3.77 L/S (ref 1.09–3.44)
POST FET 100: 7.79 SEC
POST FEV1 FVC: 85.28 % (ref 65.17–90.54)
POST FEV1: 3.25 L (ref 2.05–3.49)
POST FVC: 3.81 L (ref 2.66–4.53)
POST PEF: 8.36 L/S (ref 4.81–8.87)
PRE DLCO: 21.36 ML/(MIN*MMHG) (ref 19.66–31.13)
PRE FEF 25 75: 3.51 L/S (ref 1.09–3.44)
PRE FET 100: 8.5 SEC
PRE FEV1 FVC: 85.52 % (ref 65.17–90.54)
PRE FEV1: 3.25 L (ref 2.05–3.49)
PRE FRC N2: 3.09 L
PRE FVC: 3.8 L (ref 2.66–4.53)
PRE PEF: 7.24 L/S (ref 4.81–8.87)
RAW LLN: 3.06
RAW REF: 3.06
RV LLN: 1.68
RV REF: 2.26
RVN2 LLN: 1.68
RVN2 PRE REF: 97.8 %
RVN2 PRE: 2.21 L (ref 1.68–2.84)
RVN2 REF: 2.26
RVN2TLCN2 LLN: 31.81
RVN2TLCN2 PRE REF: 87.7 %
RVN2TLCN2 PRE: 36.29 % (ref 31.81–50.99)
RVN2TLCN2 REF: 41.4
RVTLC LLN: 32
RVTLC REF: 41
TLC LLN: 4.91
TLC REF: 5.89
TLCN2 LLN: 4.91
TLCN2 PRE REF: 103.4 %
TLCN2 PRE: 6.09 L (ref 4.91–6.88)
TLCN2 REF: 5.89
VA PRE: 5.79 L (ref 5.74–5.74)
VA SINGLE BREATH LLN: 5.74
VA SINGLE BREATH PRE REF: 100.8 %
VA SINGLE BREATH REF: 5.74
VC LLN: 2.66
VC REF: 3.59
VCMAXN2 LLN: 2.66
VCMAXN2 PRE REF: 108 %
VCMAXN2 PRE: 3.88 L (ref 2.66–4.53)
VCMAXN2 REF: 3.59

## 2019-07-24 PROCEDURE — 71250 CT CHEST WITHOUT CONTRAST: ICD-10-PCS | Mod: 26,,, | Performed by: RADIOLOGY

## 2019-07-24 PROCEDURE — 94060 PR EVAL OF BRONCHOSPASM: ICD-10-PCS | Mod: 26,,, | Performed by: INTERNAL MEDICINE

## 2019-07-24 PROCEDURE — 71250 CT THORAX DX C-: CPT | Mod: 26,,, | Performed by: RADIOLOGY

## 2019-07-24 PROCEDURE — 25000242 PHARM REV CODE 250 ALT 637 W/ HCPCS: Performed by: INTERNAL MEDICINE

## 2019-07-24 PROCEDURE — 94729 PR C02/MEMBANE DIFFUSE CAPACITY: ICD-10-PCS | Mod: 26,,, | Performed by: INTERNAL MEDICINE

## 2019-07-24 PROCEDURE — 71250 CT THORAX DX C-: CPT | Mod: TC

## 2019-07-24 PROCEDURE — 94727 GAS DIL/WSHOT DETER LNG VOL: CPT | Mod: 26,,, | Performed by: INTERNAL MEDICINE

## 2019-07-24 PROCEDURE — 94060 EVALUATION OF WHEEZING: CPT | Mod: 26,,, | Performed by: INTERNAL MEDICINE

## 2019-07-24 PROCEDURE — 94729 DIFFUSING CAPACITY: CPT | Mod: 26,,, | Performed by: INTERNAL MEDICINE

## 2019-07-24 PROCEDURE — 94727 PR PULM FUNCTION TEST BY GAS: ICD-10-PCS | Mod: 26,,, | Performed by: INTERNAL MEDICINE

## 2019-07-24 RX ORDER — AZITHROMYCIN 250 MG/1
TABLET, FILM COATED ORAL
Qty: 6 TABLET | Refills: 0 | Status: SHIPPED | OUTPATIENT
Start: 2019-07-24 | End: 2019-07-29

## 2019-07-24 RX ORDER — ALBUTEROL SULFATE 2.5 MG/.5ML
2.5 SOLUTION RESPIRATORY (INHALATION) ONCE
Status: COMPLETED | OUTPATIENT
Start: 2019-07-24 | End: 2019-07-24

## 2019-07-24 RX ADMIN — ALBUTEROL SULFATE 2.5 MG: 2.5 SOLUTION RESPIRATORY (INHALATION) at 02:07

## 2019-07-24 NOTE — TELEPHONE ENCOUNTER
Called and reviewed PFTs and CT chest with pt - irreg opacities and still with cough - given amoxicillin in May - no other abx prior to or after this   - no fevers  - will give azithromycin to start taking now   - er and rtc prompts given    - renal cyst seen on CT - will get renal US to view both kidneys and further eval     She has scheduled pulm appt in August which she will keep     - please arrange renal us in 1-2 weeks

## 2019-07-25 ENCOUNTER — HOSPITAL ENCOUNTER (OUTPATIENT)
Dept: RADIOLOGY | Facility: OTHER | Age: 64
Discharge: HOME | End: 2019-07-25
Attending: INTERNAL MEDICINE
Payer: MEDICARE

## 2019-07-25 ENCOUNTER — TELEPHONE (OUTPATIENT)
Dept: INTERNAL MEDICINE | Facility: CLINIC | Age: 64
End: 2019-07-25

## 2019-07-25 ENCOUNTER — PATIENT MESSAGE (OUTPATIENT)
Dept: PULMONOLOGY | Facility: CLINIC | Age: 64
End: 2019-07-25

## 2019-07-25 DIAGNOSIS — R93.89 ABNORMAL CT SCAN: ICD-10-CM

## 2019-07-25 PROCEDURE — 76770 US EXAM ABDO BACK WALL COMP: CPT | Mod: TC

## 2019-07-25 PROCEDURE — 76770 US EXAM ABDO BACK WALL COMP: CPT | Mod: 26,,, | Performed by: SPECIALIST

## 2019-07-25 PROCEDURE — 76770 US RETROPERITONEAL COMPLETE: ICD-10-PCS | Mod: 26,,, | Performed by: SPECIALIST

## 2019-07-25 NOTE — TELEPHONE ENCOUNTER
Call placed to patient to schedule Kidney Ultrasound per MD's order. Appointment scheduled on 7/25/19.

## 2019-07-25 NOTE — TELEPHONE ENCOUNTER
----- Message from Cristin Luther sent at 7/25/2019 10:03 AM CDT -----  Contact: Pt   Type:  Patient Returning Call    Who Called: SIDDHARTH LAZO [[phone]]    Who Left Message for Patient: [place]     Does the patient know what this is regarding?:Yes     Best Call Back Number:[phone]    Additional Information:

## 2019-07-25 NOTE — TELEPHONE ENCOUNTER
Called pt and LVM stating that the staff was calling to schedule renal US as rec by PCP   Left office number for pt to call and schedule

## 2019-07-29 ENCOUNTER — OFFICE VISIT (OUTPATIENT)
Dept: PULMONOLOGY | Facility: CLINIC | Age: 64
End: 2019-07-29
Payer: MEDICARE

## 2019-07-29 VITALS
WEIGHT: 174.63 LBS | SYSTOLIC BLOOD PRESSURE: 102 MMHG | DIASTOLIC BLOOD PRESSURE: 63 MMHG | BODY MASS INDEX: 25 KG/M2 | HEART RATE: 73 BPM | HEIGHT: 70 IN | OXYGEN SATURATION: 95 %

## 2019-07-29 DIAGNOSIS — J47.9 BRONCHIECTASIS WITHOUT COMPLICATION: ICD-10-CM

## 2019-07-29 DIAGNOSIS — R91.8 GROUND GLASS OPACITY PRESENT ON IMAGING OF LUNG: ICD-10-CM

## 2019-07-29 DIAGNOSIS — J30.9 ALLERGIC RHINITIS, UNSPECIFIED SEASONALITY, UNSPECIFIED TRIGGER: ICD-10-CM

## 2019-07-29 DIAGNOSIS — R05.9 COUGH: ICD-10-CM

## 2019-07-29 PROCEDURE — 99214 PR OFFICE/OUTPT VISIT, EST, LEVL IV, 30-39 MIN: ICD-10-PCS | Mod: S$PBB,ICN,, | Performed by: NURSE PRACTITIONER

## 2019-07-29 PROCEDURE — 99999 PR PBB SHADOW E&M-EST. PATIENT-LVL III: CPT | Mod: PBBFAC,,, | Performed by: NURSE PRACTITIONER

## 2019-07-29 PROCEDURE — 99213 OFFICE O/P EST LOW 20 MIN: CPT | Mod: PBBFAC | Performed by: NURSE PRACTITIONER

## 2019-07-29 PROCEDURE — 99999 PR PBB SHADOW E&M-EST. PATIENT-LVL III: ICD-10-PCS | Mod: PBBFAC,,, | Performed by: NURSE PRACTITIONER

## 2019-07-29 PROCEDURE — 99214 OFFICE O/P EST MOD 30 MIN: CPT | Mod: S$PBB,ICN,, | Performed by: NURSE PRACTITIONER

## 2019-07-29 RX ORDER — MONTELUKAST SODIUM 10 MG/1
10 TABLET ORAL NIGHTLY
Qty: 30 TABLET | Refills: 0 | Status: SHIPPED | OUTPATIENT
Start: 2019-07-29 | End: 2019-07-29 | Stop reason: SDUPTHER

## 2019-07-29 RX ORDER — MONTELUKAST SODIUM 10 MG/1
TABLET ORAL
Qty: 90 TABLET | Refills: 3 | Status: SHIPPED | OUTPATIENT
Start: 2019-07-29 | End: 2020-08-19

## 2019-07-29 RX ORDER — ALBUTEROL SULFATE 90 UG/1
2 AEROSOL, METERED RESPIRATORY (INHALATION) EVERY 6 HOURS PRN
Qty: 18 G | Refills: 2 | Status: SHIPPED | OUTPATIENT
Start: 2019-07-29 | End: 2020-10-08

## 2019-07-29 NOTE — PROGRESS NOTES
"Subjective:       Patient ID: Arben De Luna is a 64 y.o. female.    Chief Complaint: Cough  HPI:   Arben De Luna is a 64 y.o. female who presents with a cough that has been ongoing since March.  She is referred to me by Dr. To.  She reports that when her cough began she was not having any URI type symptoms.  She was seen by ENT who recommended that she use Astelin and Flonase.  She has not been using this for some time.   She recently  had azithromycin from Dr. To  And it may have helped a bit.   Former smoker, quit 40 years ago smoked 1/4 pack a day.   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   Cough is less productive over time, especially since last abx.  Sometimes feels like she has "something" in her lung when she breaths, perhaps a "rattling"  Had a lot of fatigue, felt a bit better prior to starting last ABX  Took Zyxal for a couple of days for urticaria but is not currently taking it.     Review of Systems   Constitutional: Negative for chills, activity change, fatigue and night sweats.   HENT: Positive for congestion. Negative for postnasal drip, rhinorrhea and trouble swallowing.         "ears totally clogged"   Eyes: Negative for itching.   Respiratory: Positive for cough. Negative for hemoptysis, sputum production, choking, chest tightness, shortness of breath, wheezing, dyspnea on extertion and use of rescue inhaler.    Cardiovascular: Negative for chest pain and palpitations.   Genitourinary: Negative for difficulty urinating.   Endocrine: Negative for cold intolerance and heat intolerance.    Musculoskeletal: Negative for arthralgias.   Skin: Negative for rash.   Gastrointestinal: Negative for nausea, vomiting and acid reflux.   Neurological: Positive for dizziness. Negative for light-headedness.   Hematological: Negative for adenopathy.   Psychiatric/Behavioral: Negative for sleep disturbance.         Social History     Tobacco " Use    Smoking status: Former Smoker     Packs/day: 0.25     Years: 4.00     Pack years: 1.00    Smokeless tobacco: Never Used    Tobacco comment: tob: in high school and quit early college   Substance Use Topics    Alcohol use: Yes     Frequency: Monthly or less     Comment: 1-2 glasses of wine per evening       Review of patient's allergies indicates:  No Known Allergies  Past Medical History:   Diagnosis Date    Actinic keratoses     Arthritis     right knee     Cherry angioma     Depression     History of colonic polyps - adenomatous polyps     1 tub adenoma on cscope 2018    Osteopenia     Plantar fasciitis     Seborrheic keratoses     Squamous cell carcinoma in situ     right arm     No past surgical history on file.  Current Outpatient Medications on File Prior to Visit   Medication Sig    azelastine (ASTELIN) 137 mcg (0.1 %) nasal spray Two sprays in each nostril, sniff until absorbed, then follow with 1 spray of fluticasone.  Use both sprays twice daily.    azithromycin (Z-RIMA) 250 MG tablet Take 2 tablets by mouth on day 1; Take 1 tablet by mouth on days 2-5    EPINEPHrine (EPIPEN 2-RIMA) 0.3 mg/0.3 mL AtIn Inject 0.3 mLs (0.3 mg total) into the muscle once. May repeat does once in 20 min if needed the for 1 dose    venlafaxine (EFFEXOR-XR) 150 MG Cp24 Take 1 capsule (150 mg total) by mouth once daily.     No current facility-administered medications on file prior to visit.        Objective:      There were no vitals filed for this visit.  Physical Exam   Constitutional: She is oriented to person, place, and time. She appears well-developed and well-nourished. No distress.   HENT:   Head: Normocephalic.   Neck: Normal range of motion. Neck supple.   Cardiovascular: Normal rate and regular rhythm.   No murmur heard.  Pulmonary/Chest: Normal expansion, symmetric chest wall expansion, effort normal and breath sounds normal. No respiratory distress. She has no decreased breath sounds. She has  no wheezes. She has no rhonchi. She has no rales.   Abdominal: Soft. She exhibits no distension. There is no hepatosplenomegaly. There is no tenderness.   Musculoskeletal: Normal range of motion. She exhibits no edema.   Lymphadenopathy:     She has no cervical adenopathy.   Neurological: She is alert and oriented to person, place, and time. Gait normal.   Skin: Skin is warm and dry. No cyanosis. Nails show no clubbing.   Psychiatric: She has a normal mood and affect.   Vitals reviewed.    Personal Diagnostic Review    PFTs personally reviewed and discussed with patient  Imaging personally reviewed with patient 7/24/2019          Assessment:     Problem List Items Addressed This Visit        Pulmonary    Cough    Overview     Ongoing since March 2019. Followed by ENT. Completing abx          Current Assessment & Plan     Suspect that her cough may be due to her allergies.  She has trialed many interventions but none of them in tandem.  Recommend that she trial the plan as detailed in the AVS (saline rinses, salt water gargles, Delsym and Xyzal). Albuterol PRN cough.          Ground glass opacity present on imaging of lung    Overview     Noted on CT scan 7/24/2019         Current Assessment & Plan     Concern that GGO/patchy infiltrate was infectious.  Her report that her symptoms improved after initiating abx therapy may support this. If symptoms return will repeat imaging.           Bronchiectasis    Overview     Noted on CT 7/24/2019         Current Assessment & Plan     Noted on CT- as it was mild I don't suspect that this is contributing to her cough.              Other    Allergic rhinitis    Overview     Followed by ENT         Current Assessment & Plan     See entry for cough

## 2019-07-29 NOTE — LETTER
July 30, 2019      Joanie To MD  [address]  [facility] [address]           [facility]  [address]  [facility] [address]  Phone: [phone]          Patient: Arben De Luna   MR Number: [id number]   YOB: 1955   Date of Visit: 7/29/2019       Dear Dr. Joanie To:    Thank you for referring Arben De Luna to me for evaluation. Attached you will find relevant portions of my assessment and plan of care.    If you have questions, please do not hesitate to call me. I look forward to following Arben De Luna along with you.    Sincerely,    Michelle Sheppard, DNP    Enclosure  CC:  No Recipients    If you would like to receive this communication electronically, please contact [email or web address] or [phone] to request more information on [employer] Link access.    For providers and/or their staff who would like to refer a patient to [facility], please contact us through our one-stop-shop provider referral line, [facility] , at [phone].    If you feel you have received this communication in error or would no longer like to receive these types of communications, please e-mail [email or web address]

## 2019-07-29 NOTE — PATIENT INSTRUCTIONS
Nasal Saline:     A. Tilt head back and squirt into nostril 2-3 times until you taste saline in back of throat. Spit, and blow nose. Do this 4 times, alternating right and left nostril. Do this routine 2-3 times per day- at least once in the shower.    Gargle with warm salt water 2-3 times per day. About 1 cup, fairly warm, fairly salty    Xyzal 5mg at night    Flonase 2 sprays each nostril daily    Delsym cough syrup twice a day    Continue all of these things for 2-3 weeks    Albuterol inhaler as needed for cough    Call or email me in 2 weeks and let me know how you are doing.

## 2019-07-30 ENCOUNTER — PATIENT MESSAGE (OUTPATIENT)
Dept: INTERNAL MEDICINE | Facility: CLINIC | Age: 64
End: 2019-07-30

## 2019-07-30 ENCOUNTER — PATIENT MESSAGE (OUTPATIENT)
Dept: OTOLARYNGOLOGY | Facility: CLINIC | Age: 64
End: 2019-07-30

## 2019-07-30 PROBLEM — R91.8 GROUND GLASS OPACITY PRESENT ON IMAGING OF LUNG: Status: ACTIVE | Noted: 2019-07-30

## 2019-07-30 PROBLEM — R05.9 COUGH: Status: ACTIVE | Noted: 2019-07-30

## 2019-07-30 PROBLEM — J47.9 BRONCHIECTASIS: Status: ACTIVE | Noted: 2019-07-30

## 2019-07-30 NOTE — ASSESSMENT & PLAN NOTE
Concern that GGO/patchy infiltrate was infectious.  Her report that her symptoms improved after initiating abx therapy may support this. If symptoms return will repeat imaging.

## 2019-07-30 NOTE — ASSESSMENT & PLAN NOTE
Suspect that her cough may be due to her allergies.  She has trialed many interventions but none of them in tandem.  Recommend that she trial the plan as detailed in the AVS (saline rinses, salt water gargles, Delsym and Xyzal). Albuterol PRN cough.

## 2019-08-01 ENCOUNTER — PATIENT MESSAGE (OUTPATIENT)
Dept: INTERNAL MEDICINE | Facility: CLINIC | Age: 64
End: 2019-08-01

## 2019-09-11 DIAGNOSIS — F33.42 RECURRENT MAJOR DEPRESSIVE DISORDER, IN FULL REMISSION: Primary | ICD-10-CM

## 2019-09-11 RX ORDER — VENLAFAXINE HYDROCHLORIDE 150 MG/1
150 CAPSULE, EXTENDED RELEASE ORAL DAILY
Qty: 90 CAPSULE | Refills: 1 | Status: SHIPPED | OUTPATIENT
Start: 2019-09-11 | End: 2020-03-08 | Stop reason: SDUPTHER

## 2019-09-14 ENCOUNTER — PATIENT MESSAGE (OUTPATIENT)
Dept: PULMONOLOGY | Facility: CLINIC | Age: 64
End: 2019-09-14

## 2019-11-12 ENCOUNTER — PATIENT OUTREACH (OUTPATIENT)
Dept: ADMINISTRATIVE | Facility: OTHER | Age: 64
End: 2019-11-12

## 2019-11-14 ENCOUNTER — OFFICE VISIT (OUTPATIENT)
Dept: DERMATOLOGY | Facility: CLINIC | Age: 64
End: 2019-11-14
Payer: MEDICARE

## 2019-11-14 DIAGNOSIS — L82.1 SEBORRHEIC KERATOSIS: ICD-10-CM

## 2019-11-14 DIAGNOSIS — D48.5 NEOPLASM OF UNCERTAIN BEHAVIOR OF SKIN: Primary | ICD-10-CM

## 2019-11-14 DIAGNOSIS — L90.5 SCAR: ICD-10-CM

## 2019-11-14 DIAGNOSIS — L57.0 ACTINIC KERATOSIS: ICD-10-CM

## 2019-11-14 DIAGNOSIS — L81.4 LENTIGINES: ICD-10-CM

## 2019-11-14 DIAGNOSIS — D18.01 ANGIOMA OF SKIN: ICD-10-CM

## 2019-11-14 DIAGNOSIS — Z85.828 HISTORY OF NONMELANOMA SKIN CANCER: ICD-10-CM

## 2019-11-14 PROCEDURE — 88305 TISSUE EXAM BY PATHOLOGIST: ICD-10-PCS | Mod: 26,,, | Performed by: DERMATOLOGY

## 2019-11-14 PROCEDURE — 88305 TISSUE EXAM BY PATHOLOGIST: CPT | Performed by: DERMATOLOGY

## 2019-11-14 PROCEDURE — 17000 PR DESTRUCTION(LASER SURGERY,CRYOSURGERY,CHEMOSURGERY),PREMALIGNANT LESIONS,FIRST LESION: ICD-10-PCS | Mod: 59,S$GLB,, | Performed by: DERMATOLOGY

## 2019-11-14 PROCEDURE — 11102 PR TANGENTIAL BIOPSY, SKIN, SINGLE LESION: ICD-10-PCS | Mod: S$GLB,,, | Performed by: DERMATOLOGY

## 2019-11-14 PROCEDURE — 88305 TISSUE EXAM BY PATHOLOGIST: CPT | Mod: 26,,, | Performed by: DERMATOLOGY

## 2019-11-14 PROCEDURE — 99203 PR OFFICE/OUTPT VISIT, NEW, LEVL III, 30-44 MIN: ICD-10-PCS | Mod: 25,S$GLB,, | Performed by: DERMATOLOGY

## 2019-11-14 PROCEDURE — 99203 OFFICE O/P NEW LOW 30 MIN: CPT | Mod: 25,S$GLB,, | Performed by: DERMATOLOGY

## 2019-11-14 PROCEDURE — 17000 DESTRUCT PREMALG LESION: CPT | Mod: 59,S$GLB,, | Performed by: DERMATOLOGY

## 2019-11-14 PROCEDURE — 11102 TANGNTL BX SKIN SINGLE LES: CPT | Mod: S$GLB,,, | Performed by: DERMATOLOGY

## 2019-11-14 PROCEDURE — 11103 TANGNTL BX SKIN EA SEP/ADDL: CPT | Mod: S$GLB,,, | Performed by: DERMATOLOGY

## 2019-11-14 PROCEDURE — 17003 DESTRUCT PREMALG LES 2-14: CPT | Mod: S$GLB,,, | Performed by: DERMATOLOGY

## 2019-11-14 PROCEDURE — 17003 DESTRUCTION, PREMALIGNANT LESIONS; SECOND THROUGH 14 LESIONS: ICD-10-PCS | Mod: S$GLB,,, | Performed by: DERMATOLOGY

## 2019-11-14 PROCEDURE — 11103 PR TANGENTIAL BIOPSY, SKIN, EA ADDTL LESION: ICD-10-PCS | Mod: S$GLB,,, | Performed by: DERMATOLOGY

## 2019-11-14 NOTE — PROGRESS NOTES
Subjective:       Patient ID:  Arbne De Luna is a 64 y.o. female who presents for   Chief Complaint   Patient presents with    Spot     L forearm     This is a high risk patient with a history of skin cancer who is here today to check for the development of new lesions.    Spot  - Initial  Affected locations: left arm and currently clear  Duration: 1 year  Signs / symptoms: redness  Severity: mild  Timing: intermittent  Aggravated by: nothing  Relieving factors/Treatments tried: nothing      Review of Systems   Musculoskeletal: Negative for joint swelling and arthralgias.   Skin: Positive for daily sunscreen use, activity-related sunscreen use and wears hat. Negative for recent sunburn.   Hematologic/Lymphatic: Does not bruise/bleed easily.        Objective:    Physical Exam   Constitutional: She appears well-developed and well-nourished. No distress.   HENT:   Mouth/Throat: Lips normal.    Eyes: Lids are normal.  No conjunctival no injection.   Neurological: She is alert and oriented to person, place, and time. She is not disoriented.   Psychiatric: She has a normal mood and affect.   Skin:   Areas Examined (abnormalities noted in diagram):   Scalp / Hair Palpated and Inspected  Head / Face Inspection Performed  Neck Inspection Performed  Chest / Axilla Inspection Performed  Abdomen Inspection Performed  Genitals / Buttocks / Groin Inspection Performed  Back Inspection Performed  RUE Inspected  LUE Inspection Performed  RLE Inspected  LLE Inspection Performed  Nails and Digits Inspection Performed                   Diagram Legend     Erythematous scaling macule/papule c/w actinic keratosis       Vascular papule c/w angioma      Pigmented verrucoid papule/plaque c/w seborrheic keratosis      Yellow umbilicated papule c/w sebaceous hyperplasia      Irregularly shaped tan macule c/w lentigo     1-2 mm smooth white papules consistent with Milia      Movable subcutaneous cyst with punctum c/w epidermal inclusion cyst       Subcutaneous movable cyst c/w pilar cyst      Firm pink to brown papule c/w dermatofibroma      Pedunculated fleshy papule(s) c/w skin tag(s)      Evenly pigmented macule c/w junctional nevus     Mildly variegated pigmented, slightly irregular-bordered macule c/w mildly atypical nevus      Flesh colored to evenly pigmented papule c/w intradermal nevus       Pink pearly papule/plaque c/w basal cell carcinoma      Erythematous hyperkeratotic cursted plaque c/w SCC      Surgical scar with no sign of skin cancer recurrence      Open and closed comedones      Inflammatory papules and pustules      Verrucoid papule consistent consistent with wart     Erythematous eczematous patches and plaques     Dystrophic onycholytic nail with subungual debris c/w onychomycosis     Umbilicated papule    Erythematous-base heme-crusted tan verrucoid plaque consistent with inflamed seborrheic keratosis     Erythematous Silvery Scaling Plaque c/w Psoriasis     See annotation      Assessment / Plan:      Pathology Orders:     Normal Orders This Visit    Specimen to Pathology, Dermatology     Comments:    Number of Specimens:->2  ------------------------->-------------------------  Spec 1 Procedure:->Biopsy  Spec 1 Clinical Impression:->r/o BCC  Spec 1 Source:->left upper back  ------------------------->-------------------------  Spec 2 Procedure:->Biopsy  Spec 2 Clinical Impression:->r/o BCC  Spec 2 Source:->left nasolabial fold    Questions:    Procedure Type:  Dermatology and skin neoplasms    Number of Specimens:  2    ------------------------:  -------------------------    Spec 1 Procedure:  Biopsy    Spec 1 Clinical Impression:  r/o BCC    Spec 1 Source:  left upper back    ------------------------:  -------------------------    Spec 2 Procedure:  Biopsy    Spec 2 Clinical Impression:  r/o BCC    Spec 2 Source:  left nasolabial fold        Neoplasm of uncertain behavior of skin  -     Specimen to Pathology, Dermatology  Shave  biopsy procedure note x 2:  Risk, benefits, and alternatives of biopsy are discussed with the patient, including risk of infection, scar, recurrence, and need for additional treatment of site. The patient agrees to the procedure by verbal consent. The area is marked and prepped with alcohol.  Approximately 1 mL of lidocaine 1% with epinephrine is used for local anesthesia. A sharp blade is used to remove a portion of the lesion. The specimen is sent for pathology. Hemostasis is obtained with aluminum chloride and/or monopolar hyfrecation if needed. The area is then dressed and bandaged. The patient tolerated the procedure well without adverse event. Written instructions on wound care were given and were reviewed with the patient, who is to call for any signs of bleeding or infection. The patient will be notified of the pathology results.    Actinic keratosis  Cryosurgery procedure note:  Risk, benefits, and alternatives of cryosurgery are discussed with the patient, including but not limited to the risks of hypopigmentation, hyperpigmentation, scar, infection, recurrence of lesion(s), development of new lesion(s), and need for additional treatment of the lesion(s). Verbal consent obtained from patient. Liquid nitrogen cryosurgery applied to 7 lesion(s) to produce a freeze injury. Counseled patient that blisters may form, and instructed patient on wound care with gentle cleansing and use of Vaseline ointment to keep moist until healed. Handout was provided, and patient was instructed to return to clinic in 1-2 months if lesions do not completely resolve.    Seborrheic keratosis  These are benign, inherited growths without a malignant potential. Reassurance given to patient. No treatment is necessary. Handout was provided.    Lentigines  These are benign sun spots which should be monitored for changes. Daily sun protection will reduce the number of new lesions.   Patient instructed in importance of daily  broad-spectrum sunscreen use with SPF of at least 30. Sun avoidance and topical protection/protective clothing discussed.    Angioma of skin  This is a benign vascular lesion. Reassurance given. No treatment required.    Scar  History of nonmelanoma skin cancer  Area(s) of previous nonmelanoma skin cancer evaluated with no signs of recurrence. Reassurance provided.      Follow up in about 6 months (around 5/14/2020) for TBSE, or sooner dependent on pathology results.

## 2019-11-16 DIAGNOSIS — M25.561 RIGHT KNEE PAIN, UNSPECIFIED CHRONICITY: ICD-10-CM

## 2019-11-21 ENCOUNTER — TELEPHONE (OUTPATIENT)
Dept: DERMATOLOGY | Facility: CLINIC | Age: 64
End: 2019-11-21

## 2019-11-21 LAB
FINAL PATHOLOGIC DIAGNOSIS: NORMAL
GROSS: NORMAL
MICROSCOPIC EXAM: NORMAL

## 2019-11-21 NOTE — TELEPHONE ENCOUNTER
----- Message from Heydi Leroy MD sent at 11/21/2019  4:42 PM CST -----  Please let patient know that the path results showed 2 BCCs which require surgical excision to remove it in its entirety. Please schedule procedure for excision of BCC on back and review pre- and post-op instructions with her.  Will refer to mohs for BCC on face.  KK    Final Pathologic Diagnosis   1. Skin, left upper back, shave biopsy:   -BASAL CELL CARCINOMA, SUPERFICIAL, MULTIFOCAL, EXTENDING TO THE BASE OF THE   SPECIMEN   2.  Skin, left nasolabial fold, shave biopsy:   -BASAL CELL CARCINOMA, NODULAR TYPE, EXTENDING TO THE DEEP AND PERIPHERAL   MARGINS

## 2019-11-21 NOTE — PROGRESS NOTES
Please call the patient to schedule for Mohs surgery.  Thank you,  Dr. Leroy    Skin, left nasolabial fold, shave biopsy:   -BASAL CELL CARCINOMA, NODULAR TYPE, EXTENDING TO THE DEEP AND PERIPHERAL   MARGINS

## 2019-11-22 ENCOUNTER — TELEPHONE (OUTPATIENT)
Dept: DERMATOLOGY | Facility: CLINIC | Age: 64
End: 2019-11-22

## 2019-11-22 NOTE — TELEPHONE ENCOUNTER
Called to inform pt of bx results from Dr. Leroy on left nasolabial fold. No answer on phone # provided. Left voicemail with my name/#  for pt to give office a call back when she could.

## 2019-11-25 ENCOUNTER — TELEPHONE (OUTPATIENT)
Dept: DERMATOLOGY | Facility: CLINIC | Age: 64
End: 2019-11-25

## 2019-11-25 NOTE — PROGRESS NOTES
Rosenda Tavarez, CCC-A  Audiologist - [facility] [address] [place] [address]  [email or web address]  [phone]    Patient: Arben De Luna   MRN: [id number]  [address]  Home Phone [phone]   Work Phone Not on file.   Mobile [phone]   : 1955  OLIVAS: 2019      HEARING AID CONSULT      Hearing aid prices and styles were discussed with Arben De Luna at length.  She would like to order a hearing aid for both ears.  A hearing aid contract was reviewed and signed.     _______________________________  Rosenda Tavarez, JOANIE-A  Audiologist

## 2019-11-25 NOTE — TELEPHONE ENCOUNTER
Called pt again to again to give Bx results and set up appt. No answer. Left another voicemail for pt to call back.

## 2019-11-26 ENCOUNTER — PATIENT MESSAGE (OUTPATIENT)
Dept: DERMATOLOGY | Facility: CLINIC | Age: 64
End: 2019-11-26

## 2019-11-27 ENCOUNTER — TELEPHONE (OUTPATIENT)
Dept: DERMATOLOGY | Facility: CLINIC | Age: 64
End: 2019-11-27

## 2019-11-27 NOTE — TELEPHONE ENCOUNTER
Pt called because she knew that we had left numerous messages for her. Pt was already informed of her Bx results from Dr. Leroy's office. Pt stated that she was familiar with the Mohs procx and wanted to be scheduled as soon as she could get in. Pt confirmed date/time of procx. Pt was also very  happy to know that Dr. Valle is a woman! She thanked us for trying to get I contact with her.

## 2019-12-01 ENCOUNTER — PATIENT MESSAGE (OUTPATIENT)
Dept: DERMATOLOGY | Facility: CLINIC | Age: 64
End: 2019-12-01

## 2019-12-13 ENCOUNTER — PATIENT OUTREACH (OUTPATIENT)
Dept: ADMINISTRATIVE | Facility: OTHER | Age: 64
End: 2019-12-13

## 2019-12-16 ENCOUNTER — PROCEDURE VISIT (OUTPATIENT)
Dept: DERMATOLOGY | Facility: CLINIC | Age: 64
End: 2019-12-16
Payer: MEDICARE

## 2019-12-16 VITALS
BODY MASS INDEX: 24.91 KG/M2 | DIASTOLIC BLOOD PRESSURE: 73 MMHG | HEART RATE: 63 BPM | SYSTOLIC BLOOD PRESSURE: 135 MMHG | HEIGHT: 70 IN | WEIGHT: 174 LBS

## 2019-12-16 DIAGNOSIS — C44.311 BASAL CELL CARCINOMA OF NASOLABIAL CREASE: Primary | ICD-10-CM

## 2019-12-16 PROCEDURE — 17311 MOHS 1 STAGE H/N/HF/G: CPT | Mod: PBBFAC | Performed by: DERMATOLOGY

## 2019-12-16 PROCEDURE — 13131 CMPLX RPR F/C/C/M/N/AX/G/H/F: CPT | Mod: S$PBB,59,, | Performed by: DERMATOLOGY

## 2019-12-16 PROCEDURE — 17311: ICD-10-PCS | Mod: S$PBB,,, | Performed by: DERMATOLOGY

## 2019-12-16 PROCEDURE — 17311 MOHS 1 STAGE H/N/HF/G: CPT | Mod: S$PBB,,, | Performed by: DERMATOLOGY

## 2019-12-16 PROCEDURE — 13131 PR RECMPL WND HEAD,FAC,HAND 1.1-2.5 CM: ICD-10-PCS | Mod: S$PBB,59,, | Performed by: DERMATOLOGY

## 2019-12-16 PROCEDURE — 13131 CMPLX RPR F/C/C/M/N/AX/G/H/F: CPT | Mod: PBBFAC | Performed by: DERMATOLOGY

## 2019-12-16 PROCEDURE — 99499 UNLISTED E&M SERVICE: CPT | Mod: S$PBB,,, | Performed by: DERMATOLOGY

## 2019-12-16 PROCEDURE — 99499 NO LOS: ICD-10-PCS | Mod: S$PBB,,, | Performed by: DERMATOLOGY

## 2019-12-16 RX ORDER — HYDROCODONE BITARTRATE AND ACETAMINOPHEN 5; 325 MG/1; MG/1
1 TABLET ORAL EVERY 6 HOURS PRN
Qty: 10 TABLET | Refills: 0 | Status: SHIPPED | OUTPATIENT
Start: 2019-12-16 | End: 2020-10-08

## 2019-12-16 RX ORDER — CEPHALEXIN 500 MG/1
500 CAPSULE ORAL 3 TIMES DAILY
Qty: 21 CAPSULE | Refills: 0 | Status: SHIPPED | OUTPATIENT
Start: 2019-12-16 | End: 2019-12-23

## 2019-12-16 NOTE — PROGRESS NOTES
PROCEDURE: Mohs' Micrographic Surgery    INDICATION: Location in mask areas of face including central face, nose, eyelids, eyebrows, lips, chin, preauricular, temple, and ear. Biopsy-proven skin cancer of cosmetically and functionally important areas, including head, neck, genital, hand, foot, or areas known for having difficulty in healing, such as the lower anterior legs. Tumor with ill-defined borders.    REFERRING MD: Heydi Leroy M.D.    CASE NUMBER:     ANESTHETIC: 3 cc 0.5% Lidocaine with Epi 1:200,000 mixed 1:1 with 0.5% Bupivacaine    SURGICAL PREP: Hibiclens    SURGEON: Zaid Valle MD    ASSISTANTS: Andra Lubin PA-C and Abraham [facility] Tech    PREOPERATIVE DIAGNOSIS: basal cell carcinoma    POSTOPERATIVE DIAGNOSIS: basal cell carcinoma    PATHOLOGIC DIAGNOSIS: basal cell carcinoma- nodular    HISTOLOGY OF SPECIMENS IN FIRST STAGE:   Tumor Type: No tumor seen.    STAGES OF MOHS' SURGERY PERFORMED: 1    TUMOR-FREE PLANE ACHIEVED: Yes    HEMOSTASIS: electrocoagulation     SPECIMENS: 2    LOCATION: left nasolabial fold. Patient verified location with hand held mirror.    INITIAL LESION SIZE: 0.6 x 0.7 cm    FINAL DEFECT SIZE: 0.7 x 1.0 cm    WOUND REPAIR/DISPOSITION: The patient tolerated Mohs' Micrographic Surgery for a basal cell carcinoma very well. When the tumor was completely removed, a repair of the surgical defect was undertaken.      PROCEDURE: Complex Linear Repair    INDICATION: Status post Mohs' Micrographic Surgery for basal cell carcinoma.    CASE NUMBER:     SURGEON: Zaid Valle MD    ASSISTANTS: Andra Lubin PA-C and Anna Aguiar MA    ANESTHETIC: 3 cc 1% Lidocaine with Epinephrine 1:100,000    SURGICAL PREP: Hibiclens, prepped by Andra Lubin PA-C    LOCATION: left nasolabial fold    DEFECT SIZE: 0.7 x 1.0 cm    WOUND REPAIR/DISPOSITION:  After the patient's carcinoma had been completely removed with Mohs' Micrographic Surgery, a repair of the surgical defect was  "undertaken. The patient was returned to the operating suite where the area of left nasolabial fold was prepped, draped, and anesthetized in the usual sterile fashion. The wound was widely undermined in all directions. Then, electrocoagulation was used to obtain meticulous hemostasis. 4-0 Vicryl buried vertical mattress sutures were placed into the subcutaneous and dermal plane to close the wound and janessa the cutaneous wound edge. Bilateral dog ears were identified and were removed by a standard Burow's triangle technique. The cutaneous wound edges were closed using interrupted 6-0 Prolene suture.    The patient tolerated the procedure well.    The area was cleaned and dressed appropriately and the patient was given wound care instructions, as well as appointment for follow-up evaluation. Patient was placed on Norco 5-325 mg prn postop pain and Keflex 500 mg TID x 7 days.    LENGTH OF REPAIR: 2.2 cm    Vitals:    12/16/19 1232 12/16/19 1422   BP: 124/79 135/73   BP Location: Left arm    Patient Position: Sitting    BP Method: Small (Automatic)    Pulse: 69 63   Weight: 78.9 kg (174 lb)    Height: 5' 10" (1.778 m)          "

## 2019-12-17 ENCOUNTER — PATIENT MESSAGE (OUTPATIENT)
Dept: DERMATOLOGY | Facility: CLINIC | Age: 64
End: 2019-12-17

## 2019-12-19 RX ORDER — MELOXICAM 15 MG/1
TABLET ORAL
Qty: 10 TABLET | Refills: 0 | OUTPATIENT
Start: 2019-12-19

## 2019-12-23 ENCOUNTER — CLINICAL SUPPORT (OUTPATIENT)
Dept: DERMATOLOGY | Facility: CLINIC | Age: 64
End: 2019-12-23
Payer: MEDICARE

## 2019-12-23 NOTE — PROGRESS NOTES
64 y.o. female patient is here for suture removal following Mohs' surgery.    Patient reports no problems l nasolabial fold     WOUND PE:  The l nasolabial fold sutures intact. Wound healing well. Good skin edges. No signs or symptoms of infection.    IMPRESSION:  Healing operative site from Mohs' surgery BCC l nasolabial fold, s/p Mohs with CLC postop day # 7.     PLAN:  Sutures removed today. Steri-strips applied.  Discontinue wound care.  Keep moist with Aquaphor.    RTC:  In 3-6 months with Heydi Leroy MD.  for skin check or sooner if new concern arises.

## 2019-12-27 ENCOUNTER — TELEPHONE (OUTPATIENT)
Dept: INTERNAL MEDICINE | Facility: CLINIC | Age: 64
End: 2019-12-27

## 2019-12-27 ENCOUNTER — PATIENT MESSAGE (OUTPATIENT)
Dept: INTERNAL MEDICINE | Facility: CLINIC | Age: 64
End: 2019-12-27

## 2019-12-27 NOTE — TELEPHONE ENCOUNTER
Called PT and delivered BX results of BCC. Explained what BCC is and what this means for PT. Booked surgery for 1/27/2020. Went over pre/post OP instructions and what to expect after surgery. PT requested that pre/post OP instructions to be sent via Jennifer.

## 2019-12-27 NOTE — TELEPHONE ENCOUNTER
----- Message from Brittany Barreto sent at 12/27/2019 10:44 AM CST -----  Contact: Self  Type: Patient Call Back    Who called:Arben    What is the request in detail:Patient called to schedule procedure to have something removed from her back. Please call to advise    Can the clinic reply by [facility]?    Would the patient rather a call back or a response via [facility]? Call    Best call back number:[phone]

## 2019-12-30 ENCOUNTER — PATIENT MESSAGE (OUTPATIENT)
Dept: DERMATOLOGY | Facility: CLINIC | Age: 64
End: 2019-12-30

## 2020-01-27 ENCOUNTER — PROCEDURE VISIT (OUTPATIENT)
Dept: DERMATOLOGY | Facility: CLINIC | Age: 65
End: 2020-01-27
Payer: MEDICARE

## 2020-01-27 DIAGNOSIS — C44.519 BASAL CELL CARCINOMA OF UPPER BACK: Primary | ICD-10-CM

## 2020-01-27 PROCEDURE — 99499 UNLISTED E&M SERVICE: CPT | Mod: S$GLB,,, | Performed by: DERMATOLOGY

## 2020-01-27 PROCEDURE — 12032 INTMD RPR S/A/T/EXT 2.6-7.5: CPT | Mod: 59,S$GLB,, | Performed by: DERMATOLOGY

## 2020-01-27 PROCEDURE — 11602 PR EXC SKIN MALIG 1.1-2 CM TRUNK,ARM,LEG: ICD-10-PCS | Mod: S$GLB,,, | Performed by: DERMATOLOGY

## 2020-01-27 PROCEDURE — 99499 NO LOS: ICD-10-PCS | Mod: S$GLB,,, | Performed by: DERMATOLOGY

## 2020-01-27 PROCEDURE — 11602 EXC TR-EXT MAL+MARG 1.1-2 CM: CPT | Mod: S$GLB,,, | Performed by: DERMATOLOGY

## 2020-01-27 PROCEDURE — 88305 TISSUE EXAM BY PATHOLOGIST: ICD-10-PCS | Mod: 26,,, | Performed by: PATHOLOGY

## 2020-01-27 PROCEDURE — 88305 TISSUE EXAM BY PATHOLOGIST: CPT | Performed by: PATHOLOGY

## 2020-01-27 PROCEDURE — 12032 PR LAYR CLOS WND TRUNK,ARM,LEG 2.6-7.5 CM: ICD-10-PCS | Mod: 59,S$GLB,, | Performed by: DERMATOLOGY

## 2020-01-27 PROCEDURE — 88305 TISSUE EXAM BY PATHOLOGIST: CPT | Mod: 26,,, | Performed by: PATHOLOGY

## 2020-01-27 NOTE — PROGRESS NOTES
PROCEDURE NOTE    DIAGNOSIS: basal cell carcinoma    LOCATION: left upper back    ASSISTANT: Charley Stoddard LPN    ANESTHESIA:  1% Lidocaine with Epinephrine and Sodium bicarbonate, 15 mL    PROCEDURE: Risks, benefits, and alternatives are discussed with the patient, and the patient agrees to the procedure by signing an informed consent. Patient is taken to the surgery suite and assumes a comfortable position. The area to be anesthetized is cleaned with Hibiclens solution. The area is injected with local anesthesia to produce a suitable field for surgery. Cold steel scalpel excision is performed through the skin to subcutaneous tissue in a fusiform pattern so as to encompass the lesion with a 4 mm margin. The specimen of tissue is removed and sent to the pathology lab. Bleeding points are stopped with monopolar hyfrecation or tied with absorbable suture as needed throughout the procedure to maintain hemostasis.     CLOSURE: The wound is undermined in all directions to mobilize tissue and to reduce tension on the sutured wound edges.  3-0 PDS II absorbable suture is used in a layered fashion closure through dermis and subcutaneous tissue to close the wound and to janessa the cutaneous wound edge. This allows for preservation of structure and function of surrounding anatomy. The epidermis and dermis are sutured with 3-0 Ethilon in an interrupted fashion.     SIZE OF EXCISION: 1.5 x 1.2 cm    LENGTH OF REPAIR: 5.0 cm     The area was then cleaned, and a sterile pressure dressing applied. Procedure tolerated well without adverse event and negligible blood loss. The patient is discharged in stable condition.     Post op instructions: the patient is verbally (and in writing) educated on care of wound and told to call or return for bleeding, tenderness, redness, etc. Patient should use acetaminophen 1000 mg every 8 hours for pain relief if needed.     Suture removal in 14 day(s).

## 2020-01-31 LAB
FINAL PATHOLOGIC DIAGNOSIS: NORMAL
GROSS: NORMAL
MICROSCOPIC EXAM: NORMAL

## 2020-02-10 ENCOUNTER — CLINICAL SUPPORT (OUTPATIENT)
Dept: DERMATOLOGY | Facility: CLINIC | Age: 65
End: 2020-02-10
Payer: MEDICARE

## 2020-02-10 DIAGNOSIS — Z48.02 VISIT FOR SUTURE REMOVAL: Primary | ICD-10-CM

## 2020-02-10 PROCEDURE — 99024 PR POST-OP FOLLOW-UP VISIT: ICD-10-PCS | Mod: S$GLB,POP,, | Performed by: DERMATOLOGY

## 2020-02-10 PROCEDURE — 99024 POSTOP FOLLOW-UP VISIT: CPT | Mod: S$GLB,POP,, | Performed by: DERMATOLOGY

## 2020-02-10 NOTE — PROGRESS NOTES
Suture Removal note:  CC: 64 y.o. female patient is here for suture removal.         HPI: Patient is s/p excision of BCC from the left upper back on 1/27/20.  Patient reports no problems.    WOUND PE:  Sutures intact.  Wound healing well.  Good approximation of skin edges.  No signs or symptoms of infection.    IMPRESSION: -SCAR (POST-SURGICAL)  -NO RESIDUAL BASAL CELL CARCINOMA IDENTIFIED  - margins clear.    PLAN:  Sutures removed today.  Continue wound care.    RTC: In PRN months.

## 2020-03-08 DIAGNOSIS — F33.42 RECURRENT MAJOR DEPRESSIVE DISORDER, IN FULL REMISSION: ICD-10-CM

## 2020-03-09 RX ORDER — VENLAFAXINE HYDROCHLORIDE 150 MG/1
150 CAPSULE, EXTENDED RELEASE ORAL DAILY
Qty: 90 CAPSULE | Refills: 0 | Status: SHIPPED | OUTPATIENT
Start: 2020-03-09 | End: 2020-05-27 | Stop reason: SDUPTHER

## 2020-04-13 RX ORDER — LEVOCETIRIZINE DIHYDROCHLORIDE 5 MG/1
5 TABLET, FILM COATED ORAL NIGHTLY
Qty: 90 TABLET | Refills: 3 | Status: SHIPPED | OUTPATIENT
Start: 2020-04-13 | End: 2020-10-08

## 2020-05-27 DIAGNOSIS — F33.42 RECURRENT MAJOR DEPRESSIVE DISORDER, IN FULL REMISSION: ICD-10-CM

## 2020-05-27 RX ORDER — VENLAFAXINE HYDROCHLORIDE 150 MG/1
150 CAPSULE, EXTENDED RELEASE ORAL DAILY
Qty: 90 CAPSULE | Refills: 0 | Status: SHIPPED | OUTPATIENT
Start: 2020-05-27 | End: 2020-09-01 | Stop reason: SDUPTHER

## 2020-07-15 DIAGNOSIS — Z71.89 COMPLEX CARE COORDINATION: ICD-10-CM

## 2020-08-24 RX ORDER — MONTELUKAST SODIUM 10 MG/1
10 TABLET ORAL NIGHTLY
Qty: 90 TABLET | Refills: 3 | Status: SHIPPED | OUTPATIENT
Start: 2020-08-24 | End: 2020-10-08

## 2020-09-01 ENCOUNTER — PATIENT MESSAGE (OUTPATIENT)
Dept: INTERNAL MEDICINE | Facility: CLINIC | Age: 65
End: 2020-09-01

## 2020-09-01 DIAGNOSIS — F33.42 RECURRENT MAJOR DEPRESSIVE DISORDER, IN FULL REMISSION: ICD-10-CM

## 2020-09-01 RX ORDER — VENLAFAXINE HYDROCHLORIDE 150 MG/1
150 CAPSULE, EXTENDED RELEASE ORAL DAILY
Qty: 30 CAPSULE | Refills: 0 | Status: SHIPPED | OUTPATIENT
Start: 2020-09-01 | End: 2020-10-08 | Stop reason: SDUPTHER

## 2020-09-01 NOTE — TELEPHONE ENCOUNTER
Care Due:                  Date            Visit Type   Department     Provider  --------------------------------------------------------------------------------                                             [facility]  Last Visit: 10-      NEW PATIENT  Yohan To                              MYCHART                              FOLLOWUP/OF  [facility]  Next Visit: 09-      FICE VISIT   Med            Joanie To                                                            Last  Test          Frequency    Reason                     Performed    Due Date  --------------------------------------------------------------------------------    Cr..........  12 months..  venlafaxine..............  10-   10-    Powered by [employer]. Reference number: [id number]. 9/01/2020 7:51:11 AM CDT

## 2020-09-29 ENCOUNTER — TELEPHONE (OUTPATIENT)
Dept: INTERNAL MEDICINE | Facility: CLINIC | Age: 65
End: 2020-09-29

## 2020-09-29 ENCOUNTER — PATIENT MESSAGE (OUTPATIENT)
Dept: INTERNAL MEDICINE | Facility: CLINIC | Age: 65
End: 2020-09-29

## 2020-10-05 ENCOUNTER — OFFICE VISIT (OUTPATIENT)
Dept: URGENT CARE | Facility: CLINIC | Age: 65
End: 2020-10-05
Payer: MEDICARE

## 2020-10-05 VITALS
WEIGHT: 180 LBS | OXYGEN SATURATION: 96 % | TEMPERATURE: 98 F | HEART RATE: 87 BPM | DIASTOLIC BLOOD PRESSURE: 89 MMHG | RESPIRATION RATE: 18 BRPM | BODY MASS INDEX: 25.77 KG/M2 | HEIGHT: 70 IN | SYSTOLIC BLOOD PRESSURE: 137 MMHG

## 2020-10-05 DIAGNOSIS — J30.2 SEASONAL ALLERGIES: Primary | ICD-10-CM

## 2020-10-05 DIAGNOSIS — R09.82 POST-NASAL DRIP: ICD-10-CM

## 2020-10-05 PROCEDURE — 99213 PR OFFICE/OUTPT VISIT, EST, LEVL III, 20-29 MIN: ICD-10-PCS | Mod: S$GLB,,, | Performed by: NURSE PRACTITIONER

## 2020-10-05 PROCEDURE — 99213 OFFICE O/P EST LOW 20 MIN: CPT | Mod: S$GLB,,, | Performed by: NURSE PRACTITIONER

## 2020-10-05 RX ORDER — FLUTICASONE PROPIONATE 50 MCG
2 SPRAY, SUSPENSION (ML) NASAL DAILY
Qty: 15.8 ML | Refills: 0 | Status: SHIPPED | OUTPATIENT
Start: 2020-10-05 | End: 2021-12-15

## 2020-10-05 NOTE — PROGRESS NOTES
"Subjective:       Patient ID: Arben De Luna is a 65 y.o. female.    Vitals:  height is 5' 10" (1.778 m) and weight is 81.6 kg (180 lb). Her temperature is 97.9 °F (36.6 °C). Her blood pressure is 137/89 and her pulse is 87. Her respiration is 18 and oxygen saturation is 96%.     Chief Complaint: Sinus Problem    Don't want covid swab  Patient would like paper script today. States she has been feeling bad for the last week and her sore throat is getting worse.  Has been using a homeopathic throat spray    Sinus Problem  This is a new problem. The current episode started in the past 7 days (1 week). The problem is unchanged. There has been no fever. Her pain is at a severity of 0/10. She is experiencing no pain. Associated symptoms include congestion, coughing and a sore throat. Pertinent negatives include no chills, headaches or shortness of breath. Past treatments include nothing.       Constitution: Negative for chills, fatigue and fever.   HENT: Positive for congestion and sore throat.    Neck: Negative for painful lymph nodes.   Cardiovascular: Negative for chest pain and leg swelling.   Eyes: Positive for eye discharge. Negative for double vision and blurred vision.   Respiratory: Positive for cough. Negative for shortness of breath.    Gastrointestinal: Negative for nausea, vomiting and diarrhea.   Genitourinary: Negative for dysuria, frequency, urgency and history of kidney stones.   Musculoskeletal: Negative for joint pain, joint swelling, muscle cramps and muscle ache.   Skin: Negative for color change, pale, rash and bruising.   Allergic/Immunologic: Negative for seasonal allergies.   Neurological: Negative for dizziness, history of vertigo, light-headedness, passing out and headaches.   Hematologic/Lymphatic: Negative for swollen lymph nodes.   Psychiatric/Behavioral: Negative for nervous/anxious, sleep disturbance and depression. The patient is not nervous/anxious.        Objective:      Physical Exam "   Constitutional: She is oriented to person, place, and time. She appears well-developed. She is cooperative.  Non-toxic appearance. She does not appear ill. No distress.   HENT:   Head: Normocephalic and atraumatic.   Ears:   Right Ear: Hearing, tympanic membrane, external ear and ear canal normal.   Left Ear: Hearing, tympanic membrane, external ear and ear canal normal.   Nose: Mucosal edema and rhinorrhea present. No nasal deformity or congestion. No epistaxis. Right sinus exhibits no maxillary sinus tenderness and no frontal sinus tenderness. Left sinus exhibits no maxillary sinus tenderness and no frontal sinus tenderness.   Mouth/Throat: Uvula is midline and mucous membranes are normal. Mucous membranes are moist. No trismus in the jaw. Normal dentition. No uvula swelling. Posterior oropharyngeal erythema and cobblestoning present. No oropharyngeal exudate or posterior oropharyngeal edema.   Eyes: Conjunctivae and lids are normal. No scleral icterus.   Neck: Trachea normal, normal range of motion, full passive range of motion without pain and phonation normal. Neck supple. No neck rigidity. No edema and no erythema present.   Cardiovascular: Normal rate, regular rhythm, S1 normal, S2 normal, normal heart sounds and normal pulses. Exam reveals no gallop and no friction rub.   No murmur heard.  Pulmonary/Chest: Effort normal and breath sounds normal. No stridor. No respiratory distress. She has no decreased breath sounds. She has no wheezes. She has no rhonchi.   Abdominal: Normal appearance.   Musculoskeletal: Normal range of motion.         General: No deformity.   Neurological: She is alert and oriented to person, place, and time. She exhibits normal muscle tone. Coordination normal.   Skin: Skin is warm, dry, intact, not diaphoretic and not pale. Capillary refill takes less than 2 seconds. Psychiatric: Her speech is normal and behavior is normal. Judgment and thought content normal.   Nursing note and  vitals reviewed.        Assessment:       1. Seasonal allergies    2. Post-nasal drip        Plan:         Seasonal allergies  -     fluticasone propionate (FLONASE) 50 mcg/actuation nasal spray; 2 sprays (100 mcg total) by Each Nostril route once daily.  Dispense: 15.8 mL; Refill: 0    Post-nasal drip  -     fluticasone propionate (FLONASE) 50 mcg/actuation nasal spray; 2 sprays (100 mcg total) by Each Nostril route once daily.  Dispense: 15.8 mL; Refill: 0      Patient Instructions     Seasonal Allergy  Seasonal allergy is also called hay fever. It may occur after a person is exposed to pollens released from grasses, weeds, trees and shrubs. This type of allergy occurs during the spring and summer when the pollen contacts the lining of the nose, eyes, eyelids, sinuses and throat. This causes histamine to be released from the tissues. Histamine causes itching and swelling. This may produce a watery discharge from the eyes or nose. Violent sneezing, nasal congestion, post-nasal drip, itching of the eyes, nose, throat and mouth, scratchy throat, and dry cough may also occur.  Home care  Seasonal allergy cannot be cured, but symptoms can be reduced by these measures:  · Avoid or reduce exposure to the allergen as much as you can:    ¨ Stay indoors on windy days of pollen season.   ¨ Keep windows and doors closed. Use air conditioning instead in your home and car. This filters the air.  ¨ Change air conditioner filters often.  ¨ Take a shower, wash your hair, and change clothes after being outdoors.  ¨ Put on a NIOSH-rated 95 filter mask when working outdoors. Before going outside, take your allergy medicine as advised by your healthcare provider.  · Decongestant pills and sprays reduce tissue swelling and watery discharge. Overuse of nasal decongestant sprays may make symptoms worse. Do not use these more often than recommended. Sometimes you can experience a rebound effect (symptoms worsen), when stopping them. Talk  to your healthcare provider or pharmacist about these medicines before taking them, especially if you have high blood pressure or heart problems.   · Antihistamines block the release of histamine during the allergic response. They work better when taken before symptoms develop. Unless a prescription antihistamine was prescribed, you can take over-the-counter antihistamines that do not cause drowsiness.  Ask your pharmacist for suggestions.  · Steroid nasal sprays or oral steroids may also be prescribed for more severe symptoms. These help to reduce the local inflammation that can add to the allergic response.  · If you have asthma, pollen season may make your asthma symptoms worse. It is important that you use your asthma medicines as directed during this time to prevent or treat attacks. Some persons with asthma have asthma symptoms that get worse when they take antihistamines. This is due to the drying effect on the lungs. If you notice this, stop the antihistamines, drink extra fluids and notify your doctor.  · If you have sinus congestion or drainage, a saline nasal rinse may give relief. A saline nasal rinse lessens the swelling and clears excess mucus. This allows sinuses to drain. Prepackaged kits are sold at most drug stores. These contain pre-mixed salt packets and an irrigation device.  Follow-up care  Follow up with your healthcare provider or as directed. If you have been referred to a specialist, make an appointment promptly.  When to seek medical advice  Call your healthcare provider for any of the following:  · Facial, ear or sinus pain; colored drainage from the nose  · Headaches  · You have asthma and your asthma symptoms do not respond to the usual doses of your medicine  · Cough with colored sputum (mucus)  · Fever of 100.4°F (38°C) or higher, or as directed by the healthcare provider  Call 911 if any of these occur:  · Trouble breathing or swallowing, wheezing  · Hoarse voice, trouble speaking,  or drooling  · Confusion  · Very drowsy or trouble awakening  · Fainting or loss of consciousness  · Rapid heart rate, or weak pulse  · Low blood pressure  · Feeling of doom  · Nausea, vomiting, abdominal pain, diarrhea  · Vomiting blood, or large amounts of blood in stool  · Seizure  · Cold, moist, or pale (blue in color) skin  Date Last Reviewed: 5/1/2017  © 5020-2966 [employer] ([employer]). [address] [place] [address], [place] [address]. All rights reserved. This information is not intended as a substitute for professional medical care. Always follow your healthcare professional's instructions.

## 2020-10-05 NOTE — PROGRESS NOTES
"Subjective:       Patient ID: Arben De Luna is a 65 y.o. female.    Vitals:  height is 5' 10" (1.778 m) and weight is 81.6 kg (180 lb). Her temperature is 97.9 °F (36.6 °C). Her pulse is 87. Her oxygen saturation is 96%.     Chief Complaint: Sinus Problem    Don't want covid swab    Sinus Problem  This is a new problem. The current episode started in the past 7 days (1 week). The problem is unchanged. There has been no fever. Her pain is at a severity of 0/10. She is experiencing no pain. Associated symptoms include congestion, coughing and a sore throat. Pertinent negatives include no chills, headaches or shortness of breath. Past treatments include nothing.       Constitution: Negative for chills, fatigue and fever.   HENT: Positive for congestion and sore throat.    Neck: Negative for painful lymph nodes.   Cardiovascular: Negative for chest pain and leg swelling.   Eyes: Positive for eye discharge. Negative for double vision and blurred vision.   Respiratory: Positive for cough. Negative for shortness of breath.    Gastrointestinal: Negative for nausea, vomiting and diarrhea.   Genitourinary: Negative for dysuria, frequency, urgency and history of kidney stones.   Musculoskeletal: Negative for joint pain, joint swelling, muscle cramps and muscle ache.   Skin: Negative for color change, pale, rash and bruising.   Allergic/Immunologic: Negative for seasonal allergies.   Neurological: Negative for dizziness, history of vertigo, light-headedness, passing out and headaches.   Hematologic/Lymphatic: Negative for swollen lymph nodes.   Psychiatric/Behavioral: Negative for nervous/anxious, sleep disturbance and depression. The patient is not nervous/anxious.        Objective:      Physical Exam      Assessment:       No diagnosis found.    Plan:         There are no diagnoses linked to this encounter.       "

## 2020-10-05 NOTE — PATIENT INSTRUCTIONS
Seasonal Allergy  Seasonal allergy is also called hay fever. It may occur after a person is exposed to pollens released from grasses, weeds, trees and shrubs. This type of allergy occurs during the spring and summer when the pollen contacts the lining of the nose, eyes, eyelids, sinuses and throat. This causes histamine to be released from the tissues. Histamine causes itching and swelling. This may produce a watery discharge from the eyes or nose. Violent sneezing, nasal congestion, post-nasal drip, itching of the eyes, nose, throat and mouth, scratchy throat, and dry cough may also occur.  Home care  Seasonal allergy cannot be cured, but symptoms can be reduced by these measures:  · Avoid or reduce exposure to the allergen as much as you can:    ¨ Stay indoors on windy days of pollen season.   ¨ Keep windows and doors closed. Use air conditioning instead in your home and car. This filters the air.  ¨ Change air conditioner filters often.  ¨ Take a shower, wash your hair, and change clothes after being outdoors.  ¨ Put on a NIOSH-rated 95 filter mask when working outdoors. Before going outside, take your allergy medicine as advised by your healthcare provider.  · Decongestant pills and sprays reduce tissue swelling and watery discharge. Overuse of nasal decongestant sprays may make symptoms worse. Do not use these more often than recommended. Sometimes you can experience a rebound effect (symptoms worsen), when stopping them. Talk to your healthcare provider or pharmacist about these medicines before taking them, especially if you have high blood pressure or heart problems.   · Antihistamines block the release of histamine during the allergic response. They work better when taken before symptoms develop. Unless a prescription antihistamine was prescribed, you can take over-the-counter antihistamines that do not cause drowsiness.  Ask your pharmacist for suggestions.  · Steroid nasal sprays or oral steroids may  also be prescribed for more severe symptoms. These help to reduce the local inflammation that can add to the allergic response.  · If you have asthma, pollen season may make your asthma symptoms worse. It is important that you use your asthma medicines as directed during this time to prevent or treat attacks. Some persons with asthma have asthma symptoms that get worse when they take antihistamines. This is due to the drying effect on the lungs. If you notice this, stop the antihistamines, drink extra fluids and notify your doctor.  · If you have sinus congestion or drainage, a saline nasal rinse may give relief. A saline nasal rinse lessens the swelling and clears excess mucus. This allows sinuses to drain. Prepackaged kits are sold at most drug stores. These contain pre-mixed salt packets and an irrigation device.  Follow-up care  Follow up with your healthcare provider or as directed. If you have been referred to a specialist, make an appointment promptly.  When to seek medical advice  Call your healthcare provider for any of the following:  · Facial, ear or sinus pain; colored drainage from the nose  · Headaches  · You have asthma and your asthma symptoms do not respond to the usual doses of your medicine  · Cough with colored sputum (mucus)  · Fever of 100.4°F (38°C) or higher, or as directed by the healthcare provider  Call 911 if any of these occur:  · Trouble breathing or swallowing, wheezing  · Hoarse voice, trouble speaking, or drooling  · Confusion  · Very drowsy or trouble awakening  · Fainting or loss of consciousness  · Rapid heart rate, or weak pulse  · Low blood pressure  · Feeling of doom  · Nausea, vomiting, abdominal pain, diarrhea  · Vomiting blood, or large amounts of blood in stool  · Seizure  · Cold, moist, or pale (blue in color) skin  Date Last Reviewed: 5/1/2017  © 4381-3396 [employer]. [address] [place] [address], [place] [address]. All rights reserved. This information is not  intended as a substitute for professional medical care. Always follow your healthcare professional's instructions.

## 2020-10-08 ENCOUNTER — PATIENT MESSAGE (OUTPATIENT)
Dept: INTERNAL MEDICINE | Facility: CLINIC | Age: 65
End: 2020-10-08

## 2020-10-08 DIAGNOSIS — Z51.81 ENCOUNTER FOR MEDICATION MONITORING: ICD-10-CM

## 2020-10-08 DIAGNOSIS — Z23 IMMUNIZATION DUE: ICD-10-CM

## 2020-10-08 DIAGNOSIS — Z13.220 ENCOUNTER FOR LIPID SCREENING FOR CARDIOVASCULAR DISEASE: ICD-10-CM

## 2020-10-08 DIAGNOSIS — Z13.6 ENCOUNTER FOR LIPID SCREENING FOR CARDIOVASCULAR DISEASE: ICD-10-CM

## 2020-10-08 DIAGNOSIS — M85.80 OSTEOPENIA, UNSPECIFIED LOCATION: ICD-10-CM

## 2020-10-08 DIAGNOSIS — Z00.00 ANNUAL PHYSICAL EXAM: Primary | ICD-10-CM

## 2020-10-08 DIAGNOSIS — F33.42 RECURRENT MAJOR DEPRESSIVE DISORDER, IN FULL REMISSION: ICD-10-CM

## 2020-10-08 RX ORDER — VENLAFAXINE HYDROCHLORIDE 150 MG/1
150 CAPSULE, EXTENDED RELEASE ORAL DAILY
Qty: 30 CAPSULE | Refills: 0 | Status: SHIPPED | OUTPATIENT
Start: 2020-10-08 | End: 2020-10-30

## 2020-10-08 NOTE — TELEPHONE ENCOUNTER
Provider Staff:     Action is required for this patient.     Please schedule patient for the following     Labs:  - CMP     Thanks!  [facility]      Appointments past 12m or future 3m with pcp    Date Provider   Last Visit   10/12/2018 Joanie To MD   Next Visit   10/16/2020 Joanie To MD     Note composed: 10/08/2020 6:55 PM

## 2020-10-08 NOTE — TELEPHONE ENCOUNTER
Refill Routing Note   Medication(s) are not appropriate for processing by [facility] for the following reason(s):     - Required laboratory values are outdated >6 months    [facility] actions taken in this encounter: Defer    Medication-related problems identified: Requires labs  Medication Therapy Plan: CDMR: Labs out of date; Labs (CMP); FOVS  Medication reconciliation completed: No   Automatic Epic Generated Protocol Data:    Orders Placed This Encounter    Comprehensive Metabolic Panel      Requested Prescriptions   Pending Prescriptions Disp Refills    venlafaxine (EFFEXOR-XR) 150 MG Cp24 30 capsule 0     Sig: Take 1 capsule (150 mg total) by mouth once daily.       Psychiatry: Antidepressants - SNRI - desvenlafaxine & venlafaxine Failed - 10/8/2020  9:15 AM        Failed - Cr is 1.3 or below and within 360 days     Creatinine   Date Value Ref Range Status   10/17/2018 0.8 0.5 - 1.4 mg/dL Final              Failed - eGFR within 360 days     eGFR if non    Date Value Ref Range Status   10/17/2018 >60 >60 mL/min/1.73 m^2 Final     Comment:     Calculation used to obtain the estimated glomerular filtration  rate (eGFR) is the CKD-EPI equation.        eGFR if    Date Value Ref Range Status   10/17/2018 >60 >60 mL/min/1.73 m^2 Final              Passed - Patient is at least 18 years old        Passed - Last BP in normal range within 360 days     BP Readings from Last 3 Encounters:   10/05/20 137/89   12/16/19 135/73   07/29/19 102/63              Passed - Office Visit within last 12 months or future 90 days.     Recent Outpatient Visits            3 days ago Seasonal allergies    [facility] Ayana Farris NP    10 months ago Neoplasm of uncertain behavior of skin    [facility] - Dermatology Heydi Leroy MD    1 year ago Cough    [address] [facility] [address] Michelle Sheppard DNP    1 year ago Allergic rhinitis, unspecified seasonality,  unspecified trigger    [facility] [facility] [address] WILI Tirado MD    1 year ago Vertigo    [facility] [facility] [address] WILI Tirado MD          Future Appointments              In 1 week Joanie To MD [facility] [phone]                      Appointments  past 12m or future 3m with PCP    Date Provider   Last Visit   10/12/2018 Joanie To MD   Next Visit   10/16/2020 Joanie To MD   ED visits in past 90 days: [unfilled]        Note composed:9:41 AM 10/08/2020

## 2020-10-08 NOTE — TELEPHONE ENCOUNTER
No new care gaps identified.  Powered by [employer]. Reference number: [id number]. 10/08/2020 9:15:38 AM   IRVINGT

## 2020-10-09 ENCOUNTER — LAB VISIT (OUTPATIENT)
Dept: LAB | Facility: OTHER | Age: 65
End: 2020-10-09
Attending: INTERNAL MEDICINE
Payer: MEDICARE

## 2020-10-09 DIAGNOSIS — Z13.6 ENCOUNTER FOR LIPID SCREENING FOR CARDIOVASCULAR DISEASE: ICD-10-CM

## 2020-10-09 DIAGNOSIS — Z51.81 ENCOUNTER FOR MEDICATION MONITORING: ICD-10-CM

## 2020-10-09 DIAGNOSIS — Z13.220 ENCOUNTER FOR LIPID SCREENING FOR CARDIOVASCULAR DISEASE: ICD-10-CM

## 2020-10-09 DIAGNOSIS — F33.42 RECURRENT MAJOR DEPRESSIVE DISORDER, IN FULL REMISSION: ICD-10-CM

## 2020-10-09 LAB
ALBUMIN SERPL BCP-MCNC: 4.2 G/DL (ref 3.5–5.2)
ALP SERPL-CCNC: 60 U/L (ref 55–135)
ALT SERPL W/O P-5'-P-CCNC: 9 U/L (ref 10–44)
ANION GAP SERPL CALC-SCNC: 12 MMOL/L (ref 8–16)
AST SERPL-CCNC: 15 U/L (ref 10–40)
BASOPHILS # BLD AUTO: 0.03 K/UL (ref 0–0.2)
BASOPHILS NFR BLD: 0.6 % (ref 0–1.9)
BILIRUB SERPL-MCNC: 0.4 MG/DL (ref 0.1–1)
BUN SERPL-MCNC: 13 MG/DL (ref 8–23)
CALCIUM SERPL-MCNC: 9.3 MG/DL (ref 8.7–10.5)
CHLORIDE SERPL-SCNC: 105 MMOL/L (ref 95–110)
CHOLEST SERPL-MCNC: 180 MG/DL (ref 120–199)
CHOLEST/HDLC SERPL: 2.8 {RATIO} (ref 2–5)
CO2 SERPL-SCNC: 23 MMOL/L (ref 23–29)
CREAT SERPL-MCNC: 0.9 MG/DL (ref 0.5–1.4)
DIFFERENTIAL METHOD: NORMAL
EOSINOPHIL # BLD AUTO: 0.1 K/UL (ref 0–0.5)
EOSINOPHIL NFR BLD: 2.4 % (ref 0–8)
ERYTHROCYTE [DISTWIDTH] IN BLOOD BY AUTOMATED COUNT: 12.6 % (ref 11.5–14.5)
EST. GFR  (AFRICAN AMERICAN): >60 ML/MIN/1.73 M^2
EST. GFR  (NON AFRICAN AMERICAN): >60 ML/MIN/1.73 M^2
GLUCOSE SERPL-MCNC: 81 MG/DL (ref 70–110)
HCT VFR BLD AUTO: 38.4 % (ref 37–48.5)
HDLC SERPL-MCNC: 65 MG/DL (ref 40–75)
HDLC SERPL: 36.1 % (ref 20–50)
HGB BLD-MCNC: 12.5 G/DL (ref 12–16)
IMM GRANULOCYTES # BLD AUTO: 0.02 K/UL (ref 0–0.04)
IMM GRANULOCYTES NFR BLD AUTO: 0.4 % (ref 0–0.5)
LDLC SERPL CALC-MCNC: 104.2 MG/DL (ref 63–159)
LYMPHOCYTES # BLD AUTO: 1.5 K/UL (ref 1–4.8)
LYMPHOCYTES NFR BLD: 27.1 % (ref 18–48)
MCH RBC QN AUTO: 30.7 PG (ref 27–31)
MCHC RBC AUTO-ENTMCNC: 32.6 G/DL (ref 32–36)
MCV RBC AUTO: 94 FL (ref 82–98)
MONOCYTES # BLD AUTO: 0.4 K/UL (ref 0.3–1)
MONOCYTES NFR BLD: 7.9 % (ref 4–15)
NEUTROPHILS # BLD AUTO: 3.4 K/UL (ref 1.8–7.7)
NEUTROPHILS NFR BLD: 61.6 % (ref 38–73)
NONHDLC SERPL-MCNC: 115 MG/DL
NRBC BLD-RTO: 0 /100 WBC
PLATELET # BLD AUTO: 248 K/UL (ref 150–350)
PMV BLD AUTO: 10.8 FL (ref 9.2–12.9)
POTASSIUM SERPL-SCNC: 4.2 MMOL/L (ref 3.5–5.1)
PROT SERPL-MCNC: 7 G/DL (ref 6–8.4)
RBC # BLD AUTO: 4.07 M/UL (ref 4–5.4)
SODIUM SERPL-SCNC: 140 MMOL/L (ref 136–145)
TRIGL SERPL-MCNC: 54 MG/DL (ref 30–150)
TSH SERPL DL<=0.005 MIU/L-ACNC: 1.26 UIU/ML (ref 0.4–4)
WBC # BLD AUTO: 5.43 K/UL (ref 3.9–12.7)

## 2020-10-09 PROCEDURE — 80061 LIPID PANEL: CPT

## 2020-10-09 PROCEDURE — 85025 COMPLETE CBC W/AUTO DIFF WBC: CPT

## 2020-10-09 PROCEDURE — 80053 COMPREHEN METABOLIC PANEL: CPT

## 2020-10-09 PROCEDURE — 36415 COLL VENOUS BLD VENIPUNCTURE: CPT

## 2020-10-09 PROCEDURE — 84443 ASSAY THYROID STIM HORMONE: CPT

## 2020-10-16 ENCOUNTER — CLINICAL SUPPORT (OUTPATIENT)
Dept: INTERNAL MEDICINE | Facility: CLINIC | Age: 65
End: 2020-10-16
Payer: MEDICARE

## 2020-10-16 ENCOUNTER — HOSPITAL ENCOUNTER (OUTPATIENT)
Dept: RADIOLOGY | Facility: OTHER | Age: 65
Discharge: HOME | End: 2020-10-16
Attending: INTERNAL MEDICINE
Payer: MEDICARE

## 2020-10-16 ENCOUNTER — OFFICE VISIT (OUTPATIENT)
Dept: INTERNAL MEDICINE | Facility: CLINIC | Age: 65
End: 2020-10-16
Attending: INTERNAL MEDICINE
Payer: MEDICARE

## 2020-10-16 VITALS
OXYGEN SATURATION: 98 % | BODY MASS INDEX: 26.07 KG/M2 | WEIGHT: 182.13 LBS | SYSTOLIC BLOOD PRESSURE: 124 MMHG | HEART RATE: 83 BPM | HEIGHT: 70 IN | DIASTOLIC BLOOD PRESSURE: 62 MMHG

## 2020-10-16 DIAGNOSIS — G89.29 CHRONIC RIGHT SHOULDER PAIN: ICD-10-CM

## 2020-10-16 DIAGNOSIS — Z13.9 ENCOUNTER FOR SCREENING: ICD-10-CM

## 2020-10-16 DIAGNOSIS — Z12.31 ENCOUNTER FOR SCREENING MAMMOGRAM FOR MALIGNANT NEOPLASM OF BREAST: ICD-10-CM

## 2020-10-16 DIAGNOSIS — G89.29 CHRONIC RIGHT SHOULDER PAIN: Primary | ICD-10-CM

## 2020-10-16 DIAGNOSIS — M79.671 RIGHT FOOT PAIN: ICD-10-CM

## 2020-10-16 DIAGNOSIS — J02.9 PHARYNGITIS, UNSPECIFIED ETIOLOGY: ICD-10-CM

## 2020-10-16 DIAGNOSIS — N95.9 MENOPAUSAL PROBLEM: ICD-10-CM

## 2020-10-16 DIAGNOSIS — M25.511 CHRONIC RIGHT SHOULDER PAIN: ICD-10-CM

## 2020-10-16 DIAGNOSIS — R05.9 COUGH: ICD-10-CM

## 2020-10-16 DIAGNOSIS — M25.511 CHRONIC RIGHT SHOULDER PAIN: Primary | ICD-10-CM

## 2020-10-16 PROCEDURE — U0003 INFECTIOUS AGENT DETECTION BY NUCLEIC ACID (DNA OR RNA); SEVERE ACUTE RESPIRATORY SYNDROME CORONAVIRUS 2 (SARS-COV-2) (CORONAVIRUS DISEASE [COVID-19]), AMPLIFIED PROBE TECHNIQUE, MAKING USE OF HIGH THROUGHPUT TECHNOLOGIES AS DESCRIBED BY CMS-2020-01-R: HCPCS

## 2020-10-16 PROCEDURE — 90694 VACC AIIV4 NO PRSRV 0.5ML IM: CPT | Mod: PBBFAC

## 2020-10-16 PROCEDURE — 73030 X-RAY EXAM OF SHOULDER: CPT | Mod: TC,FY,RT

## 2020-10-16 PROCEDURE — 73630 X-RAY EXAM OF FOOT: CPT | Mod: 26,RT,, | Performed by: RADIOLOGY

## 2020-10-16 PROCEDURE — 73030 X-RAY EXAM OF SHOULDER: CPT | Mod: 26,RT,, | Performed by: RADIOLOGY

## 2020-10-16 PROCEDURE — 99214 OFFICE O/P EST MOD 30 MIN: CPT | Mod: S$PBB,,, | Performed by: INTERNAL MEDICINE

## 2020-10-16 PROCEDURE — 73030 XR SHOULDER COMPLETE 2 OR MORE VIEWS RIGHT: ICD-10-PCS | Mod: 26,RT,, | Performed by: RADIOLOGY

## 2020-10-16 PROCEDURE — 99211 OFF/OP EST MAY X REQ PHY/QHP: CPT | Mod: PBBFAC,25

## 2020-10-16 PROCEDURE — 73630 XR FOOT COMPLETE 3 VIEW RIGHT: ICD-10-PCS | Mod: 26,RT,, | Performed by: RADIOLOGY

## 2020-10-16 PROCEDURE — 99999 PR PBB SHADOW E&M-EST. PATIENT-LVL I: ICD-10-PCS | Mod: PBBFAC,,,

## 2020-10-16 PROCEDURE — G0008 ADMIN INFLUENZA VIRUS VAC: HCPCS | Mod: PBBFAC

## 2020-10-16 PROCEDURE — 99999 PR PBB SHADOW E&M-EST. PATIENT-LVL I: CPT | Mod: PBBFAC,,,

## 2020-10-16 PROCEDURE — 99215 OFFICE O/P EST HI 40 MIN: CPT | Mod: PBBFAC,25,27 | Performed by: INTERNAL MEDICINE

## 2020-10-16 PROCEDURE — 99214 PR OFFICE/OUTPT VISIT, EST, LEVL IV, 30-39 MIN: ICD-10-PCS | Mod: S$PBB,,, | Performed by: INTERNAL MEDICINE

## 2020-10-16 PROCEDURE — 73630 X-RAY EXAM OF FOOT: CPT | Mod: TC,FY,RT

## 2020-10-16 PROCEDURE — 99999 PR PBB SHADOW E&M-EST. PATIENT-LVL V: CPT | Mod: PBBFAC,,, | Performed by: INTERNAL MEDICINE

## 2020-10-16 PROCEDURE — 99999 PR PBB SHADOW E&M-EST. PATIENT-LVL V: ICD-10-PCS | Mod: PBBFAC,,, | Performed by: INTERNAL MEDICINE

## 2020-10-16 RX ORDER — AZELASTINE 1 MG/ML
1 SPRAY, METERED NASAL 2 TIMES DAILY
Qty: 30 ML | Refills: 3 | Status: SHIPPED | OUTPATIENT
Start: 2020-10-16 | End: 2021-12-15

## 2020-10-16 RX ORDER — NAPROXEN 500 MG/1
500 TABLET ORAL 2 TIMES DAILY WITH MEALS
Qty: 30 TABLET | Refills: 1 | Status: SHIPPED | OUTPATIENT
Start: 2020-10-16 | End: 2021-05-11

## 2020-10-16 RX ORDER — FAMOTIDINE 10 MG/1
10 TABLET ORAL 2 TIMES DAILY
Qty: 60 TABLET | Refills: 0 | COMMUNITY
Start: 2020-10-16 | End: 2021-12-15

## 2020-10-16 NOTE — PATIENT INSTRUCTIONS
Due for:   1. Flu vaccine  2. tdap - tetanus   3. prevnar 13 - pneumonia (already had pvax 23)  4. shingrix - shingles

## 2020-10-16 NOTE — PROGRESS NOTES
"Patient was given vaccine information sheet for the Flu Vaccine. The area of injection was palpated using the acromion process as a landmark. This area was cleaned with alcohol. Using a 25g 1" safety needle, 0.5mL of the vaccine was placed into the right deltoid muscle. The injection site was dressed with a bandage. Patient experienced no complications and was discharged in stable condition. FluadHigh Dose vaccine Lot: [id number] Exp: 06/30/2021.      "

## 2020-10-16 NOTE — PROGRESS NOTES
"Subjective:   Patient ID: Arben De Luna is a 65 y.o. female  Chief complaint:   Chief Complaint   Patient presents with    Annual Exam    Shoulder Pain    Flu Vaccine    Herpes Zoster       HPI    Here for annual exam   Granddaughter returned to [facility] for school recently, pt returned to teaching [place]    Injured right foot - stubbed toe and has been bracing it - able to bear weight but still painful      Depression:   - Doing well on effexor   - taking rx for a few years - no si/hi/rivera  - feels better with this but is considering weaning in near future     Osteopenia:   - taking ca 600mg bid /vit d 5000u daily  - less exercise but working to improve this since Covid   - was on fosamax in past for short period of time - tez but concerned about pot SE - opted to stop Rx and manage as above     Thinks utd on hep c screening   Tdap: due  Influenza: due  Pneumonia: due for prevnar 13   Shingles: due for shingrix    Right shoulder pain worse with resting on right side - radiates to arm   - no popping or clicking   - does not get stuck in place   -will awaken her from sleep on occ   - alleviating factors: stretches improved, ibuprofen will help intermittently but not taking often   - no ttp     - no redenss or swelling of j oint     Allergies:   Given claritin, flonase  Less sore throat with this   - no fevers or chills     No reflux symptoms   chronic cough - dry   allx testing   Seen by  ENT  Seen by pulm   - pfts reviewed along with cxr and ct chest    Review of Systems      Objective:  Vitals:    10/16/20 1403   BP: 124/62   BP Location: Left arm   Patient Position: Sitting   Pulse: 83   SpO2: 98%   Weight: 82.6 kg (182 lb 1.6 oz)   Height: 5' 10" (1.778 m)     Body mass index is 26.13 kg/m².    Physical Exam  Vitals signs reviewed.   Constitutional:       Appearance: Normal appearance. She is well-developed.   HENT:      Head: Normocephalic and atraumatic.      Right Ear: Tympanic membrane, ear canal and " external ear normal.      Left Ear: Tympanic membrane, ear canal and external ear normal.      Nose:      Comments: Wearing mask   Eyes:      Extraocular Movements: Extraocular movements intact.      Conjunctiva/sclera: Conjunctivae normal.   Neck:      Musculoskeletal: Neck supple.      Thyroid: No thyromegaly.   Cardiovascular:      Rate and Rhythm: Normal rate and regular rhythm.      Pulses: Normal pulses.      Heart sounds: Normal heart sounds.   Pulmonary:      Effort: Pulmonary effort is normal.      Breath sounds: Normal breath sounds.   Abdominal:      General: Bowel sounds are normal.      Palpations: Abdomen is soft.   Musculoskeletal: Normal range of motion.         General: Tenderness present. No swelling or deformity.      Comments: + ttp of right 3rd digit of right LE   + antalgic gait    Lymphadenopathy:      Cervical: No cervical adenopathy.   Skin:     General: Skin is warm and dry.      Capillary Refill: Capillary refill takes less than 2 seconds.   Neurological:      General: No focal deficit present.      Mental Status: She is alert and oriented to person, place, and time. Mental status is at baseline.   Psychiatric:         Behavior: Behavior normal.         Thought Content: Thought content normal.         Assessment:  1. Chronic right shoulder pain    2. Encounter for screening    3. Encounter for screening mammogram for malignant neoplasm of breast    4. Menopausal problem    5. Right foot pain    6. Cough    7. Pharyngitis        Plan:  Cont meds - refills given   Trial of scheduled nsaids for shoulder with pepcid - if not improved/resolved then will f/u with ENT  xrays today   Mmg/dexa ordered   Flu vaccine today   Reviewed other rec vaccines     Health Maintenance   Topic Date Due    Hepatitis C Screening  1955    TETANUS VACCINE  02/16/1971    Pneumococcal Vaccine (65+ High/Highest Risk) (2 of 2 - PCV13) 06/18/2019    Mammogram  11/27/2020    DEXA SCAN  10/17/2021    Lipid  Panel  10/09/2025

## 2020-10-17 LAB — SARS-COV-2 RNA RESP QL NAA+PROBE: NOT DETECTED

## 2020-10-19 ENCOUNTER — TELEPHONE (OUTPATIENT)
Dept: INTERNAL MEDICINE | Facility: CLINIC | Age: 65
End: 2020-10-19

## 2020-10-19 ENCOUNTER — PATIENT OUTREACH (OUTPATIENT)
Dept: ADMINISTRATIVE | Facility: OTHER | Age: 65
End: 2020-10-19

## 2020-10-19 ENCOUNTER — TELEPHONE (OUTPATIENT)
Dept: ADMINISTRATIVE | Facility: OTHER | Age: 65
End: 2020-10-19

## 2020-10-19 DIAGNOSIS — S92.211A DISPLACED FRACTURE OF CUBOID BONE OF RIGHT FOOT, INITIAL ENCOUNTER FOR CLOSED FRACTURE: Primary | ICD-10-CM

## 2020-10-19 DIAGNOSIS — S92.811A OTHER FRACTURE OF RIGHT FOOT, INITIAL ENCOUNTER FOR CLOSED FRACTURE: ICD-10-CM

## 2020-10-19 NOTE — PROGRESS NOTES
Health Maintenance Due   Topic Date Due    Hepatitis C Screening  1955    TETANUS VACCINE  02/16/1971    Shingles Vaccine (2 of 3) 08/13/2018    Pneumococcal Vaccine (65+ High/Highest Risk) (2 of 2 - PCV13) 06/18/2019    Mammogram  11/27/2020     Updates were requested from care everywhere.  Chart was reviewed for overdue Proactive [facility] Encounters (BRENDA) topics (CRS, Breast Cancer Screening, Eye exam)  Health Maintenance has been updated.  LINKS immunization registry triggered.  Immunizations were reconciled.

## 2020-10-19 NOTE — TELEPHONE ENCOUNTER
Left voice message for patient to return call to schedule appointment from referral to Orthopedics.  Blanche ZHENG [phone]

## 2020-10-19 NOTE — TELEPHONE ENCOUNTER
Patient scheduled tomorrow morning with Dr. Tejeda. Spoke with patient on the telephone and confirmed appointment.

## 2020-10-19 NOTE — TELEPHONE ENCOUNTER
Message sent to pt via my chart with results and updates to plan.     Please arrange appt ASAP with podiatry this week

## 2020-10-20 ENCOUNTER — OFFICE VISIT (OUTPATIENT)
Dept: PODIATRY | Facility: CLINIC | Age: 65
End: 2020-10-20
Attending: INTERNAL MEDICINE
Payer: MEDICARE

## 2020-10-20 VITALS
SYSTOLIC BLOOD PRESSURE: 123 MMHG | DIASTOLIC BLOOD PRESSURE: 70 MMHG | HEIGHT: 70 IN | WEIGHT: 182 LBS | HEART RATE: 62 BPM | BODY MASS INDEX: 26.05 KG/M2

## 2020-10-20 DIAGNOSIS — M79.674 TOE PAIN, RIGHT: Primary | ICD-10-CM

## 2020-10-20 DIAGNOSIS — S92.501A CLOSED FRACTURE OF PHALANX OF RIGHT FOURTH TOE, INITIAL ENCOUNTER: ICD-10-CM

## 2020-10-20 PROCEDURE — 99214 OFFICE O/P EST MOD 30 MIN: CPT | Mod: PBBFAC,PN,25 | Performed by: PODIATRIST

## 2020-10-20 PROCEDURE — 99999 PR PBB SHADOW E&M-EST. PATIENT-LVL IV: CPT | Mod: PBBFAC,,, | Performed by: PODIATRIST

## 2020-10-20 PROCEDURE — 99999 PR PBB SHADOW E&M-EST. PATIENT-LVL IV: ICD-10-PCS | Mod: PBBFAC,,, | Performed by: PODIATRIST

## 2020-10-20 PROCEDURE — 99202 OFFICE O/P NEW SF 15 MIN: CPT | Mod: 25,S$PBB,, | Performed by: PODIATRIST

## 2020-10-20 PROCEDURE — 28510 TREATMENT OF TOE FRACTURE: CPT | Mod: T8,PBBFAC,PN,RT | Performed by: PODIATRIST

## 2020-10-20 PROCEDURE — 99202 PR OFFICE/OUTPT VISIT, NEW, LEVL II, 15-29 MIN: ICD-10-PCS | Mod: 25,S$PBB,, | Performed by: PODIATRIST

## 2020-10-20 PROCEDURE — 28510 PR CLOSED RX TOE FX: ICD-10-PCS | Mod: T8,S$PBB,RT, | Performed by: PODIATRIST

## 2020-10-20 PROCEDURE — 28510 TREATMENT OF TOE FRACTURE: CPT | Mod: T8,S$PBB,RT, | Performed by: PODIATRIST

## 2020-10-20 NOTE — LETTER
October 20, 2020      Joanie To MD  [address] [facility] [address]           [facility]  [address] [address]  Phone: [phone]  Fax: [phone]          Patient: Arben De Luna   MR Number: [id number]   YOB: 1955   Date of Visit: 10/20/2020       Dear Dr. Joanie To:    Thank you for referring Arben De Luna to me for evaluation. Attached you will find relevant portions of my assessment and plan of care.    If you have questions, please do not hesitate to call me. I look forward to following Arben De Luna along with you.    Sincerely,    Pete Tejeda, AMY    Enclosure  CC:  No Recipients    If you would like to receive this communication electronically, please contact [email or web address] or [phone] to request more information on [facility] Link access.    For providers and/or their staff who would like to refer a patient to [facility], please contact us through our one-stop-shop provider referral line, [facility], at [phone].    If you feel you have received this communication in error or would no longer like to receive these types of communications, please e-mail [email or web address]

## 2020-10-20 NOTE — PROGRESS NOTES
Subjective:      Patient ID: Arben De Luna is a 65 y.o. female.    Chief Complaint: Toe Injury (Right Foot)    Sharp deep throbbing pain right foot 4th toe.  Rapid onset stubbing toe on granddaughter's heel.  Minimal improvement since.  No prior medical treatment.  Ambulating in casual shoes with reasonable function.  Denies repeat trauma and surgery right foot.  No self-treatment.    Review of Systems   Constitution: Negative for chills, diaphoresis, fever, malaise/fatigue and night sweats.   Cardiovascular: Negative for claudication, cyanosis, leg swelling and syncope.   Skin: Negative for color change, dry skin, nail changes, rash, suspicious lesions and unusual hair distribution.   Musculoskeletal: Positive for joint pain and joint swelling. Negative for falls, muscle cramps, muscle weakness and stiffness.   Gastrointestinal: Negative for constipation, diarrhea, nausea and vomiting.   Neurological: Negative for brief paralysis, disturbances in coordination, focal weakness, numbness, paresthesias, sensory change and tremors.           Objective:      Physical Exam  Constitutional:       General: She is not in acute distress.     Appearance: She is well-developed. She is not diaphoretic.   Cardiovascular:      Pulses:           Popliteal pulses are 2+ on the right side and 2+ on the left side.        Dorsalis pedis pulses are 2+ on the right side and 2+ on the left side.        Posterior tibial pulses are 2+ on the right side and 2+ on the left side.      Comments: Capillary refill 3 seconds all toes/distal feet, all toes/both feet warm to touch.      Negative lymphadenopathy bilateral popliteal fossa and tarsal tunnel.      Negavie lower extremity edema bilateral.    Musculoskeletal:      Right ankle: She exhibits normal range of motion, no swelling, no ecchymosis, no deformity, no laceration and normal pulse. Achilles tendon normal. Achilles tendon exhibits no pain, no defect and normal Mobley's test results.       Comments: Minor focal localized swelling at the PIPJ right 4th toe and DIPJ right 4th toe without deformity, loss of function, or acute signs of trauma.    Otherwise, Normal angle, base, station of gait. All ten toes without clubbing, cyanosis, or signs of ischemia.  No pain to palpation bilateral lower extremities.  Range of motion, stability, muscle strength, and muscle tone normal bilateral feet and legs.    Lymphadenopathy:      Lower Body: No right inguinal adenopathy. No left inguinal adenopathy.      Comments: Negative lymphadenopathy bilateral popliteal fossa and tarsal tunnel.    Negative lymphangitic streaking bilateral feet/ankles/legs.   Skin:     General: Skin is warm and dry.      Capillary Refill: Capillary refill takes 2 to 3 seconds.      Coloration: Skin is not pale.      Findings: No abrasion, bruising, burn, ecchymosis, erythema, laceration, lesion or rash.      Nails: There is no clubbing.        Comments: Skin is normal age and health appropriate color, turgor, texture, and temperature bilateral lower extremities without ulceration, hyperpigmentation, discoloration, masses nodules or cords palpated.  No ecchymosis, erythema, edema, or cardinal signs of infection bilateral lower extremities.       Neurological:      Mental Status: She is alert and oriented to person, place, and time.      Sensory: No sensory deficit.      Motor: No tremor, atrophy or abnormal muscle tone.      Gait: Gait normal.      Deep Tendon Reflexes:      Reflex Scores:       Patellar reflexes are 2+ on the right side and 2+ on the left side.       Achilles reflexes are 2+ on the right side and 2+ on the left side.     Comments: Negative tinel sign to percussion sural, superficial peroneal, deep peroneal, saphenous, and posterior tibial nerves right and left ankles and feet.     Psychiatric:         Behavior: Behavior is cooperative.               Assessment:       Encounter Diagnoses   Name Primary?    Closed fracture  of phalanx of right fourth toe, initial encounter     Toe pain, right Yes         Plan:       Arben was seen today for toe injury.    Diagnoses and all orders for this visit:    Toe pain, right  -     X-Ray Foot Complete Right; Future    Closed fracture of phalanx of right fourth toe, initial encounter  -     Ambulatory referral/consult to Podiatry  -     X-Ray Foot Complete Right; Future      I counseled the patient on her conditions, their implications and medical management.        Discussed conservative treatment with shoes of adequate dimensions, material, and style to alleviate symptoms and delay or prevent surgical intervention.    Arjun splint right toes 3,4,5 - repeat daily as demonstrated in office.    Stiff soled shoe right - ambulate to tolerance.    Declines sx shoe.    3 weeks xrays prior.    otc tylenol prn per label.          Follow up in about 3 weeks (around 11/10/2020) for right 4th toe fx.

## 2020-10-23 ENCOUNTER — HOSPITAL ENCOUNTER (OUTPATIENT)
Dept: RADIOLOGY | Facility: OTHER | Age: 65
Discharge: HOME | End: 2020-10-23
Attending: INTERNAL MEDICINE
Payer: MEDICARE

## 2020-10-23 DIAGNOSIS — R05.9 COUGH: ICD-10-CM

## 2020-10-23 PROCEDURE — 71250 CT CHEST WITHOUT CONTRAST: ICD-10-PCS | Mod: 26,,, | Performed by: RADIOLOGY

## 2020-10-23 PROCEDURE — 71250 CT THORAX DX C-: CPT | Mod: 26,,, | Performed by: RADIOLOGY

## 2020-10-23 PROCEDURE — 71250 CT THORAX DX C-: CPT | Mod: TC

## 2020-10-30 DIAGNOSIS — F33.42 RECURRENT MAJOR DEPRESSIVE DISORDER, IN FULL REMISSION: ICD-10-CM

## 2020-10-30 RX ORDER — VENLAFAXINE HYDROCHLORIDE 150 MG/1
150 CAPSULE, EXTENDED RELEASE ORAL DAILY
Qty: 90 CAPSULE | Refills: 3 | Status: SHIPPED | OUTPATIENT
Start: 2020-10-30 | End: 2021-10-20

## 2020-10-30 NOTE — PROGRESS NOTES
Refill Authorization Note   Arben De Luna is requesting a refill authorization.  Brief assessment and rationale for refill: Approve     Medication Therapy Plan: [facility]    Medication reconciliation completed: No   Comments:   Orders Placed This Encounter    venlafaxine (EFFEXOR-XR) 150 MG Cp24      Requested Prescriptions   Signed Prescriptions Disp Refills    venlafaxine (EFFEXOR-XR) 150 MG Cp24 90 capsule 3     Sig: TAKE 1 CAPSULE (150 MG TOTAL) BY MOUTH ONCE DAILY.       Psychiatry: Antidepressants - SNRI - desvenlafaxine & venlafaxine Passed - 10/30/2020 12:42 AM        Passed - Patient is at least 18 years old        Passed - Last BP in normal range within 360 days.     BP Readings from Last 3 Encounters:   10/20/20 123/70   10/16/20 124/62   10/05/20 137/89              Passed - Office visit in past 12 months or future 90 days     Recent Outpatient Visits            1 week ago Toe pain, right    [place] [facility] Pod[facility] Pete Tejeda DPM    2 weeks ago [facility] Jo[facility] To [facility]    3 weeks ago Seasonal allergies    [facility] Ayana Farris NP    11 months ago Neoplasm of uncertain behavior of skin    [facility] Dermatology Heydi Leroy MD    1 year ago Cough    [address] [facility] [address] Michelle Sheppard, RAMÓN          Future Appointments              In 1 week [facility] X[facility] [place] [facility], [place]    In 1 week Pete Tejeda DPM [place] - [facility], [place]                Passed - Cr is 1.4 or below and within 360 days     Creatinine   Date Value Ref Range Status   10/09/2020 0.9 0.5 - 1.4 mg/dL Final   10/17/2018 0.8 0.5 - 1.4 mg/dL Final              Passed - eGFR within 360 days     eGFR if non    Date Value Ref Range Status   10/09/2020 >60 >60 mL/min/1.73 m^2 Final     Comment:     Calculation used to obtain the estimated glomerular filtration  rate (eGFR) is the CKD-EPI  equation.      10/17/2018 >60 >60 mL/min/1.73 m^2 Final     Comment:     Calculation used to obtain the estimated glomerular filtration  rate (eGFR) is the CKD-EPI equation.        eGFR if    Date Value Ref Range Status   10/09/2020 >60 >60 mL/min/1.73 m^2 Final   10/17/2018 >60 >60 mL/min/1.73 m^2 Final                  Appointments  past 12m or future 3m with PCP    Date Provider   Last Visit   10/16/2020 Joanie To MD   Next Visit   Visit date not found Joanie To MD   ED visits in past 90 days: 0     Note composed:2:33 PM 10/30/2020

## 2020-10-30 NOTE — TELEPHONE ENCOUNTER
No new care gaps identified.  Powered by [employer] Reference number: [id number]. 10/30/2020 12:43:05 AM   RIVINGT  
urbano

## 2020-11-10 ENCOUNTER — TELEPHONE (OUTPATIENT)
Dept: PODIATRY | Facility: CLINIC | Age: 65
End: 2020-11-10

## 2020-11-10 ENCOUNTER — APPOINTMENT (OUTPATIENT)
Dept: RADIOLOGY | Facility: OTHER | Age: 65
End: 2020-11-10
Attending: PODIATRIST
Payer: MEDICARE

## 2020-11-10 ENCOUNTER — PATIENT MESSAGE (OUTPATIENT)
Dept: PODIATRY | Facility: CLINIC | Age: 65
End: 2020-11-10

## 2020-11-10 DIAGNOSIS — S92.501A CLOSED FRACTURE OF PHALANX OF RIGHT FOURTH TOE, INITIAL ENCOUNTER: ICD-10-CM

## 2020-11-10 DIAGNOSIS — M79.674 TOE PAIN, RIGHT: ICD-10-CM

## 2020-11-10 PROCEDURE — 73630 X-RAY EXAM OF FOOT: CPT | Mod: TC,RT

## 2020-11-10 PROCEDURE — 73630 XR FOOT COMPLETE 3 VIEW RIGHT: ICD-10-PCS | Mod: 26,RT,, | Performed by: RADIOLOGY

## 2020-11-10 PROCEDURE — 73630 X-RAY EXAM OF FOOT: CPT | Mod: 26,RT,, | Performed by: RADIOLOGY

## 2020-11-10 NOTE — TELEPHONE ENCOUNTER
Called patient and left message for patient to call office to reschedule 11-  appointment with Dr Tejeda, to later time today. Patient was instructed to call [phone] or [phone] to reschedule.

## 2020-11-17 ENCOUNTER — OFFICE VISIT (OUTPATIENT)
Dept: PODIATRY | Facility: CLINIC | Age: 65
End: 2020-11-17
Payer: MEDICARE

## 2020-11-17 VITALS
HEIGHT: 70 IN | HEART RATE: 72 BPM | BODY MASS INDEX: 26.05 KG/M2 | SYSTOLIC BLOOD PRESSURE: 126 MMHG | WEIGHT: 182 LBS | DIASTOLIC BLOOD PRESSURE: 80 MMHG

## 2020-11-17 DIAGNOSIS — M24.573 EQUINUS CONTRACTURE OF ANKLE: ICD-10-CM

## 2020-11-17 DIAGNOSIS — M19.071 ARTHRITIS OF FOOT, RIGHT: Primary | ICD-10-CM

## 2020-11-17 DIAGNOSIS — S96.912A STRAIN OF LEFT ANKLE, INITIAL ENCOUNTER: ICD-10-CM

## 2020-11-17 PROCEDURE — 99024 PR POST-OP FOLLOW-UP VISIT: ICD-10-PCS | Mod: S$PBB,,, | Performed by: PODIATRIST

## 2020-11-17 PROCEDURE — 99999 PR PBB SHADOW E&M-EST. PATIENT-LVL III: ICD-10-PCS | Mod: PBBFAC,,, | Performed by: PODIATRIST

## 2020-11-17 PROCEDURE — 99024 POSTOP FOLLOW-UP VISIT: CPT | Mod: S$PBB,,, | Performed by: PODIATRIST

## 2020-11-17 PROCEDURE — 99999 PR PBB SHADOW E&M-EST. PATIENT-LVL III: CPT | Mod: PBBFAC,,, | Performed by: PODIATRIST

## 2020-11-17 PROCEDURE — 99213 OFFICE O/P EST LOW 20 MIN: CPT | Mod: PBBFAC,PN | Performed by: PODIATRIST

## 2020-11-17 RX ORDER — LIDOCAINE HYDROCHLORIDE 20 MG/ML
JELLY TOPICAL
Qty: 30 ML | Refills: 2 | Status: SHIPPED | OUTPATIENT
Start: 2020-11-17 | End: 2021-05-11

## 2020-11-17 NOTE — PROGRESS NOTES
Subjective:      Patient ID: Arben De Luna is a 65 y.o. female.    Chief Complaint: Toe Injury (Right 4th digit), Ankle Pain (Left Ankle), and Foot Pain (Right foot)    Sharp pain 4th toe right, resolved with immobilization and gradual return to normal shoes and activity.  X-rays last week confirm healing in adequate position.    CC2 left lateral ankle pain.  Rapid onset twisting foot funny approximately 1 month ago.  Improving gradually since.  Aggravated with some increased activity in shoes.  No prior medical treatment, no self-treatment.  Denies repeat trauma and surgery left ankle.    CC3  Right deep aching midfoot pain sharp sometimes.  Gradual onset, worsening intermittently over the past 6 weeks.  Aggravated with increased weight-bearing activity and some shoe gear.  Orthotics have helped this in the past, but they are 7 years old now.  No self-treatment.  Denies trauma and surgery right foot.    Review of Systems   Constitution: Negative for chills, diaphoresis, fever, malaise/fatigue and night sweats.   Cardiovascular: Negative for claudication, cyanosis, leg swelling and syncope.   Skin: Negative for color change, dry skin, nail changes, rash, suspicious lesions and unusual hair distribution.   Musculoskeletal: Positive for joint pain. Negative for falls, joint swelling, muscle cramps, muscle weakness and stiffness.   Gastrointestinal: Negative for constipation, diarrhea, nausea and vomiting.   Neurological: Negative for brief paralysis, disturbances in coordination, focal weakness, numbness, paresthesias, sensory change and tremors.           Objective:      Physical Exam  Musculoskeletal:      Comments: Tenderness to palpation anterior lateral and inferior lateral talar fibular and talocalcaneal joints without deformity, loss of function, signs of acute trauma.    DP being throbbing pain in the right mid foot not elicited with palpation range of motion nonweightbearing or weight-bearing today in the  office.    Both feet and ankles without emily deformity, loss of function, signs of acute per Trauma today.    O;therwise, Normal angle, base, station of gait. All ten toes without clubbing, cyanosis, or signs of ischemia.  No pain to palpation bilateral lower extremities.  Range of motion, stability, muscle strength, and muscle tone normal bilateral feet and legs.    Skin:     Comments: Skin is normal age and health appropriate color, turgor, texture, and temperature bilateral lower extremities without ulceration, hyperpigmentation, discoloration, masses nodules or cords palpated.  No ecchymosis, erythema, edema, or cardinal signs of infection bilateral lower extremities.     Neurological:      Comments: Negative tinel sign to percussion sural, superficial peroneal, deep peroneal, saphenous, and posterior tibial nerves right and left ankles and feet.                 Assessment:       Encounter Diagnoses   Name Primary?    Arthritis of foot, right Yes    Equinus contracture of ankle     Strain of left ankle, initial encounter          Plan:       Arben was seen today for toe injury, ankle pain and foot pain.    Diagnoses and all orders for this visit:    Arthritis of foot, right  -     ORTHOTIC DEVICE (DME)    Equinus contracture of ankle  -     ORTHOTIC DEVICE (DME)    Strain of left ankle, initial encounter  -     ORTHOTIC DEVICE (DME)    Other orders  -     lidocaine HCL 2% (XYLOCAINE) 2 % jelly; Apply topically as needed. Apply topically once nightly to affected part of foot/feet.      I counseled the patient on her conditions, their implications and medical management.        Lidocaine gel.    Patient will stretch the tendo achilles complex three times daily as demonstrated in the office.  Literature was dispensed illustrating proper stretching technique.    Patient will obtain over the counter arch supports and wear them in shoes whenever possible.  Athletic shoes intended for walking or running are usually  best.    Discussed conservative treatment with shoes of adequate dimensions, material, and style to alleviate symptoms and delay or prevent surgical intervention.    Declines x-rays left ankle.    Prescribed custom orthotics.          Follow up if symptoms worsen or fail to improve, for Declined scheduled follow-up pending symptomatic improved.

## 2020-11-17 NOTE — PROGRESS NOTES
Subjective:      Patient ID: Arben De Luna is a 65 y.o. female.    Chief Complaint: Toe Injury (Right 4th digit), Ankle Pain (Left Ankle), and Foot Pain (Right foot)    CC1: Deep throbbing pain in outer side of left ankle x 4 weeks. She reports spraining left ankle inwards 4 weeks ago. Denies bruising, + swelling. Rates pain as 2/10. Pain is aggravated by pressure, weightbearing. No prior treatments. OTC NSAIDs providing minimal relief. Of note, patient reports hx of frequent ankle sprains.     CC2: Deep throbbing pain on the top of the right foot x 4 weeks. Patient points to right dorsal medial midfoot at TMT joint. Gradual onset.  Pain is only present with weightbearing. Denies any recent injury to right foot. No history of foot surgery.    Pain from right 4th digit fracture has completely resolved since last clinic visit. Repeat xray right foot 11/10/2020 with healing fracture of the proximal phalanx of the 4th digit with good alignment.     Review of Systems   Constitution: Negative for chills, diaphoresis, fever, malaise/fatigue and night sweats.   Cardiovascular: Negative for claudication, cyanosis, leg swelling and syncope.   Skin: Negative for color change, dry skin, nail changes, rash, suspicious lesions and unusual hair distribution.   Musculoskeletal: Positive for joint pain and joint swelling. Negative for falls, muscle cramps, muscle weakness and stiffness.   Gastrointestinal: Negative for constipation, diarrhea, nausea and vomiting.   Neurological: Negative for brief paralysis, disturbances in coordination, focal weakness, numbness, paresthesias, sensory change and tremors.           Objective:      Physical Exam  Constitutional:       General: She is not in acute distress.     Appearance: She is well-developed. She is not diaphoretic.   Cardiovascular:      Pulses:           Popliteal pulses are 2+ on the right side and 2+ on the left side.        Dorsalis pedis pulses are 2+ on the right side and 2+ on  the left side.        Posterior tibial pulses are 2+ on the right side and 2+ on the left side.      Comments: Capillary refill 3 seconds all toes/distal feet, all toes/both feet warm to touch.      Negative lymphadenopathy bilateral popliteal fossa and tarsal tunnel.      Negative lower extremity edema bilateral.    Musculoskeletal:      Right ankle: She exhibits normal range of motion, no swelling, no ecchymosis, no deformity, no laceration and normal pulse. Achilles tendon normal. Achilles tendon exhibits no pain, no defect and normal Mobley's test results.      Comments: Minor focal localized swelling at the left lateral malleolus without deformity, loss of function, or acute signs of trauma.    Mild tenderness to palpation to lateral ankle gutter and ATFL. No tenderness to left fibula.   No tenderness over the medial aspect of the ankle. The fifth metatarsal is not tender. The ankle joint is intact without excessive opening on stressing.     Negative anterior drawer sign, left.     Otherwise, Normal angle, base, station of gait. All ten toes without clubbing, cyanosis, or signs of ischemia. Range of motion, stability, muscle strength, and muscle tone normal bilateral feet and legs.    Lymphadenopathy:      Lower Body: No right inguinal adenopathy. No left inguinal adenopathy.      Comments: Negative lymphadenopathy bilateral popliteal fossa and tarsal tunnel.    Negative lymphangitic streaking bilateral feet/ankles/legs.   Skin:     General: Skin is warm and dry.      Capillary Refill: Capillary refill takes 2 to 3 seconds.      Coloration: Skin is not pale.      Findings: No abrasion, bruising, burn, ecchymosis, erythema, laceration, lesion or rash.      Nails: There is no clubbing.        Comments: Skin is normal age and health appropriate color, turgor, texture, and temperature bilateral lower extremities without ulceration, hyperpigmentation, discoloration, masses nodules or cords palpated.  No  ecchymosis, erythema, edema, or cardinal signs of infection bilateral lower extremities.       Neurological:      Mental Status: She is alert and oriented to person, place, and time.      Sensory: No sensory deficit.      Motor: No tremor, atrophy or abnormal muscle tone.      Gait: Gait normal.      Deep Tendon Reflexes:      Reflex Scores:       Patellar reflexes are 2+ on the right side and 2+ on the left side.       Achilles reflexes are 2+ on the right side and 2+ on the left side.     Comments: Negative tinel sign to percussion sural, superficial peroneal, deep peroneal, saphenous, and posterior tibial nerves right and left ankles and feet.     Psychiatric:         Behavior: Behavior is cooperative.               Assessment:       Encounter Diagnoses   Name Primary?    Arthritis of foot, right Yes    Equinus contracture of ankle     Strain of left ankle, initial encounter          Plan:       Arben was seen today for toe injury, ankle pain and foot pain.    Diagnoses and all orders for this visit:    Arthritis of foot, right  -     ORTHOTIC DEVICE (DME)    Equinus contracture of ankle  -     ORTHOTIC DEVICE (DME)    Strain of left ankle, initial encounter  -     ORTHOTIC DEVICE (DME)    Other orders  -     lidocaine HCL 2% (XYLOCAINE) 2 % jelly; Apply topically as needed. Apply topically once nightly to affected part of foot/feet.      I counseled the patient on her conditions, their implications and medical management.    Patient will stretch the tendo achilles complex three times daily as demonstrated in the office.  Literature was dispensed illustrating proper stretching technique.     Patient will obtain over the counter arch supports and wear them in shoes whenever possible.  Athletic shoes intended for walking or running are usually best.     Discussed conservative treatment with shoes of adequate dimensions, material, and style to alleviate symptoms and delay or prevent surgical intervention.     Rx  custom molded inserts    Rx lidocaine topical for symptom relief.    RTC prn      Patient seen and assisted by resident Nishi Boggs PGY2 under the direct supervision of attending Dr. Tejeda

## 2020-11-22 ENCOUNTER — OFFICE VISIT (OUTPATIENT)
Dept: URGENT CARE | Facility: CLINIC | Age: 65
End: 2020-11-22
Payer: MEDICARE

## 2020-11-22 VITALS
BODY MASS INDEX: 26.05 KG/M2 | RESPIRATION RATE: 18 BRPM | OXYGEN SATURATION: 97 % | TEMPERATURE: 98 F | WEIGHT: 182 LBS | SYSTOLIC BLOOD PRESSURE: 110 MMHG | HEIGHT: 70 IN | DIASTOLIC BLOOD PRESSURE: 57 MMHG | HEART RATE: 68 BPM
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
## 2020-11-22 DIAGNOSIS — M25.531 RIGHT WRIST PAIN: ICD-10-CM

## 2020-11-22 DIAGNOSIS — S82.65XA CLOSED NONDISPLACED FRACTURE OF LATERAL MALLEOLUS OF LEFT FIBULA, INITIAL ENCOUNTER: ICD-10-CM

## 2020-11-22 DIAGNOSIS — M25.562 ACUTE PAIN OF LEFT KNEE: ICD-10-CM

## 2020-11-22 PROCEDURE — 73610 X-RAY EXAM OF ANKLE: CPT | Mod: LT,S$GLB,, | Performed by: RADIOLOGY

## 2020-11-22 PROCEDURE — 73564 X-RAY EXAM KNEE 4 OR MORE: CPT | Mod: LT,S$GLB,, | Performed by: RADIOLOGY

## 2020-11-22 PROCEDURE — 73110 X-RAY EXAM OF WRIST: CPT | Mod: RT,S$GLB,, | Performed by: RADIOLOGY

## 2020-11-22 PROCEDURE — 99214 PR OFFICE/OUTPT VISIT, EST, LEVL IV, 30-39 MIN: ICD-10-PCS | Mod: S$GLB,,, | Performed by: NURSE PRACTITIONER

## 2020-11-22 PROCEDURE — 73110 XR WRIST COMPLETE 3 VIEWS RIGHT: ICD-10-PCS | Mod: RT,S$GLB,, | Performed by: RADIOLOGY

## 2020-11-22 PROCEDURE — 99214 OFFICE O/P EST MOD 30 MIN: CPT | Mod: S$GLB,,, | Performed by: NURSE PRACTITIONER

## 2020-11-22 PROCEDURE — 73564 XR KNEE COMP 4 OR MORE VIEWS LEFT: ICD-10-PCS | Mod: LT,S$GLB,, | Performed by: RADIOLOGY

## 2020-11-22 PROCEDURE — 73610 XR ANKLE COMPLETE 3 VIEW LEFT: ICD-10-PCS | Mod: LT,S$GLB,, | Performed by: RADIOLOGY

## 2020-11-22 NOTE — PROGRESS NOTES
"Subjective:       Patient ID: Arben De Luna is a 65 y.o. female.    Vitals:  height is 5' 10" (1.778 m) and weight is 82.6 kg (182 lb). Her temperature is 97.8 °F (36.6 °C). Her blood pressure is 110/57 (abnormal) and her pulse is 68. Her respiration is 18 and oxygen saturation is 97%.     Chief Complaint: Fall    Fall  The accident occurred 6 to 12 hours ago. The fall occurred while walking. The pain is present in the right wrist and left knee. The pain is at a severity of 5/10. The pain is moderate. The symptoms are aggravated by ambulation. Pertinent negatives include no abdominal pain, hematuria or loss of consciousness. She has tried ice for the symptoms.       Constitution: Negative for fatigue.   HENT: Negative for facial swelling and facial trauma.    Neck: Negative for neck stiffness.   Cardiovascular: Negative for chest trauma.   Eyes: Negative for eye trauma, double vision and blurred vision.   Gastrointestinal: Negative for abdominal trauma, abdominal pain and rectal bleeding.   Genitourinary: Negative for hematuria, missed menses, genital trauma and pelvic pain.   Musculoskeletal: Positive for trauma and joint pain. Negative for pain, joint swelling and abnormal ROM of joint.   Skin: Negative for color change, wound, abrasion, laceration and bruising.   Neurological: Negative for dizziness, history of vertigo, light-headedness, coordination disturbances, altered mental status and loss of consciousness.   Hematologic/Lymphatic: Negative for history of bleeding disorder.   Psychiatric/Behavioral: Negative for altered mental status.       Objective:      Physical Exam   Constitutional: She is oriented to person, place, and time. She appears well-developed. She is cooperative.  Non-toxic appearance. She does not appear ill. No distress.   HENT:   Head: Normocephalic and atraumatic.   Ears:   Right Ear: Hearing, tympanic membrane, external ear and ear canal normal.   Left Ear: Hearing, tympanic membrane, " external ear and ear canal normal.   Nose: Nose normal. No mucosal edema, rhinorrhea or nasal deformity. No epistaxis. Right sinus exhibits no maxillary sinus tenderness and no frontal sinus tenderness. Left sinus exhibits no maxillary sinus tenderness and no frontal sinus tenderness.   Mouth/Throat: Uvula is midline, oropharynx is clear and moist and mucous membranes are normal. No trismus in the jaw. Normal dentition. No uvula swelling. No posterior oropharyngeal erythema.   Eyes: Conjunctivae and lids are normal. Right eye exhibits no discharge. Left eye exhibits no discharge. No scleral icterus.   Neck: Trachea normal, normal range of motion, full passive range of motion without pain and phonation normal. Neck supple.   Cardiovascular: Normal rate, regular rhythm, normal heart sounds and normal pulses.   Pulmonary/Chest: Effort normal and breath sounds normal. No respiratory distress.   Abdominal: Soft. Normal appearance and bowel sounds are normal. She exhibits no distension, no pulsatile midline mass and no mass. There is no abdominal tenderness.   Musculoskeletal:         General: No deformity.      Right wrist: She exhibits tenderness and bony tenderness. She exhibits normal range of motion, no swelling and no deformity.      Left knee: She exhibits normal range of motion and no swelling. Tenderness found.      Left ankle: She exhibits decreased range of motion and swelling. She exhibits no deformity. Tenderness. Lateral malleolus tenderness found.        Feet:    Neurological: She is alert and oriented to person, place, and time. She exhibits normal muscle tone. Coordination normal.   Skin: Skin is warm, dry, intact, not diaphoretic and not pale. Psychiatric: Her speech is normal and behavior is normal. Judgment and thought content normal.   Nursing note and vitals reviewed.      X-ray Wrist Complete Right    Result Date: 11/22/2020  EXAMINATION: XR WRIST COMPLETE 3 VIEWS RIGHT CLINICAL HISTORY: Unspecified  fall, initial encounter TECHNIQUE: PA, lateral, and oblique views of the right wrist were performed. COMPARISON: None FINDINGS: There is no acute fracture, dislocation, or bone destruction.  There is mild carpal crowding.  No erosions.     No acute fracture. Electronically signed by: Noa Cho MD Date:    11/22/2020 Time:    11:36    X-ray Knee Complete 4 Or More Views Left    Result Date: 11/22/2020  EXAMINATION: XR KNEE COMP 4 OR MORE VIEWS LEFT CLINICAL HISTORY: Unspecified fall, initial encounter TECHNIQUE: Single frontal view of both knees, sunrise view of both knees, and lateral view of the left knee were obtained. COMPARISON: Prior dated 02/25/2019 FINDINGS: Left knee: There is mild medial compartment joint space narrowing and patellofemoral compartment joint space narrowing marginal osteophyte formation.  No acute fracture is detected.  There is a small suprapatellar joint effusion.  Patellar enthesophyte formation is noted at the quadriceps tendon insertion site. Right knee: There is mild medial and patellofemoral compartment joint space narrowing.     Bilateral osteoarthritis of the knees. No acute fracture detected. Small left suprapatellar effusion. Electronically signed by: Noa Cho MD Date:    11/22/2020 Time:    11:32    X-ray Ankle Complete Left    Result Date: 11/22/2020  EXAMINATION: XR ANKLE COMPLETE 3 VIEW LEFT CLINICAL HISTORY: Unspecified fall, initial encounter TECHNIQUE: AP, lateral and oblique views of the left ankle were performed. COMPARISON: None FINDINGS: There is a linear lucency at the lateral malleolus consistent with nondisplaced fracture extending to the talofibular joint.  There is also distal fragmentation of the lateral malleolus consistent with age-indeterminate avulsion fracture which is favored to be remote.  There is overlying soft tissue swelling.     1. Acute nondisplaced fracture of the lateral malleolus extending to the talofibular joint. 2. Additional  fragmentation at the distal tip of the lateral malleolus consistent with age indeterminate avulsion fracture, favored to be remote. Electronically signed by: Noa Cho MD Date:    11/22/2020 Time:    11:35    Assessment:       1. Fall, initial encounter    2. Acute pain of left knee    3. Right wrist pain    4. Closed nondisplaced fracture of lateral malleolus of left fibula, initial encounter        Plan:         Fall, initial encounter  -     X-Ray Knee Complete 4 or More Views Left; Future; Expected date: 11/22/2020  -     X-Ray Ankle Complete Left; Future; Expected date: 11/22/2020  -     X-Ray Wrist Complete Right; Future; Expected date: 11/22/2020    Acute pain of left knee    Right wrist pain    Closed nondisplaced fracture of lateral malleolus of left fibula, initial encounter  -     Cancel: Ambulatory referral/consult to Orthopedics  -     NON-PNEUMATIC WALKING BOOT FOR HOME USE  -     Ambulatory referral/consult to Orthopedics      Patient Instructions   Rest, ice, compression, and elevation    Alternate tylenol and ibuprofen for pain.     Follow up with orthopedic. Referral sent    You must understand that you've received an Urgent Care treatment only and that you may be released before all your medical problems are known or treated. You, the patient, will arrange for follow up care as instructed.  Follow up with your PCP or specialty clinic as directed in the next 1-2 weeks if not improved or as needed.  You can call [phone] to schedule an appointment with the appropriate provider.  If your condition worsens we recommend that you receive another evaluation at the emergency room immediately or contact your primary medical clinics after hours call service to discuss your concerns.  Please return here or go to the Emergency Department for any concerns or worsening of condition.        Ankle Fracture, Distal Fibula  You have a fracture, or broken bone, of the end of the fibula bone. The fibula  is one of two bones that support the ankle joint.    Home care  · You will be given a splint, cast, or special boot to prevent movement at the injury site. Do not put weight on a splint. It will break. Follow your healthcare providers advice about when to begin bearing weight on a cast or boot.  · Keep your leg elevated when sitting or lying down. When sleeping, place a pillow under the injured leg. When sitting, support the injured leg so it is level with your waist. This is very important during the first 48 hours.  · Keep the cast or splint completely dry at all times. When bathing, protect the cast or splint with 2 large plastic bags. Place 1 bag outside of the other. Tape each bag with duct tape at the top end. Water can still leak in even when the foot is covered. So it's best to keep the cast, splint, or boot away from water. If a fiberglass cast or splint gets wet, dry it with a hair dryer on a cool setting.  · Place an ice pack over the injured area for no more than 15 to 20 minutes. Do this every 3 to 6 hours for the first 24 to 48 hours. Continue this 3 to 4 times a day as needed. To make an ice pack, put ice cubes in a plastic bag that seals at the top. Wrap the bag in a clean, thin towel or cloth. Never put ice or an ice pack directly on the skin. The ice pack can be put right on the cast or splint. As the ice melts, be careful that the cast or splint doesnt get wet.  · You may use over-the-counter pain medicine to control pain, unless another pain medicine was prescribed. If you have chronic liver or kidney disease or ever had a stomach ulcer or GI bleeding, talk with your provider before using these medicines.  Follow-up care  Follow up with your healthcare provider in 1 week, or as advised. This is to be sure the bone is healing properly. If you were given a splint, it may be changed to a cast after the swelling goes down.  If X-rays were taken, you will be told of any new findings that may affect  your care.  When to seek medical advice  Call your healthcare provider right away if any of these occur:  · The plaster cast or splint becomes wet or soft  · The fiberglass cast or splint stays wet for more than 24 hours  · There is increased tightness or pain under the cast or splint  · Your toes become swollen, cold, blue, numb, or tingly  · The cast becomes loose  · The cast has a bad smell  · Sore areas develop under the cast  · The cast develops cracks or breaks   Date Last Reviewed: 11/23/2015  © 7601-4440 [employer]. [address] [place] [address]. All rights reserved. This information is not intended as a substitute for professional medical care. Always follow your healthcare professional's instructions.

## 2020-11-22 NOTE — PATIENT INSTRUCTIONS
Rest, ice, compression, and elevation    Alternate tylenol and ibuprofen for pain.     Follow up with orthopedic. Referral sent    You must understand that you've received an Urgent Care treatment only and that you may be released before all your medical problems are known or treated. You, the patient, will arrange for follow up care as instructed.  Follow up with your PCP or specialty clinic as directed in the next 1-2 weeks if not improved or as needed.  You can call [phone] to schedule an appointment with the appropriate provider.  If your condition worsens we recommend that you receive another evaluation at the emergency room immediately or contact your primary medical clinics after hours call service to discuss your concerns.  Please return here or go to the Emergency Department for any concerns or worsening of condition.        Ankle Fracture, Distal Fibula  You have a fracture, or broken bone, of the end of the fibula bone. The fibula is one of two bones that support the ankle joint.    Home care  · You will be given a splint, cast, or special boot to prevent movement at the injury site. Do not put weight on a splint. It will break. Follow your healthcare providers advice about when to begin bearing weight on a cast or boot.  · Keep your leg elevated when sitting or lying down. When sleeping, place a pillow under the injured leg. When sitting, support the injured leg so it is level with your waist. This is very important during the first 48 hours.  · Keep the cast or splint completely dry at all times. When bathing, protect the cast or splint with 2 large plastic bags. Place 1 bag outside of the other. Tape each bag with duct tape at the top end. Water can still leak in even when the foot is covered. So it's best to keep the cast, splint, or boot away from water. If a fiberglass cast or splint gets wet, dry it with a hair dryer on a cool setting.  · Place an ice pack over the injured area for no more  than 15 to 20 minutes. Do this every 3 to 6 hours for the first 24 to 48 hours. Continue this 3 to 4 times a day as needed. To make an ice pack, put ice cubes in a plastic bag that seals at the top. Wrap the bag in a clean, thin towel or cloth. Never put ice or an ice pack directly on the skin. The ice pack can be put right on the cast or splint. As the ice melts, be careful that the cast or splint doesnt get wet.  · You may use over-the-counter pain medicine to control pain, unless another pain medicine was prescribed. If you have chronic liver or kidney disease or ever had a stomach ulcer or GI bleeding, talk with your provider before using these medicines.  Follow-up care  Follow up with your healthcare provider in 1 week, or as advised. This is to be sure the bone is healing properly. If you were given a splint, it may be changed to a cast after the swelling goes down.  If X-rays were taken, you will be told of any new findings that may affect your care.  When to seek medical advice  Call your healthcare provider right away if any of these occur:  · The plaster cast or splint becomes wet or soft  · The fiberglass cast or splint stays wet for more than 24 hours  · There is increased tightness or pain under the cast or splint  · Your toes become swollen, cold, blue, numb, or tingly  · The cast becomes loose  · The cast has a bad smell  · Sore areas develop under the cast  · The cast develops cracks or breaks   Date Last Reviewed: 11/23/2015 © 2000-2017 [employer]. [address] [place] [address], [place] [address]. All rights reserved. This information is not intended as a substitute for professional medical care. Always follow your healthcare professional's instructions.

## 2020-11-23 ENCOUNTER — OFFICE VISIT (OUTPATIENT)
Dept: ORTHOPEDICS | Facility: CLINIC | Age: 65
End: 2020-11-23
Payer: MEDICARE

## 2020-11-23 ENCOUNTER — PATIENT OUTREACH (OUTPATIENT)
Dept: ADMINISTRATIVE | Facility: OTHER | Age: 65
End: 2020-11-23

## 2020-11-23 ENCOUNTER — HOSPITAL ENCOUNTER (OUTPATIENT)
Dept: RADIOLOGY | Facility: HOSPITAL | Age: 65
Discharge: HOME | End: 2020-11-23
Attending: NURSE PRACTITIONER
Payer: MEDICARE

## 2020-11-23 VITALS — BODY MASS INDEX: 25.2 KG/M2 | WEIGHT: 180 LBS | HEIGHT: 71 IN

## 2020-11-23 DIAGNOSIS — M25.572 LEFT ANKLE PAIN, UNSPECIFIED CHRONICITY: ICD-10-CM

## 2020-11-23 DIAGNOSIS — S82.832A CLOSED FRACTURE OF DISTAL END OF LEFT FIBULA, UNSPECIFIED FRACTURE MORPHOLOGY, INITIAL ENCOUNTER: Primary | ICD-10-CM

## 2020-11-23 PROCEDURE — 99999 PR PBB SHADOW E&M-EST. PATIENT-LVL III: ICD-10-PCS | Mod: PBBFAC,,, | Performed by: NURSE PRACTITIONER

## 2020-11-23 PROCEDURE — 99213 OFFICE O/P EST LOW 20 MIN: CPT | Mod: PBBFAC,25 | Performed by: NURSE PRACTITIONER

## 2020-11-23 PROCEDURE — 73610 X-RAY EXAM OF ANKLE: CPT | Mod: TC,LT

## 2020-11-23 PROCEDURE — 73610 XR ANKLE COMPLETE 3 VIEW LEFT: ICD-10-PCS | Mod: 26,LT,, | Performed by: RADIOLOGY

## 2020-11-23 PROCEDURE — 73610 X-RAY EXAM OF ANKLE: CPT | Mod: 26,LT,, | Performed by: RADIOLOGY

## 2020-11-23 PROCEDURE — 99999 PR PBB SHADOW E&M-EST. PATIENT-LVL III: CPT | Mod: PBBFAC,,, | Performed by: NURSE PRACTITIONER

## 2020-11-23 PROCEDURE — 99214 OFFICE O/P EST MOD 30 MIN: CPT | Mod: S$PBB,,, | Performed by: NURSE PRACTITIONER

## 2020-11-23 PROCEDURE — 99214 PR OFFICE/OUTPT VISIT, EST, LEVL IV, 30-39 MIN: ICD-10-PCS | Mod: S$PBB,,, | Performed by: NURSE PRACTITIONER

## 2020-11-23 NOTE — PROGRESS NOTES
SUBJECTIVE:     Chief Complaint & History of Present Illness:  Arben De Luna is a New 65 y.o. year old female patient here for intermittent left foot/ankle pain which has been present for 2 days.  There is a history of injury.  She reports she twisted her left ankle twice in the last 2 months and then tripped over a threshold at her daughter's home on Saturday.  She went to urgent care the next day and learned she had a fracture.  She was placed into a cam boot and told to follow up with Orthopedics.  The pain is located in the lateral and base of her of the foot/ankle.  The pain is described as achy.  It is aggravated by prolonged activity.  There is not radiation, numbness or tingling into the toes.  Associated symptoms include worsens with activity. Previous treatments include rest, OTC analgesics Motrin and Tylenol and brace which have provided adequate relief.  There is a history of previous injury or surgery to the foot.   The patient does not use an assistive device.    Review of patient's allergies indicates:  No Known Allergies      Current Outpatient Medications   Medication Sig Dispense Refill    azelastine (ASTELIN) 137 mcg (0.1 %) nasal spray 1 spray (137 mcg total) by Nasal route 2 (two) times daily. 30 mL 3    famotidine (PEPCID) 10 MG tablet Take 1 tablet (10 mg total) by mouth 2 (two) times daily. 60 tablet 0    fluticasone propionate (FLONASE) 50 mcg/actuation nasal spray 2 sprays (100 mcg total) by Each Nostril route once daily. 15.8 mL 0    lidocaine HCL 2% (XYLOCAINE) 2 % jelly Apply topically as needed. Apply topically once nightly to affected part of foot/feet. 30 mL 2    naproxen (NAPROSYN) 500 MG tablet Take 1 tablet (500 mg total) by mouth 2 (two) times daily with meals. 30 tablet 1    venlafaxine (EFFEXOR-XR) 150 MG Cp24 TAKE 1 CAPSULE (150 MG TOTAL) BY MOUTH ONCE DAILY. 90 capsule 3    EPINEPHrine (EPIPEN 2-RIMA) 0.3 mg/0.3 mL AtIn Inject 0.3 mLs (0.3 mg total) into the muscle once.  "May repeat does once in 20 min if needed the for 1 dose 1 Device 5     No current facility-administered medications for this visit.        Past Medical History:   Diagnosis Date    Actinic keratoses     Arthritis     right knee     Cherry angioma     Depression     History of colonic polyps - adenomatous polyps     1 tub adenoma on cscope 2018    Osteopenia     Plantar fasciitis     Seborrheic keratoses     Squamous cell carcinoma in situ     right arm       History reviewed. No pertinent surgical history.    Family History   Problem Relation Age of Onset    Heart disease Mother     Stroke Mother     Hyperlipidemia Mother     Hypertension Mother     Cancer Father         throat CA, + tobacco    Depression Sister         bipolar depression    ALS Brother     No Known Problems Daughter     No Known Problems Son     Cancer Sister         ? uterine    No Known Problems Sister     No Known Problems Sister     No Known Problems Sister     Other Sister         colitis    Cancer Brother         prostate CA     No Known Problems Brother     No Known Problems Brother     No Known Problems Brother     Melanoma Neg Hx          Review of Systems:  ROS:  Constitutional: no fever or chills  Eyes: no visual changes  ENT: no nasal congestion or sore throat  Respiratory: no cough or shortness of breath  Cardiovascular: no chest pain or palpitations  Gastrointestinal: no nausea or vomiting, tolerating diet  Genitourinary: no hematuria or dysuria  Integument/Breast: no rash or pruritis  Hematologic/Lymphatic: no easy bruising or lymphadenopathy  Musculoskeletal: left ankle injury  Neurological: no seizures or tremors  Behavioral/Psych: no auditory or visual hallucinations  Endocrine: no heat or cold intolerance      OBJECTIVE:     PHYSICAL EXAM:  Vital Signs (Most Recent)  There were no vitals filed for this visit.  Height: 5' 11" (180.3 cm) Weight: 81.6 kg (180 lb),   General Appearance: Well nourished, " well developed, in no acute distress.  HENT: Normal cephalic, oropharynx pink and moist  Eyes: PERRLA bilaterally and EOM x 4  Respiratory: Even and unlabored  Skin: Warm and Dry.   Psychiatric: AAO x 4, Mood & affect are normal.    left  Foot/Ankle    General appearance: no acute distress, alert/oriented x3, appropriate mood and affect, looks stated age and well nourished  The examination was performed out of splint/cast  Skin: normal  Swelling: none  Warmth: no warmth  Tenderness: diffuse  ROM: 10 degrees dorsiflexion, 15 degrees plantarflexion, 10 degrees inversion and 10 degrees eversion  Strength: normal  Gait: normal  Stability: stable to testing and Cotton test: negative  Crepitus: no  Neurological Exam: normal  Vascular Exam: normal and pulse present    mild tenderness to lateral aspect of left ankle      RADIOGRAPHS:  X-ray of left ankle obtained and personally reviewed by me.  She has a distal lateral fibula fracture that appears old.  Fracture is stable.  There is no ankle widening.  Ankle mortise is symmetrical.    ASSESSMENT/PLAN:       ICD-10-CM ICD-9-CM   1. Closed fracture of distal end of left fibula, unspecified fracture morphology, initial encounter  S82.832A 824.8       Plan:  -Arben De Luna presents to clinic today with c/c left distal fibula fracture secondary to trip and fall on 11/21/2020.  -X-ray as above.  -Recommend RICE therapy.  -Will keep patient in a CAM boot for now and have her come back in 2 weeks with repeat left ankle x-ray, standing out of splint.  If fracture is stable, will allow her to go into an ankle sleeve and start PT.  -Continue Tylenol and/or Motrin PRN.  -Call for questions.

## 2020-11-23 NOTE — LETTER
November 23, 2020      Mireya Guadalupe NP  [address] [facility] [address]           [facility] - Orthopedics 5th Fl  [address]  [facility] [address]  Phone: [phone]          Patient: Arben De Luna   MR Number: [id number]   YOB: 1955   Date of Visit: 11/23/2020       Dear Mireya Guadalupe:    Thank you for referring Arben De Luna to me for evaluation. Attached you will find relevant portions of my assessment and plan of care.    If you have questions, please do not hesitate to call me. I look forward to following Arben De Luna along with you.    Sincerely,    Marcial Boone NP    Enclosure  CC:  No Recipients    If you would like to receive this communication electronically, please contact [email or web address] or [phone] to request more information on [facility] Link access.    For providers and/or their staff who would like to refer a patient to [facility], please contact us through our one-stop-shop provider referral line, [facility], at [phone].    If you feel you have received this communication in error or would no longer like to receive these types of communications, please e-mail [email or web address]

## 2020-12-09 ENCOUNTER — TELEPHONE (OUTPATIENT)
Dept: ORTHOPEDICS | Facility: CLINIC | Age: 65
End: 2020-12-09

## 2020-12-10 ENCOUNTER — OFFICE VISIT (OUTPATIENT)
Dept: ORTHOPEDICS | Facility: CLINIC | Age: 65
End: 2020-12-10
Payer: MEDICARE

## 2020-12-10 ENCOUNTER — HOSPITAL ENCOUNTER (OUTPATIENT)
Dept: RADIOLOGY | Facility: HOSPITAL | Age: 65
Discharge: HOME | End: 2020-12-10
Attending: NURSE PRACTITIONER
Payer: MEDICARE

## 2020-12-10 VITALS — HEIGHT: 71 IN | BODY MASS INDEX: 25.18 KG/M2 | WEIGHT: 179.88 LBS

## 2020-12-10 DIAGNOSIS — S82.832D CLOSED FRACTURE OF DISTAL END OF LEFT FIBULA WITH ROUTINE HEALING, UNSPECIFIED FRACTURE MORPHOLOGY, SUBSEQUENT ENCOUNTER: ICD-10-CM

## 2020-12-10 DIAGNOSIS — S82.832D CLOSED FRACTURE OF DISTAL END OF LEFT FIBULA WITH ROUTINE HEALING, UNSPECIFIED FRACTURE MORPHOLOGY, SUBSEQUENT ENCOUNTER: Primary | ICD-10-CM

## 2020-12-10 PROCEDURE — 73610 X-RAY EXAM OF ANKLE: CPT | Mod: 26,LT,, | Performed by: RADIOLOGY

## 2020-12-10 PROCEDURE — 99213 OFFICE O/P EST LOW 20 MIN: CPT | Mod: S$PBB,,, | Performed by: NURSE PRACTITIONER

## 2020-12-10 PROCEDURE — 99999 PR PBB SHADOW E&M-EST. PATIENT-LVL III: ICD-10-PCS | Mod: PBBFAC,,, | Performed by: NURSE PRACTITIONER

## 2020-12-10 PROCEDURE — 73610 X-RAY EXAM OF ANKLE: CPT | Mod: TC,LT

## 2020-12-10 PROCEDURE — 73610 XR ANKLE COMPLETE 3 VIEW LEFT: ICD-10-PCS | Mod: 26,LT,, | Performed by: RADIOLOGY

## 2020-12-10 PROCEDURE — 99213 PR OFFICE/OUTPT VISIT, EST, LEVL III, 20-29 MIN: ICD-10-PCS | Mod: S$PBB,,, | Performed by: NURSE PRACTITIONER

## 2020-12-10 PROCEDURE — 99213 OFFICE O/P EST LOW 20 MIN: CPT | Mod: PBBFAC,25 | Performed by: NURSE PRACTITIONER

## 2020-12-10 PROCEDURE — 99999 PR PBB SHADOW E&M-EST. PATIENT-LVL III: CPT | Mod: PBBFAC,,, | Performed by: NURSE PRACTITIONER

## 2020-12-10 NOTE — PROGRESS NOTES
SUBJECTIVE:     Chief Complaint & History of Present Illness:  Arben De Luna is a Established 65 y.o. year old female patient here for follow up for her left distal fibula fracture.  She was previously seen by me on 11/23/20.  At time she was placed into a cam boot, allowed to weight bear as tolerated and told to follow up in 2 weeks.    She returns today, reports she has no pain.  Reports intermittent lateral ankle swelling.  Denies falls or injuries, no numbness or tingling sensation.  She is not taking anything for pain currently.  She is able to bear weight without assistance.      Review of patient's allergies indicates:  No Known Allergies      Current Outpatient Medications   Medication Sig Dispense Refill    azelastine (ASTELIN) 137 mcg (0.1 %) nasal spray 1 spray (137 mcg total) by Nasal route 2 (two) times daily. 30 mL 3    EPINEPHrine (EPIPEN 2-RIMA) 0.3 mg/0.3 mL AtIn Inject 0.3 mLs (0.3 mg total) into the muscle once. May repeat does once in 20 min if needed the for 1 dose 1 Device 5    famotidine (PEPCID) 10 MG tablet Take 1 tablet (10 mg total) by mouth 2 (two) times daily. 60 tablet 0    fluticasone propionate (FLONASE) 50 mcg/actuation nasal spray 2 sprays (100 mcg total) by Each Nostril route once daily. 15.8 mL 0    lidocaine HCL 2% (XYLOCAINE) 2 % jelly Apply topically as needed. Apply topically once nightly to affected part of foot/feet. 30 mL 2    naproxen (NAPROSYN) 500 MG tablet Take 1 tablet (500 mg total) by mouth 2 (two) times daily with meals. 30 tablet 1    venlafaxine (EFFEXOR-XR) 150 MG Cp24 TAKE 1 CAPSULE (150 MG TOTAL) BY MOUTH ONCE DAILY. 90 capsule 3     No current facility-administered medications for this visit.        Past Medical History:   Diagnosis Date    Actinic keratoses     Arthritis     right knee     Cherry angioma     Depression     History of colonic polyps - adenomatous polyps     1 tub adenoma on cscope 2018    Osteopenia     Plantar fasciitis      Seborrheic keratoses     Squamous cell carcinoma in situ     right arm       No past surgical history on file.    Family History   Problem Relation Age of Onset    Heart disease Mother     Stroke Mother     Hyperlipidemia Mother     Hypertension Mother     Cancer Father         throat CA, + tobacco    Depression Sister         bipolar depression    ALS Brother     No Known Problems Daughter     No Known Problems Son     Cancer Sister         ? uterine    No Known Problems Sister     No Known Problems Sister     No Known Problems Sister     Other Sister         colitis    Cancer Brother         prostate CA     No Known Problems Brother     No Known Problems Brother     No Known Problems Brother     Melanoma Neg Hx          Review of Systems:  ROS:  Constitutional: no fever or chills  Eyes: no visual changes  ENT: no nasal congestion or sore throat  Respiratory: no cough or shortness of breath  Cardiovascular: no chest pain or palpitations  Gastrointestinal: no nausea or vomiting, tolerating diet  Genitourinary: no hematuria or dysuria  Integument/Breast: no rash or pruritis  Hematologic/Lymphatic: no easy bruising or lymphadenopathy  Musculoskeletal: left distal fibula fracture  Neurological: no seizures or tremors  Behavioral/Psych: no auditory or visual hallucinations  Endocrine: no heat or cold intolerance      OBJECTIVE:     PHYSICAL EXAM:  Vital Signs (Most Recent)  There were no vitals filed for this visit.     ,   General Appearance: Well nourished, well developed, in no acute distress.  HENT: Normal cephalic, oropharynx pink and moist  Eyes: PERRLA bilaterally and EOM x 4  Respiratory: Even and unlabored  Skin: Warm and Dry.   Psychiatric: AAO x 4, Mood & affect are normal.    left  Foot/Ankle    General appearance: no acute distress, alert/oriented x3, appropriate mood and affect, looks stated age and well nourished  The examination was performed out of splint/cast  Skin:  normal  Swelling: none  Warmth: no warmth  Tenderness: none  ROM: 15 degrees dorsiflexion, 15 degrees plantarflexion, 20 degrees inversion and 20 degrees eversion  Strength: normal  Gait: normal  Stability: stable to testing and Cotton test: negative  Crepitus: no  Neurological Exam: normal  Vascular Exam: normal and pulse present    normal exam, no swelling, tenderness, instability; ligaments intact, full range of motion of all ankle/foot joints      RADIOGRAPHS:  X-ray of left ankle obtained and personally reviewed by me.  She has a distal lateral fibula fracture that appears stable.  There is no ankle widening.  Ankle mortise is symmetrical.    ASSESSMENT/PLAN:       ICD-10-CM ICD-9-CM   1. Closed fracture of distal end of left fibula with routine healing, unspecified fracture morphology, subsequent encounter  S82.832D V54.16       Plan:  -Arben De Luna presents to clinic today with c/c left distal fibula fracture secondary to trip and fall on 11/21/2020.  -X-ray as above.  -Recommend RICE therapy.  -Advised can come out of CAM boot and transition to ankle support sleeve for a couple of weeks.  -Avoid high impact activities.  -Offered therapy but wants to wait.  -Follow up PRN.  -Call for questions.

## 2020-12-10 NOTE — LETTER
December 10, 2020      Joanie To MD  [address]  [facility] [address]           [facility] 5th Fl  [address]  [facility] [address]  Phone: [phone]          Patient: Arben De Luna   MR Number: [id number]   YOB: 1955   Date of Visit: 12/10/2020       Dear Dr. Joanie To:    Thank you for referring Arben De Luna to me for evaluation. Attached you will find relevant portions of my assessment and plan of care.    If you have questions, please do not hesitate to call me. I look forward to following Arben De Luna along with you.    Sincerely,    Marcial Boone, ASHLEY    Enclosure  CC:  No Recipients    If you would like to receive this communication electronically, please contact [email or web address] or [phone] to request more information on [employer] Link access.    For providers and/or their staff who would like to refer a patient to [facility], please contact us through our one-stop-shop provider referral line, [facility], at [phone].    If you feel you have received this communication in error or would no longer like to receive these types of communications, please e-mail [email or web address]

## 2020-12-18 ENCOUNTER — OFFICE VISIT (OUTPATIENT)
Dept: URGENT CARE | Facility: CLINIC | Age: 65
End: 2020-12-18
Payer: MEDICARE

## 2020-12-18 VITALS
HEART RATE: 74 BPM | DIASTOLIC BLOOD PRESSURE: 83 MMHG | BODY MASS INDEX: 25.06 KG/M2 | WEIGHT: 179 LBS | SYSTOLIC BLOOD PRESSURE: 141 MMHG | HEIGHT: 71 IN | RESPIRATION RATE: 16 BRPM | TEMPERATURE: 97 F | OXYGEN SATURATION: 98 %

## 2020-12-18 DIAGNOSIS — R09.81 SINUS CONGESTION: Primary | ICD-10-CM

## 2020-12-18 LAB
CTP QC/QA: YES
SARS-COV-2 RDRP RESP QL NAA+PROBE: NEGATIVE

## 2020-12-18 PROCEDURE — 99213 PR OFFICE/OUTPT VISIT, EST, LEVL III, 20-29 MIN: ICD-10-PCS | Mod: S$GLB,,, | Performed by: INTERNAL MEDICINE

## 2020-12-18 PROCEDURE — U0002: ICD-10-PCS | Mod: QW,S$GLB,, | Performed by: INTERNAL MEDICINE

## 2020-12-18 PROCEDURE — 99213 OFFICE O/P EST LOW 20 MIN: CPT | Mod: S$GLB,,, | Performed by: INTERNAL MEDICINE

## 2020-12-18 PROCEDURE — U0002 COVID-19 LAB TEST NON-CDC: HCPCS | Mod: QW,S$GLB,, | Performed by: INTERNAL MEDICINE

## 2020-12-18 NOTE — PATIENT INSTRUCTIONS
"Continue using flonase/zyrtec for your symptoms. It is good that it is helping. Sometimes it takes a week to really work. If you develop new symptoms or exposure, please return to be re-tested.     NEGATIVE COVID TEST    You have tested negative for COVID-19 today.  If you did not have a close exposure (as defined below) you can return to your normal daily activities to include social distancing, wearing a mask and frequent handwashing.  A "close exposure" is defined as anyone who has had an exposure (masked or unmasked) to a known COVID -19 positive person within 6 ft for longer than 15 minutes. If your exposure meets this definition, you are required by CDC guidelines to quarantine for at least 7-10 days from time of exposure.    The CDC states that a test can be performed for an asymptomatic patient (someone who does not have any symptoms) after a close exposure, and that a test should be done if you develop symptoms after a close exposure as described above.    Specifically, you can test at day 5 or later if asymptomatic in order to get released from quarantine on day 7 or later.  If you develop symptoms sooner, you should test when your symptoms start.    If you developed symptoms since the exposure, and your test was negative today and less than 5 days from your exposure, you still have to quarantine for 7-10 days from the date of the exposure.    The 7-10 day quarantine begins from the day you were exposed, not the day of your test.  For example, if your exposure was on a Monday, and you waited until Friday of the same week to get tested and it was negative, your 7-10 day quarantine begins from that Monday, not the Friday you tested negative.    Please note, if you decide to test as an asymptomatic during your quarantine and you are positive, you will be restarting your quarantine and moving from a possible 10 day quarantine (if you do not test), to a 11 day or greater quarantine.  "

## 2020-12-18 NOTE — PROGRESS NOTES
"Subjective:       Patient ID: Arben De Luna is a 65 y.o. female.    Vitals:  height is 5' 11" (1.803 m) and weight is 81.2 kg (179 lb). Her temperature is 97 °F (36.1 °C).     Chief Complaint: COVID-19 Concerns    Fatigue  This is a new problem. The current episode started in the past 7 days (2 days). The problem occurs constantly. The problem has been unchanged. Associated symptoms include congestion, fatigue, headaches and a sore throat. Pertinent negatives include no arthralgias, chest pain, chills, coughing, fever, joint swelling, myalgias, nausea, rash, vertigo, vomiting or weakness. Nothing aggravates the symptoms. Treatments tried: decongestion. The treatment provided mild relief.       Constitution: Positive for fatigue. Negative for chills and fever.   HENT: Positive for congestion and sore throat.    Neck: Negative for painful lymph nodes.   Cardiovascular: Negative for chest pain and leg swelling.   Eyes: Negative for double vision and blurred vision.   Respiratory: Negative for cough and shortness of breath.    Gastrointestinal: Negative for nausea, vomiting and diarrhea.   Genitourinary: Negative for dysuria, frequency, urgency and history of kidney stones.   Musculoskeletal: Negative for joint pain, joint swelling, muscle cramps and muscle ache.   Skin: Negative for color change, pale, rash, erythema and bruising.   Allergic/Immunologic: Negative for seasonal allergies.   Neurological: Positive for headaches. Negative for dizziness, history of vertigo, light-headedness and passing out.   Hematologic/Lymphatic: Negative for swollen lymph nodes.   Psychiatric/Behavioral: Negative for nervous/anxious, sleep disturbance and depression. The patient is not nervous/anxious.        Objective:      Physical Exam   Constitutional: She is oriented to person, place, and time. She appears well-developed. No distress.   HENT:   Head: Normocephalic and atraumatic.   Ears:   Right Ear: External ear normal.   Left Ear: " External ear normal.   Nose: Nose normal.   Mouth/Throat: Oropharynx is clear and moist. No oropharyngeal exudate.   Eyes: Pupils are equal, round, and reactive to light. Conjunctivae and EOM are normal. Right eye exhibits no discharge. Left eye exhibits no discharge. No scleral icterus.   Neck: Normal range of motion. Neck supple.   Cardiovascular: Normal rate, regular rhythm and normal heart sounds. Exam reveals no gallop and no friction rub.   No murmur heard.  Pulmonary/Chest: Effort normal. No respiratory distress. She has no wheezes. She has no rales.   Abdominal: Soft. Bowel sounds are normal. She exhibits no distension.   Lymphadenopathy:     She has no cervical adenopathy.   Neurological: She is alert and oriented to person, place, and time.   Skin: Skin is warm, dry, not diaphoretic and no rash. Capillary refill takes less than 2 seconds. erythemaPsychiatric: Her behavior is normal.   Nursing note and vitals reviewed.        Assessment:       1. Sinus congestion        Plan:         Sinus congestion  -     POCT COVID-19 Rapid Screening    You were tested for COVID-19 and tested negative. You had no contact as defined by the CDC with a known positive individual, therefore you are OK to return to work. Please continue practicing good hand hygiene, social distancing, and mask guidelines to help prevent further spread of the Coronavirus.

## 2021-01-04 ENCOUNTER — PATIENT MESSAGE (OUTPATIENT)
Dept: ADMINISTRATIVE | Facility: HOSPITAL | Age: 66
End: 2021-01-04

## 2021-01-05 ENCOUNTER — HOSPITAL ENCOUNTER (OUTPATIENT)
Dept: RADIOLOGY | Facility: OTHER | Age: 66
Discharge: HOME | End: 2021-01-05
Attending: INTERNAL MEDICINE
Payer: MEDICARE

## 2021-01-05 DIAGNOSIS — N95.9 MENOPAUSAL PROBLEM: ICD-10-CM

## 2021-01-05 DIAGNOSIS — Z12.31 ENCOUNTER FOR SCREENING MAMMOGRAM FOR MALIGNANT NEOPLASM OF BREAST: ICD-10-CM

## 2021-01-05 PROCEDURE — 77067 SCR MAMMO BI INCL CAD: CPT | Mod: 26,,, | Performed by: RADIOLOGY

## 2021-01-05 PROCEDURE — 77063 BREAST TOMOSYNTHESIS BI: CPT | Mod: 26,,, | Performed by: RADIOLOGY

## 2021-01-05 PROCEDURE — 77080 DXA BONE DENSITY AXIAL: CPT | Mod: 26,,, | Performed by: RADIOLOGY

## 2021-01-05 PROCEDURE — 77080 DXA BONE DENSITY AXIAL: CPT | Mod: TC

## 2021-01-05 PROCEDURE — 77067 MAMMO DIGITAL SCREENING BILAT WITH TOMO: ICD-10-PCS | Mod: 26,,, | Performed by: RADIOLOGY

## 2021-01-05 PROCEDURE — 77080 DEXA BONE DENSITY SPINE HIP: ICD-10-PCS | Mod: 26,,, | Performed by: RADIOLOGY

## 2021-01-05 PROCEDURE — 77067 SCR MAMMO BI INCL CAD: CPT | Mod: TC

## 2021-01-05 PROCEDURE — 77063 MAMMO DIGITAL SCREENING BILAT WITH TOMO: ICD-10-PCS | Mod: 26,,, | Performed by: RADIOLOGY

## 2021-01-13 ENCOUNTER — PATIENT MESSAGE (OUTPATIENT)
Dept: INTERNAL MEDICINE | Facility: CLINIC | Age: 66
End: 2021-01-13

## 2021-01-13 ENCOUNTER — PATIENT MESSAGE (OUTPATIENT)
Dept: PODIATRY | Facility: CLINIC | Age: 66
End: 2021-01-13

## 2021-01-14 RX ORDER — ALENDRONATE SODIUM 70 MG/1
70 TABLET ORAL
Qty: 12 TABLET | Refills: 3 | Status: SHIPPED | OUTPATIENT
Start: 2021-01-14 | End: 2021-12-15

## 2021-01-15 ENCOUNTER — PATIENT OUTREACH (OUTPATIENT)
Dept: ADMINISTRATIVE | Facility: OTHER | Age: 66
End: 2021-01-15

## 2021-01-15 ENCOUNTER — OFFICE VISIT (OUTPATIENT)
Dept: PODIATRY | Facility: CLINIC | Age: 66
End: 2021-01-15
Payer: MEDICARE

## 2021-01-15 VITALS
BODY MASS INDEX: 25.06 KG/M2 | WEIGHT: 179 LBS | SYSTOLIC BLOOD PRESSURE: 131 MMHG | HEART RATE: 79 BPM | DIASTOLIC BLOOD PRESSURE: 66 MMHG | HEIGHT: 71 IN

## 2021-01-15 DIAGNOSIS — S96.911A STRAIN OF ANKLE AND FOOT, RIGHT, INITIAL ENCOUNTER: ICD-10-CM

## 2021-01-15 DIAGNOSIS — M19.071 ARTHRITIS OF FOOT, RIGHT: Primary | ICD-10-CM

## 2021-01-15 DIAGNOSIS — M24.573 EQUINUS CONTRACTURE OF ANKLE: ICD-10-CM

## 2021-01-15 DIAGNOSIS — M84.374A METATARSAL STRESS FRACTURE, RIGHT, INITIAL ENCOUNTER: ICD-10-CM

## 2021-01-15 PROCEDURE — 99213 OFFICE O/P EST LOW 20 MIN: CPT | Mod: 24,25,S$PBB, | Performed by: PODIATRIST

## 2021-01-15 PROCEDURE — 99213 PR OFFICE/OUTPT VISIT, EST, LEVL III, 20-29 MIN: ICD-10-PCS | Mod: 24,25,S$PBB, | Performed by: PODIATRIST

## 2021-01-15 PROCEDURE — 29540 PR STRAPPING; ANKLE &/OR FOOT: ICD-10-PCS | Mod: 79,S$PBB,RT, | Performed by: PODIATRIST

## 2021-01-15 PROCEDURE — 99999 PR PBB SHADOW E&M-EST. PATIENT-LVL III: ICD-10-PCS | Mod: PBBFAC,,, | Performed by: PODIATRIST

## 2021-01-15 PROCEDURE — 29540 STRAPPING ANKLE &/FOOT: CPT | Mod: 79,S$PBB,RT, | Performed by: PODIATRIST

## 2021-01-15 PROCEDURE — 99213 OFFICE O/P EST LOW 20 MIN: CPT | Mod: PBBFAC,PN,25 | Performed by: PODIATRIST

## 2021-01-15 PROCEDURE — 29540 STRAPPING ANKLE &/FOOT: CPT | Mod: PBBFAC,PN,RT | Performed by: PODIATRIST

## 2021-01-15 PROCEDURE — 99999 PR PBB SHADOW E&M-EST. PATIENT-LVL III: CPT | Mod: PBBFAC,,, | Performed by: PODIATRIST

## 2021-01-16 ENCOUNTER — HOSPITAL ENCOUNTER (OUTPATIENT)
Dept: RADIOLOGY | Facility: OTHER | Age: 66
Discharge: HOME | End: 2021-01-16
Attending: PODIATRIST
Payer: MEDICARE

## 2021-01-16 DIAGNOSIS — M19.071 ARTHRITIS OF FOOT, RIGHT: ICD-10-CM

## 2021-01-16 DIAGNOSIS — M24.573 EQUINUS CONTRACTURE OF ANKLE: ICD-10-CM

## 2021-01-16 PROCEDURE — 73630 X-RAY EXAM OF FOOT: CPT | Mod: 26,RT,, | Performed by: RADIOLOGY

## 2021-01-16 PROCEDURE — 73630 XR FOOT COMPLETE 3 VIEW RIGHT: ICD-10-PCS | Mod: 26,RT,, | Performed by: RADIOLOGY

## 2021-01-16 PROCEDURE — 73630 X-RAY EXAM OF FOOT: CPT | Mod: TC,FY,RT

## 2021-01-29 ENCOUNTER — CLINICAL SUPPORT (OUTPATIENT)
Dept: URGENT CARE | Facility: CLINIC | Age: 66
End: 2021-01-29
Payer: MEDICARE

## 2021-01-29 DIAGNOSIS — Z03.818 ENCOUNTER FOR OBSERVATION FOR SUSPECTED EXPOSURE TO OTHER BIOLOGICAL AGENTS RULED OUT: Primary | ICD-10-CM

## 2021-01-29 LAB
CTP QC/QA: YES
SARS-COV-2 RDRP RESP QL NAA+PROBE: NEGATIVE

## 2021-01-29 PROCEDURE — 99211 OFF/OP EST MAY X REQ PHY/QHP: CPT | Mod: S$GLB,CS,, | Performed by: FAMILY MEDICINE

## 2021-01-29 PROCEDURE — U0002: ICD-10-PCS | Mod: QW,CR,S$GLB, | Performed by: FAMILY MEDICINE

## 2021-01-29 PROCEDURE — U0002 COVID-19 LAB TEST NON-CDC: HCPCS | Mod: QW,CR,S$GLB, | Performed by: FAMILY MEDICINE

## 2021-01-29 PROCEDURE — 99211 PR OFFICE/OUTPT VISIT, EST, LEVL I: ICD-10-PCS | Mod: S$GLB,CS,, | Performed by: FAMILY MEDICINE

## 2021-02-01 ENCOUNTER — PATIENT MESSAGE (OUTPATIENT)
Dept: ORTHOPEDICS | Facility: CLINIC | Age: 66
End: 2021-02-01

## 2021-02-01 DIAGNOSIS — S82.832D CLOSED FRACTURE OF DISTAL END OF LEFT FIBULA WITH ROUTINE HEALING, UNSPECIFIED FRACTURE MORPHOLOGY, SUBSEQUENT ENCOUNTER: Primary | ICD-10-CM

## 2021-02-05 ENCOUNTER — OFFICE VISIT (OUTPATIENT)
Dept: PODIATRY | Facility: CLINIC | Age: 66
End: 2021-02-05
Payer: MEDICARE

## 2021-02-05 VITALS
HEART RATE: 67 BPM | BODY MASS INDEX: 25.06 KG/M2 | HEIGHT: 71 IN | DIASTOLIC BLOOD PRESSURE: 67 MMHG | SYSTOLIC BLOOD PRESSURE: 137 MMHG | WEIGHT: 179 LBS

## 2021-02-05 DIAGNOSIS — M84.374A METATARSAL STRESS FRACTURE, RIGHT, INITIAL ENCOUNTER: ICD-10-CM

## 2021-02-05 DIAGNOSIS — M24.573 EQUINUS CONTRACTURE OF ANKLE: ICD-10-CM

## 2021-02-05 DIAGNOSIS — M19.071 ARTHRITIS OF FOOT, RIGHT: Primary | ICD-10-CM

## 2021-02-05 PROCEDURE — 99999 PR PBB SHADOW E&M-EST. PATIENT-LVL III: CPT | Mod: PBBFAC,,, | Performed by: PODIATRIST

## 2021-02-05 PROCEDURE — 99213 OFFICE O/P EST LOW 20 MIN: CPT | Mod: PBBFAC,PN | Performed by: PODIATRIST

## 2021-02-05 PROCEDURE — 99999 PR PBB SHADOW E&M-EST. PATIENT-LVL III: ICD-10-PCS | Mod: PBBFAC,,, | Performed by: PODIATRIST

## 2021-02-05 PROCEDURE — 99213 PR OFFICE/OUTPT VISIT, EST, LEVL III, 20-29 MIN: ICD-10-PCS | Mod: S$PBB,,, | Performed by: PODIATRIST

## 2021-02-05 PROCEDURE — 99213 OFFICE O/P EST LOW 20 MIN: CPT | Mod: S$PBB,,, | Performed by: PODIATRIST

## 2021-02-06 ENCOUNTER — PATIENT MESSAGE (OUTPATIENT)
Dept: INTERNAL MEDICINE | Facility: CLINIC | Age: 66
End: 2021-02-06

## 2021-02-08 ENCOUNTER — HOSPITAL ENCOUNTER (OUTPATIENT)
Dept: RADIOLOGY | Facility: OTHER | Age: 66
Discharge: HOME | End: 2021-02-08
Attending: PODIATRIST
Payer: MEDICARE

## 2021-02-08 DIAGNOSIS — M24.573 EQUINUS CONTRACTURE OF ANKLE: ICD-10-CM

## 2021-02-08 DIAGNOSIS — M84.374A METATARSAL STRESS FRACTURE, RIGHT, INITIAL ENCOUNTER: ICD-10-CM

## 2021-02-08 DIAGNOSIS — M19.071 ARTHRITIS OF FOOT, RIGHT: ICD-10-CM

## 2021-02-08 PROCEDURE — 73630 XR FOOT COMPLETE 3 VIEW RIGHT: ICD-10-PCS | Mod: 26,RT,, | Performed by: RADIOLOGY

## 2021-02-08 PROCEDURE — 73630 X-RAY EXAM OF FOOT: CPT | Mod: TC,FY,RT

## 2021-02-08 PROCEDURE — 73630 X-RAY EXAM OF FOOT: CPT | Mod: 26,RT,, | Performed by: RADIOLOGY

## 2021-02-13 ENCOUNTER — PATIENT MESSAGE (OUTPATIENT)
Dept: PODIATRY | Facility: CLINIC | Age: 66
End: 2021-02-13

## 2021-02-23 ENCOUNTER — IMMUNIZATION (OUTPATIENT)
Dept: PHARMACY | Facility: CLINIC | Age: 66
End: 2021-02-23
Payer: MEDICARE

## 2021-02-23 DIAGNOSIS — Z23 NEED FOR VACCINATION: Primary | ICD-10-CM

## 2021-02-24 ENCOUNTER — CLINICAL SUPPORT (OUTPATIENT)
Dept: REHABILITATION | Facility: OTHER | Age: 66
End: 2021-02-24
Attending: PODIATRIST
Payer: MEDICARE

## 2021-02-24 DIAGNOSIS — M84.374A METATARSAL STRESS FRACTURE, RIGHT, INITIAL ENCOUNTER: ICD-10-CM

## 2021-02-24 DIAGNOSIS — M25.672 DECREASED RANGE OF MOTION OF BOTH ANKLES: ICD-10-CM

## 2021-02-24 DIAGNOSIS — M25.671 DECREASED RANGE OF MOTION OF BOTH ANKLES: ICD-10-CM

## 2021-02-24 DIAGNOSIS — M19.071 ARTHRITIS OF FOOT, RIGHT: ICD-10-CM

## 2021-02-24 DIAGNOSIS — M24.573 EQUINUS CONTRACTURE OF ANKLE: ICD-10-CM

## 2021-02-24 PROBLEM — M25.661 JOINT STIFFNESS OF KNEE, RIGHT: Status: RESOLVED | Noted: 2019-02-26 | Resolved: 2021-02-24

## 2021-02-24 PROBLEM — M25.561 ACUTE PAIN OF RIGHT KNEE: Status: RESOLVED | Noted: 2019-02-26 | Resolved: 2021-02-24

## 2021-02-24 PROCEDURE — 97161 PT EVAL LOW COMPLEX 20 MIN: CPT | Mod: PN

## 2021-02-24 PROCEDURE — 97110 THERAPEUTIC EXERCISES: CPT | Mod: PN

## 2021-03-23 ENCOUNTER — IMMUNIZATION (OUTPATIENT)
Dept: PHARMACY | Facility: CLINIC | Age: 66
End: 2021-03-23
Payer: MEDICARE

## 2021-03-23 DIAGNOSIS — Z23 NEED FOR VACCINATION: Primary | ICD-10-CM

## 2021-04-15 ENCOUNTER — PATIENT OUTREACH (OUTPATIENT)
Dept: ADMINISTRATIVE | Facility: OTHER | Age: 66
End: 2021-04-15

## 2021-04-16 ENCOUNTER — PATIENT MESSAGE (OUTPATIENT)
Dept: DERMATOLOGY | Facility: CLINIC | Age: 66
End: 2021-04-16

## 2021-04-19 ENCOUNTER — OFFICE VISIT (OUTPATIENT)
Dept: DERMATOLOGY | Facility: CLINIC | Age: 66
End: 2021-04-19
Payer: MEDICARE

## 2021-04-19 DIAGNOSIS — L81.4 LENTIGINES: ICD-10-CM

## 2021-04-19 DIAGNOSIS — L90.5 SCAR: ICD-10-CM

## 2021-04-19 DIAGNOSIS — Z85.828 HISTORY OF NONMELANOMA SKIN CANCER: ICD-10-CM

## 2021-04-19 DIAGNOSIS — L21.9 SEBORRHEIC DERMATITIS: ICD-10-CM

## 2021-04-19 DIAGNOSIS — D18.01 ANGIOMA OF SKIN: ICD-10-CM

## 2021-04-19 DIAGNOSIS — L57.0 ACTINIC KERATOSIS: Primary | ICD-10-CM

## 2021-04-19 DIAGNOSIS — L82.1 SEBORRHEIC KERATOSIS: ICD-10-CM

## 2021-04-19 PROCEDURE — 17000 DESTRUCT PREMALG LESION: CPT | Mod: S$GLB,,, | Performed by: DERMATOLOGY

## 2021-04-19 PROCEDURE — 17003 DESTRUCTION, PREMALIGNANT LESIONS; SECOND THROUGH 14 LESIONS: ICD-10-PCS | Mod: S$GLB,,, | Performed by: DERMATOLOGY

## 2021-04-19 PROCEDURE — 99214 PR OFFICE/OUTPT VISIT, EST, LEVL IV, 30-39 MIN: ICD-10-PCS | Mod: 25,S$GLB,, | Performed by: DERMATOLOGY

## 2021-04-19 PROCEDURE — 17000 PR DESTRUCTION(LASER SURGERY,CRYOSURGERY,CHEMOSURGERY),PREMALIGNANT LESIONS,FIRST LESION: ICD-10-PCS | Mod: S$GLB,,, | Performed by: DERMATOLOGY

## 2021-04-19 PROCEDURE — 99214 OFFICE O/P EST MOD 30 MIN: CPT | Mod: 25,S$GLB,, | Performed by: DERMATOLOGY

## 2021-04-19 PROCEDURE — 17003 DESTRUCT PREMALG LES 2-14: CPT | Mod: S$GLB,,, | Performed by: DERMATOLOGY

## 2021-04-19 RX ORDER — CICLOPIROX 1 G/100ML
SHAMPOO TOPICAL
Qty: 240 ML | Refills: 5 | Status: SHIPPED | OUTPATIENT
Start: 2021-04-19 | End: 2021-12-15

## 2021-05-11 ENCOUNTER — APPOINTMENT (OUTPATIENT)
Dept: RADIOLOGY | Facility: OTHER | Age: 66
End: 2021-05-11
Attending: PODIATRIST
Payer: MEDICARE

## 2021-05-11 ENCOUNTER — OFFICE VISIT (OUTPATIENT)
Dept: PODIATRY | Facility: CLINIC | Age: 66
End: 2021-05-11
Payer: MEDICARE

## 2021-05-11 VITALS
BODY MASS INDEX: 25.06 KG/M2 | WEIGHT: 179 LBS | HEART RATE: 69 BPM | SYSTOLIC BLOOD PRESSURE: 151 MMHG | HEIGHT: 71 IN | DIASTOLIC BLOOD PRESSURE: 99 MMHG

## 2021-05-11 DIAGNOSIS — S90.31XA CONTUSION OF RIGHT FOOT, INITIAL ENCOUNTER: ICD-10-CM

## 2021-05-11 DIAGNOSIS — S90.01XA CONTUSION OF RIGHT ANKLE, INITIAL ENCOUNTER: ICD-10-CM

## 2021-05-11 DIAGNOSIS — S90.31XA CONTUSION OF RIGHT FOOT, INITIAL ENCOUNTER: Primary | ICD-10-CM

## 2021-05-11 PROCEDURE — 73610 X-RAY EXAM OF ANKLE: CPT | Mod: TC,RT

## 2021-05-11 PROCEDURE — 99999 PR PBB SHADOW E&M-EST. PATIENT-LVL III: ICD-10-PCS | Mod: PBBFAC,,, | Performed by: PODIATRIST

## 2021-05-11 PROCEDURE — 99214 PR OFFICE/OUTPT VISIT, EST, LEVL IV, 30-39 MIN: ICD-10-PCS | Mod: S$PBB,,, | Performed by: PODIATRIST

## 2021-05-11 PROCEDURE — 99213 OFFICE O/P EST LOW 20 MIN: CPT | Mod: PBBFAC,PN,25 | Performed by: PODIATRIST

## 2021-05-11 PROCEDURE — 99999 PR PBB SHADOW E&M-EST. PATIENT-LVL III: CPT | Mod: PBBFAC,,, | Performed by: PODIATRIST

## 2021-05-11 PROCEDURE — 73610 XR ANKLE COMPLETE 3 VIEW RIGHT: ICD-10-PCS | Mod: 26,RT,, | Performed by: RADIOLOGY

## 2021-05-11 PROCEDURE — 99214 OFFICE O/P EST MOD 30 MIN: CPT | Mod: S$PBB,,, | Performed by: PODIATRIST

## 2021-05-11 PROCEDURE — 73610 X-RAY EXAM OF ANKLE: CPT | Mod: 26,RT,, | Performed by: RADIOLOGY

## 2021-05-11 RX ORDER — MELOXICAM 15 MG/1
15 TABLET ORAL DAILY
Qty: 30 TABLET | Refills: 0 | Status: SHIPPED | OUTPATIENT
Start: 2021-05-11 | End: 2021-12-15

## 2021-05-11 RX ORDER — LIDOCAINE HYDROCHLORIDE 20 MG/ML
JELLY TOPICAL
Qty: 30 ML | Refills: 2 | Status: SHIPPED | OUTPATIENT
Start: 2021-05-11 | End: 2021-12-15

## 2021-09-17 ENCOUNTER — CLINICAL SUPPORT (OUTPATIENT)
Dept: URGENT CARE | Facility: CLINIC | Age: 66
End: 2021-09-17
Payer: MEDICARE

## 2021-09-17 DIAGNOSIS — Z11.59 SCREENING FOR VIRAL DISEASE: Primary | ICD-10-CM

## 2021-09-17 LAB
CTP QC/QA: YES
SARS-COV-2 RDRP RESP QL NAA+PROBE: NEGATIVE

## 2021-09-17 PROCEDURE — U0002 COVID-19 LAB TEST NON-CDC: HCPCS | Mod: QW,CR,S$GLB, | Performed by: PHYSICIAN ASSISTANT

## 2021-09-17 PROCEDURE — U0002: ICD-10-PCS | Mod: QW,CR,S$GLB, | Performed by: PHYSICIAN ASSISTANT

## 2021-09-26 ENCOUNTER — CLINICAL SUPPORT (OUTPATIENT)
Dept: URGENT CARE | Facility: CLINIC | Age: 66
End: 2021-09-26
Payer: MEDICARE

## 2021-09-26 DIAGNOSIS — Z11.59 SCREENING FOR VIRAL DISEASE: Primary | ICD-10-CM

## 2021-09-26 LAB
CTP QC/QA: YES
SARS-COV-2 RDRP RESP QL NAA+PROBE: NEGATIVE

## 2021-09-26 PROCEDURE — U0002 COVID-19 LAB TEST NON-CDC: HCPCS | Mod: QW,CR,S$GLB, | Performed by: NURSE PRACTITIONER

## 2021-09-26 PROCEDURE — 99211 OFF/OP EST MAY X REQ PHY/QHP: CPT | Mod: S$GLB,CS,, | Performed by: NURSE PRACTITIONER

## 2021-09-26 PROCEDURE — U0002: ICD-10-PCS | Mod: QW,CR,S$GLB, | Performed by: NURSE PRACTITIONER

## 2021-09-26 PROCEDURE — 99211 PR OFFICE/OUTPT VISIT, EST, LEVL I: ICD-10-PCS | Mod: S$GLB,CS,, | Performed by: NURSE PRACTITIONER

## 2021-10-11 ENCOUNTER — CLINICAL SUPPORT (OUTPATIENT)
Dept: URGENT CARE | Facility: CLINIC | Age: 66
End: 2021-10-11
Payer: MEDICARE

## 2021-10-11 DIAGNOSIS — Z11.9 SCREENING EXAMINATION FOR UNSPECIFIED INFECTIOUS DISEASE: Primary | ICD-10-CM

## 2021-10-11 LAB
CTP QC/QA: YES
SARS-COV-2 RDRP RESP QL NAA+PROBE: NEGATIVE

## 2021-10-11 PROCEDURE — U0002: ICD-10-PCS | Mod: QW,CR,S$GLB, | Performed by: INTERNAL MEDICINE

## 2021-10-11 PROCEDURE — U0002 COVID-19 LAB TEST NON-CDC: HCPCS | Mod: QW,CR,S$GLB, | Performed by: INTERNAL MEDICINE

## 2021-10-11 PROCEDURE — 99211 PR OFFICE/OUTPT VISIT, EST, LEVL I: ICD-10-PCS | Mod: S$GLB,CS,, | Performed by: INTERNAL MEDICINE

## 2021-10-11 PROCEDURE — 99211 OFF/OP EST MAY X REQ PHY/QHP: CPT | Mod: S$GLB,CS,, | Performed by: INTERNAL MEDICINE

## 2021-10-20 DIAGNOSIS — F33.42 RECURRENT MAJOR DEPRESSIVE DISORDER, IN FULL REMISSION: ICD-10-CM

## 2021-10-20 RX ORDER — VENLAFAXINE HYDROCHLORIDE 150 MG/1
150 CAPSULE, EXTENDED RELEASE ORAL DAILY
Qty: 90 CAPSULE | Refills: 0 | Status: SHIPPED | OUTPATIENT
Start: 2021-10-20 | End: 2021-12-15

## 2021-10-25 ENCOUNTER — PATIENT MESSAGE (OUTPATIENT)
Dept: INTERNAL MEDICINE | Facility: CLINIC | Age: 66
End: 2021-10-25
Payer: MEDICARE

## 2021-11-08 ENCOUNTER — PATIENT MESSAGE (OUTPATIENT)
Dept: INTERNAL MEDICINE | Facility: CLINIC | Age: 66
End: 2021-11-08
Payer: MEDICARE

## 2021-11-12 ENCOUNTER — OFFICE VISIT (OUTPATIENT)
Dept: INTERNAL MEDICINE | Facility: CLINIC | Age: 66
End: 2021-11-12
Payer: MEDICARE

## 2021-11-12 VITALS
BODY MASS INDEX: 25.12 KG/M2 | WEIGHT: 180.13 LBS | OXYGEN SATURATION: 99 % | SYSTOLIC BLOOD PRESSURE: 124 MMHG | DIASTOLIC BLOOD PRESSURE: 78 MMHG | HEART RATE: 71 BPM

## 2021-11-12 DIAGNOSIS — M85.80 OSTEOPENIA, UNSPECIFIED LOCATION: ICD-10-CM

## 2021-11-12 DIAGNOSIS — F33.42 RECURRENT MAJOR DEPRESSIVE DISORDER, IN FULL REMISSION: ICD-10-CM

## 2021-11-12 DIAGNOSIS — Z01.818 PRE-OP EVALUATION: Primary | ICD-10-CM

## 2021-11-12 PROCEDURE — 99999 PR PBB SHADOW E&M-EST. PATIENT-LVL III: ICD-10-PCS | Mod: PBBFAC,,, | Performed by: PHYSICIAN ASSISTANT

## 2021-11-12 PROCEDURE — 99213 OFFICE O/P EST LOW 20 MIN: CPT | Mod: PBBFAC | Performed by: PHYSICIAN ASSISTANT

## 2021-11-12 PROCEDURE — 99214 PR OFFICE/OUTPT VISIT, EST, LEVL IV, 30-39 MIN: ICD-10-PCS | Mod: S$PBB,,, | Performed by: PHYSICIAN ASSISTANT

## 2021-11-12 PROCEDURE — 99214 OFFICE O/P EST MOD 30 MIN: CPT | Mod: S$PBB,,, | Performed by: PHYSICIAN ASSISTANT

## 2021-11-12 PROCEDURE — 99999 PR PBB SHADOW E&M-EST. PATIENT-LVL III: CPT | Mod: PBBFAC,,, | Performed by: PHYSICIAN ASSISTANT

## 2021-11-15 ENCOUNTER — HOSPITAL ENCOUNTER (OUTPATIENT)
Dept: RADIOLOGY | Facility: OTHER | Age: 66
Discharge: HOME | End: 2021-11-15
Attending: PHYSICIAN ASSISTANT
Payer: MEDICARE

## 2021-11-15 DIAGNOSIS — Z01.818 PRE-OP EVALUATION: ICD-10-CM

## 2021-11-15 PROCEDURE — 71046 XR CHEST PA AND LATERAL: ICD-10-PCS | Mod: 26,,, | Performed by: RADIOLOGY

## 2021-11-15 PROCEDURE — 71046 X-RAY EXAM CHEST 2 VIEWS: CPT | Mod: TC,FY

## 2021-11-15 PROCEDURE — 71046 X-RAY EXAM CHEST 2 VIEWS: CPT | Mod: 26,,, | Performed by: RADIOLOGY

## 2021-11-22 ENCOUNTER — HOSPITAL ENCOUNTER (OUTPATIENT)
Dept: CARDIOLOGY | Facility: OTHER | Age: 66
Discharge: HOME | End: 2021-11-22
Attending: INTERNAL MEDICINE
Payer: MEDICARE

## 2021-11-22 DIAGNOSIS — Z01.818 PRE-OP EVALUATION: ICD-10-CM

## 2021-11-22 PROCEDURE — 93010 ELECTROCARDIOGRAM REPORT: CPT | Mod: ,,, | Performed by: INTERNAL MEDICINE

## 2021-11-22 PROCEDURE — 93010 EKG 12-LEAD: ICD-10-PCS | Mod: ,,, | Performed by: INTERNAL MEDICINE

## 2021-11-22 PROCEDURE — 93005 ELECTROCARDIOGRAM TRACING: CPT

## 2021-11-23 ENCOUNTER — PATIENT MESSAGE (OUTPATIENT)
Dept: INTERNAL MEDICINE | Facility: CLINIC | Age: 66
End: 2021-11-23
Payer: MEDICARE

## 2021-11-29 ENCOUNTER — TELEPHONE (OUTPATIENT)
Dept: INTERNAL MEDICINE | Facility: CLINIC | Age: 66
End: 2021-11-29
Payer: MEDICARE

## 2021-12-02 ENCOUNTER — TELEPHONE (OUTPATIENT)
Dept: INTERNAL MEDICINE | Facility: CLINIC | Age: 66
End: 2021-12-02
Payer: MEDICARE

## 2021-12-15 ENCOUNTER — CLINICAL SUPPORT (OUTPATIENT)
Dept: INTERNAL MEDICINE | Facility: CLINIC | Age: 66
End: 2021-12-15
Payer: MEDICARE

## 2021-12-15 ENCOUNTER — OFFICE VISIT (OUTPATIENT)
Dept: INTERNAL MEDICINE | Facility: CLINIC | Age: 66
End: 2021-12-15
Attending: INTERNAL MEDICINE
Payer: MEDICARE

## 2021-12-15 VITALS
BODY MASS INDEX: 24.53 KG/M2 | WEIGHT: 175.25 LBS | HEART RATE: 73 BPM | SYSTOLIC BLOOD PRESSURE: 116 MMHG | OXYGEN SATURATION: 97 % | HEIGHT: 71 IN | DIASTOLIC BLOOD PRESSURE: 70 MMHG

## 2021-12-15 DIAGNOSIS — H91.90 HEARING LOSS, UNSPECIFIED HEARING LOSS TYPE, UNSPECIFIED LATERALITY: ICD-10-CM

## 2021-12-15 DIAGNOSIS — H90.3 SENSORINEURAL HEARING LOSS (SNHL) OF BOTH EARS: Primary | ICD-10-CM

## 2021-12-15 DIAGNOSIS — M85.80 OSTEOPENIA WITH HIGH RISK OF FRACTURE: Primary | ICD-10-CM

## 2021-12-15 DIAGNOSIS — Z13.220 ENCOUNTER FOR LIPID SCREENING FOR CARDIOVASCULAR DISEASE: ICD-10-CM

## 2021-12-15 DIAGNOSIS — Z13.6 ENCOUNTER FOR LIPID SCREENING FOR CARDIOVASCULAR DISEASE: ICD-10-CM

## 2021-12-15 DIAGNOSIS — F33.42 RECURRENT MAJOR DEPRESSIVE DISORDER, IN FULL REMISSION: ICD-10-CM

## 2021-12-15 DIAGNOSIS — Z23 IMMUNIZATION DUE: Primary | ICD-10-CM

## 2021-12-15 DIAGNOSIS — Z11.59 ENCOUNTER FOR HEPATITIS C SCREENING TEST FOR LOW RISK PATIENT: ICD-10-CM

## 2021-12-15 DIAGNOSIS — Z12.31 ENCOUNTER FOR SCREENING MAMMOGRAM FOR BREAST CANCER: ICD-10-CM

## 2021-12-15 PROCEDURE — 99999 PR PBB SHADOW E&M-EST. PATIENT-LVL III: CPT | Mod: PBBFAC,,, | Performed by: INTERNAL MEDICINE

## 2021-12-15 PROCEDURE — 99214 OFFICE O/P EST MOD 30 MIN: CPT | Mod: S$PBB,,, | Performed by: INTERNAL MEDICINE

## 2021-12-15 PROCEDURE — G0009 ADMIN PNEUMOCOCCAL VACCINE: HCPCS | Mod: PBBFAC

## 2021-12-15 PROCEDURE — 90670 PCV13 VACCINE IM: CPT | Mod: PBBFAC

## 2021-12-15 PROCEDURE — 99214 PR OFFICE/OUTPT VISIT, EST, LEVL IV, 30-39 MIN: ICD-10-PCS | Mod: S$PBB,,, | Performed by: INTERNAL MEDICINE

## 2021-12-15 PROCEDURE — 99213 OFFICE O/P EST LOW 20 MIN: CPT | Mod: PBBFAC,25 | Performed by: INTERNAL MEDICINE

## 2021-12-15 PROCEDURE — 99999 PR PBB SHADOW E&M-EST. PATIENT-LVL III: ICD-10-PCS | Mod: PBBFAC,,, | Performed by: INTERNAL MEDICINE

## 2021-12-15 RX ORDER — IBANDRONATE SODIUM 150 MG/1
150 TABLET, FILM COATED ORAL
Qty: 3 TABLET | Refills: 3 | Status: SHIPPED | OUTPATIENT
Start: 2021-12-15 | End: 2023-01-10

## 2021-12-15 RX ORDER — VENLAFAXINE HYDROCHLORIDE 37.5 MG/1
37.5 CAPSULE, EXTENDED RELEASE ORAL DAILY
Qty: 90 CAPSULE | Refills: 3 | Status: SHIPPED | OUTPATIENT
Start: 2021-12-15 | End: 2022-06-29 | Stop reason: SDUPTHER

## 2021-12-15 RX ORDER — VENLAFAXINE HYDROCHLORIDE 75 MG/1
75 CAPSULE, EXTENDED RELEASE ORAL DAILY
Qty: 90 CAPSULE | Refills: 3 | Status: SHIPPED | OUTPATIENT
Start: 2021-12-15 | End: 2022-04-19

## 2021-12-20 PROBLEM — H91.90 HEARING LOSS: Status: ACTIVE | Noted: 2021-12-20

## 2021-12-23 ENCOUNTER — LAB VISIT (OUTPATIENT)
Dept: LAB | Facility: OTHER | Age: 66
End: 2021-12-23
Attending: INTERNAL MEDICINE
Payer: MEDICARE

## 2021-12-23 DIAGNOSIS — F33.42 RECURRENT MAJOR DEPRESSIVE DISORDER, IN FULL REMISSION: ICD-10-CM

## 2021-12-23 DIAGNOSIS — Z13.6 ENCOUNTER FOR LIPID SCREENING FOR CARDIOVASCULAR DISEASE: ICD-10-CM

## 2021-12-23 DIAGNOSIS — Z11.59 ENCOUNTER FOR HEPATITIS C SCREENING TEST FOR LOW RISK PATIENT: ICD-10-CM

## 2021-12-23 DIAGNOSIS — Z13.220 ENCOUNTER FOR LIPID SCREENING FOR CARDIOVASCULAR DISEASE: ICD-10-CM

## 2021-12-23 DIAGNOSIS — M85.80 OSTEOPENIA WITH HIGH RISK OF FRACTURE: ICD-10-CM

## 2021-12-23 LAB
25(OH)D3+25(OH)D2 SERPL-MCNC: 97 NG/ML (ref 30–96)
CHOLEST SERPL-MCNC: 198 MG/DL (ref 120–199)
CHOLEST/HDLC SERPL: 2.8 {RATIO} (ref 2–5)
HCV AB SERPL QL IA: NEGATIVE
HDLC SERPL-MCNC: 72 MG/DL (ref 40–75)
HDLC SERPL: 36.4 % (ref 20–50)
LDLC SERPL CALC-MCNC: 113.8 MG/DL (ref 63–159)
NONHDLC SERPL-MCNC: 126 MG/DL
TRIGL SERPL-MCNC: 61 MG/DL (ref 30–150)
TSH SERPL DL<=0.005 MIU/L-ACNC: 1.67 UIU/ML (ref 0.4–4)

## 2021-12-23 PROCEDURE — 80061 LIPID PANEL: CPT | Performed by: INTERNAL MEDICINE

## 2021-12-23 PROCEDURE — 84443 ASSAY THYROID STIM HORMONE: CPT | Performed by: INTERNAL MEDICINE

## 2021-12-23 PROCEDURE — 86803 HEPATITIS C AB TEST: CPT | Performed by: INTERNAL MEDICINE

## 2021-12-23 PROCEDURE — 82306 VITAMIN D 25 HYDROXY: CPT | Performed by: INTERNAL MEDICINE

## 2021-12-23 PROCEDURE — 36415 COLL VENOUS BLD VENIPUNCTURE: CPT | Performed by: INTERNAL MEDICINE

## 2022-01-12 DIAGNOSIS — F33.42 RECURRENT MAJOR DEPRESSIVE DISORDER, IN FULL REMISSION: ICD-10-CM

## 2022-01-12 NOTE — TELEPHONE ENCOUNTER
No new care gaps identified.  Powered by [email or web address]. Reference number: [id number].   1/12/2022 1:40:31 AM CST

## 2022-01-15 RX ORDER — VENLAFAXINE HYDROCHLORIDE 150 MG/1
150 CAPSULE, EXTENDED RELEASE ORAL DAILY
Qty: 90 CAPSULE | Refills: 0 | OUTPATIENT
Start: 2022-01-15

## 2022-01-15 NOTE — TELEPHONE ENCOUNTER
Quick DC. Inappropriate Request    Refill Authorization Note   Arben De Luna  is requesting a refill authorization.  Brief Assessment and Rationale for Refill:  Quick Discontinue  Medication Therapy Plan:  Dose decreased with plans to wean off 12/15/21    Medication Reconciliation Completed:  No      Comments:   Pended Medication(s)       Requested Prescriptions     Refused Prescriptions Disp Refills    venlafaxine (EFFEXOR-XR) 150 MG Cp24 [Pharmacy Med Name: VENLAFAXINE HCL  MG CAP] 90 capsule 0     Sig: TAKE 1 CAPSULE (150 MG TOTAL) BY MOUTH ONCE DAILY.     Refused By: KENYA STOKES     Reason for Refusal: Refill not appropriate        Duplicate Pended Encounter(s)/ Last Prescribed Details: (includes pharmacy & prescriber details)   [facility] [address] Phone   [phone] (H)   [phone] (M)       venlafaxine (EFFEXOR-XR) 150 MG Cp24 [[id number]]  DISCONTINUED    Order Details  Dose: 150 mg Route: Oral Frequency: Daily   Dispense Quantity: 90 capsule Refills: 0    Note to Pharmacy: Please inactivate all prior scripts with same name and strength including any scripts on hold.         Sig: Take 1 capsule (150 mg total) by mouth once daily.         Start Date: 10/20/21 End Date: 12/15/21   Discontinued by: Joanie To MD on 12/15/2021 14:42                Note composed:7:19 AM 01/15/2022

## 2022-02-11 ENCOUNTER — HOSPITAL ENCOUNTER (OUTPATIENT)
Dept: RADIOLOGY | Facility: OTHER | Age: 67
Discharge: HOME | End: 2022-02-11
Attending: INTERNAL MEDICINE
Payer: MEDICARE

## 2022-02-11 DIAGNOSIS — Z12.31 ENCOUNTER FOR SCREENING MAMMOGRAM FOR BREAST CANCER: ICD-10-CM

## 2022-02-11 PROCEDURE — 77063 BREAST TOMOSYNTHESIS BI: CPT | Mod: TC

## 2022-02-11 PROCEDURE — 77067 SCR MAMMO BI INCL CAD: CPT | Mod: 26,,, | Performed by: RADIOLOGY

## 2022-02-11 PROCEDURE — 77067 MAMMO DIGITAL SCREENING BILAT WITH TOMO: ICD-10-PCS | Mod: 26,,, | Performed by: RADIOLOGY

## 2022-02-11 PROCEDURE — 77063 MAMMO DIGITAL SCREENING BILAT WITH TOMO: ICD-10-PCS | Mod: 26,,, | Performed by: RADIOLOGY

## 2022-02-11 PROCEDURE — 77063 BREAST TOMOSYNTHESIS BI: CPT | Mod: 26,,, | Performed by: RADIOLOGY

## 2022-02-21 ENCOUNTER — TELEPHONE (OUTPATIENT)
Dept: OTOLARYNGOLOGY | Facility: CLINIC | Age: 67
End: 2022-02-21
Payer: MEDICARE

## 2022-02-21 NOTE — TELEPHONE ENCOUNTER
Patient is having hearing loss / is now wearing hearing aids / asked her to bring last hearing test with her for her Wednesday appointment since it does say hearing loss and referral put in for hearing loss / patient states wants to talk with  About something else and will be here on Wednesday

## 2022-02-22 ENCOUNTER — PATIENT OUTREACH (OUTPATIENT)
Dept: ADMINISTRATIVE | Facility: OTHER | Age: 67
End: 2022-02-22
Payer: MEDICARE

## 2022-02-22 NOTE — PROGRESS NOTES
Care Everywhere:   Immunization: updated  Health Maintenance: updated  Media Review:   Legacy Review:   DIS:  Order placed:   Upcoming appts:  EFAX:  Task Tickets:  Referrals:

## 2022-02-23 ENCOUNTER — CLINICAL SUPPORT (OUTPATIENT)
Dept: OTOLARYNGOLOGY | Facility: CLINIC | Age: 67
End: 2022-02-23
Payer: MEDICARE

## 2022-02-23 ENCOUNTER — OFFICE VISIT (OUTPATIENT)
Dept: OTOLARYNGOLOGY | Facility: CLINIC | Age: 67
End: 2022-02-23
Attending: INTERNAL MEDICINE
Payer: MEDICARE

## 2022-02-23 ENCOUNTER — LAB VISIT (OUTPATIENT)
Dept: LAB | Facility: OTHER | Age: 67
End: 2022-02-23
Attending: OTOLARYNGOLOGY
Payer: MEDICARE

## 2022-02-23 VITALS
HEIGHT: 71 IN | WEIGHT: 181.13 LBS | BODY MASS INDEX: 25.36 KG/M2 | DIASTOLIC BLOOD PRESSURE: 71 MMHG | SYSTOLIC BLOOD PRESSURE: 124 MMHG | TEMPERATURE: 98 F | HEART RATE: 76 BPM

## 2022-02-23 DIAGNOSIS — G47.50 PARASOMNIA, UNSPECIFIED TYPE: ICD-10-CM

## 2022-02-23 DIAGNOSIS — R51.9 HEADACHE, UNSPECIFIED HEADACHE TYPE: ICD-10-CM

## 2022-02-23 DIAGNOSIS — H93.299 REDUCED SPEECH DISCRIMINATION: ICD-10-CM

## 2022-02-23 DIAGNOSIS — H91.90 HEARING LOSS, UNSPECIFIED HEARING LOSS TYPE, UNSPECIFIED LATERALITY: ICD-10-CM

## 2022-02-23 DIAGNOSIS — H90.3 ASYMMETRIC SNHL (SENSORINEURAL HEARING LOSS): ICD-10-CM

## 2022-02-23 DIAGNOSIS — H93.13 TINNITUS OF BOTH EARS: ICD-10-CM

## 2022-02-23 DIAGNOSIS — H90.3 SENSORINEURAL HEARING LOSS (SNHL) OF BOTH EARS: Primary | ICD-10-CM

## 2022-02-23 DIAGNOSIS — Z97.4 WEARS HEARING AID IN BOTH EARS: ICD-10-CM

## 2022-02-23 DIAGNOSIS — R42 DIZZINESS: ICD-10-CM

## 2022-02-23 DIAGNOSIS — H90.3 ASYMMETRICAL SENSORINEURAL HEARING LOSS: Primary | ICD-10-CM

## 2022-02-23 LAB
CREAT SERPL-MCNC: 0.9 MG/DL (ref 0.5–1.4)
EST. GFR  (AFRICAN AMERICAN): >60 ML/MIN/1.73 M^2
EST. GFR  (NON AFRICAN AMERICAN): >60 ML/MIN/1.73 M^2

## 2022-02-23 PROCEDURE — 99214 PR OFFICE/OUTPT VISIT, EST, LEVL IV, 30-39 MIN: ICD-10-PCS | Mod: S$GLB,,, | Performed by: OTOLARYNGOLOGY

## 2022-02-23 PROCEDURE — 82565 ASSAY OF CREATININE: CPT | Performed by: OTOLARYNGOLOGY

## 2022-02-23 PROCEDURE — 92550 PR TYMPANOMETRY AND REFLEX THRESHOLD MEASUREMENTS: ICD-10-PCS | Mod: S$GLB,,, | Performed by: AUDIOLOGIST-HEARING AID FITTER

## 2022-02-23 PROCEDURE — 36415 COLL VENOUS BLD VENIPUNCTURE: CPT | Performed by: OTOLARYNGOLOGY

## 2022-02-23 PROCEDURE — 99214 OFFICE O/P EST MOD 30 MIN: CPT | Mod: S$GLB,,, | Performed by: OTOLARYNGOLOGY

## 2022-02-23 PROCEDURE — 92557 COMPREHENSIVE HEARING TEST: CPT | Mod: S$GLB,,, | Performed by: AUDIOLOGIST-HEARING AID FITTER

## 2022-02-23 PROCEDURE — 92557 PR COMPREHENSIVE HEARING TEST: ICD-10-PCS | Mod: S$GLB,,, | Performed by: AUDIOLOGIST-HEARING AID FITTER

## 2022-02-23 PROCEDURE — 92550 TYMPANOMETRY & REFLEX THRESH: CPT | Mod: S$GLB,,, | Performed by: AUDIOLOGIST-HEARING AID FITTER

## 2022-02-23 RX ORDER — MAGNESIUM 30 MG
TABLET ORAL
COMMUNITY
End: 2022-04-21

## 2022-02-23 RX ORDER — ALENDRONATE SODIUM 35 MG/1
35 TABLET ORAL
COMMUNITY
End: 2022-04-21

## 2022-02-23 RX ORDER — CALCIUM CARBONATE 600 MG
600 TABLET ORAL 2 TIMES DAILY WITH MEALS
COMMUNITY

## 2022-02-23 NOTE — PATIENT INSTRUCTIONS
Proceed with blood test and imaging as ordered.    Follow up results next few weeks.    Set up appointment with neurology.

## 2022-02-24 ENCOUNTER — IMMUNIZATION (OUTPATIENT)
Dept: PHARMACY | Facility: CLINIC | Age: 67
End: 2022-02-24
Payer: MEDICARE

## 2022-03-04 ENCOUNTER — PES CALL (OUTPATIENT)
Dept: ADMINISTRATIVE | Facility: CLINIC | Age: 67
End: 2022-03-04
Payer: MEDICARE

## 2022-03-08 ENCOUNTER — HOSPITAL ENCOUNTER (OUTPATIENT)
Dept: RADIOLOGY | Facility: OTHER | Age: 67
Discharge: HOME | End: 2022-03-08
Attending: OTOLARYNGOLOGY
Payer: MEDICARE

## 2022-03-08 ENCOUNTER — PES CALL (OUTPATIENT)
Dept: ADMINISTRATIVE | Facility: CLINIC | Age: 67
End: 2022-03-08
Payer: MEDICARE

## 2022-03-08 DIAGNOSIS — H93.299 REDUCED SPEECH DISCRIMINATION: ICD-10-CM

## 2022-03-08 DIAGNOSIS — H90.3 ASYMMETRIC SNHL (SENSORINEURAL HEARING LOSS): ICD-10-CM

## 2022-03-08 PROCEDURE — 70553 MRI BRAIN STEM W/O & W/DYE: CPT | Mod: 26,,, | Performed by: RADIOLOGY

## 2022-03-08 PROCEDURE — 25500020 PHARM REV CODE 255: Performed by: OTOLARYNGOLOGY

## 2022-03-08 PROCEDURE — 70553 MRI IAC/TEMPORAL BONES W W/O CONTRAST: ICD-10-PCS | Mod: 26,,, | Performed by: RADIOLOGY

## 2022-03-08 PROCEDURE — 70553 MRI BRAIN STEM W/O & W/DYE: CPT | Mod: TC

## 2022-03-08 PROCEDURE — A9585 GADOBUTROL INJECTION: HCPCS | Performed by: OTOLARYNGOLOGY

## 2022-03-08 RX ORDER — GADOBUTROL 604.72 MG/ML
8 INJECTION INTRAVENOUS
Status: COMPLETED | OUTPATIENT
Start: 2022-03-08 | End: 2022-03-08

## 2022-03-08 RX ADMIN — GADOBUTROL 8 ML: 604.72 INJECTION INTRAVENOUS at 05:03

## 2022-03-09 ENCOUNTER — TELEPHONE (OUTPATIENT)
Dept: ADMINISTRATIVE | Facility: CLINIC | Age: 67
End: 2022-03-09
Payer: MEDICARE

## 2022-03-10 ENCOUNTER — TELEPHONE (OUTPATIENT)
Dept: ADMINISTRATIVE | Facility: CLINIC | Age: 67
End: 2022-03-10
Payer: MEDICARE

## 2022-03-10 ENCOUNTER — OFFICE VISIT (OUTPATIENT)
Dept: HOME HEALTH SERVICES | Facility: CLINIC | Age: 67
End: 2022-03-10
Payer: MEDICARE

## 2022-03-10 DIAGNOSIS — H90.3 SENSORINEURAL HEARING LOSS (SNHL) OF BOTH EARS: ICD-10-CM

## 2022-03-10 DIAGNOSIS — J34.2 NASAL SEPTAL DEVIATION: ICD-10-CM

## 2022-03-10 DIAGNOSIS — J47.9 BRONCHIECTASIS WITHOUT COMPLICATION: ICD-10-CM

## 2022-03-10 DIAGNOSIS — M85.80 OSTEOPENIA WITH HIGH RISK OF FRACTURE: ICD-10-CM

## 2022-03-10 DIAGNOSIS — J30.9 ALLERGIC RHINITIS, UNSPECIFIED SEASONALITY, UNSPECIFIED TRIGGER: ICD-10-CM

## 2022-03-10 DIAGNOSIS — Z00.00 ENCOUNTER FOR PREVENTIVE HEALTH EXAMINATION: Primary | ICD-10-CM

## 2022-03-10 DIAGNOSIS — F33.42 RECURRENT MAJOR DEPRESSIVE DISORDER, IN FULL REMISSION: ICD-10-CM

## 2022-03-10 DIAGNOSIS — R91.8 GROUND GLASS OPACITY PRESENT ON IMAGING OF LUNG: ICD-10-CM

## 2022-03-10 DIAGNOSIS — H91.93 BILATERAL HEARING LOSS, UNSPECIFIED HEARING LOSS TYPE: ICD-10-CM

## 2022-03-10 DIAGNOSIS — H93.13 TINNITUS OF BOTH EARS: ICD-10-CM

## 2022-03-10 DIAGNOSIS — R05.9 COUGH: ICD-10-CM

## 2022-03-10 PROCEDURE — G0439 PR MEDICARE ANNUAL WELLNESS SUBSEQUENT VISIT: ICD-10-PCS | Mod: 95,,, | Performed by: NURSE PRACTITIONER

## 2022-03-10 PROCEDURE — G0439 PPPS, SUBSEQ VISIT: HCPCS | Mod: 95,,, | Performed by: NURSE PRACTITIONER

## 2022-03-10 NOTE — PATIENT INSTRUCTIONS
Counseling and Referral of Other Preventative  (Italic type indicates deductible and co-insurance are waived)    Patient Name: Arben De Luna  Today's Date: 3/10/2022    Health Maintenance       Date Due Completion Date    TETANUS VACCINE Never done ---    Shingles Vaccine (3 of 3) 04/20/2022 2/23/2022    Colorectal Cancer Screening 02/09/2023 2/9/2018    Mammogram 02/11/2023 2/11/2022    DEXA Scan 01/05/2024 1/5/2021    Lipid Panel 12/23/2026 12/23/2021        No orders of the defined types were placed in this encounter.    The following information is provided to all patients.  This information is to help you find resources for any of the problems found today that may be affecting your health:                Living healthy guide: www.[facility].[place].gov      Understanding Diabetes: www.diabetes.org      Eating healthy: www.cdc.gov/healthyweight      [facility] home safety checklist: www.cdc.gov/steadi/patient.html      Agency on Aging: www.goea.[place].gov      Alcoholics anonymous (AA): www.aa.org      Physical Activity: www.kirsten.nih.gov/rg6ryfo      Tobacco use: www.quitwithusla.org

## 2022-03-10 NOTE — PROGRESS NOTES
The patient location is: [place]  The chief complaint leading to consultation is: awv    Visit type: audiovisual    Face to Face time with patient: 60  minutes of total time spent on the encounter, which includes face to face time and non-face to face time preparing to see the patient (eg, review of tests), Obtaining and/or reviewing separately obtained history, Documenting clinical information in the electronic or other health record, Independently interpreting results (not separately reported) and communicating results to the patient/family/caregiver, or Care coordination (not separately reported).         Each patient to whom he or she provides medical services by telemedicine is:  (1) informed of the relationship between the physician and patient and the respective role of any other health care provider with respect to management of the patient; and (2) notified that he or she may decline to receive medical services by telemedicine and may withdraw from such care at any time.    Notes:       Arben De Luna presented for a  Medicare AWV and comprehensive Health Risk Assessment today. The following components were reviewed and updated:    · Medical history  · Family History  · Social history  · Allergies and Current Medications  · Health Risk Assessment  · Health Maintenance  · Care Team         ** See Completed Assessments for Annual Wellness Visit within the encounter summary.**         The following assessments were completed:  · Living Situation  · CAGE  · Depression Screening  · Fall Risk Assessment (MACH 10)  · Hearing Assessment(HHI)  · Cognitive Function Screening  · Nutrition Screening  · ADL Screening  · PAQ Screening      There were no vitals filed for this visit.  There is no height or weight on file to calculate BMI.  Physical Exam  Constitutional:       Appearance: Normal appearance.   Neurological:      Mental Status: She is alert.   Psychiatric:         Attention and Perception: Attention and  perception normal.         Mood and Affect: Mood and affect normal.         Speech: Speech normal.         Behavior: Behavior normal. Behavior is cooperative.         Thought Content: Thought content normal.         Cognition and Memory: Cognition and memory normal.         Judgment: Judgment normal.               Diagnoses and health risks identified today and associated recommendations/orders:    1. Recurrent major depressive disorder, in full remission  Stable, followed by provider.    2. Bronchiectasis without complication  Stable, followed by provider.    3. Encounter for preventive health examination  Stable, followed by provider.    4. Sensorineural hearing loss (SNHL) of both ears  Stable, followed by provider.    5. Tinnitus of both ears  Stable, followed by provider.    6. Nasal septal deviation  Stable, followed by provider.    7. Bilateral hearing loss, unspecified hearing loss type  Stable, followed by provider.    8. Cough  Stable, followed by provider.    9. Ground glass opacity present on imaging of lung  Stable, followed by provider.    10. Osteopenia with high risk of fracture  Stable, followed by provider.    11. Allergic rhinitis, unspecified seasonality, unspecified trigger  Stable, followed by provider.      Provided Arben with a 5-10 year written screening schedule and personal prevention plan. Recommendations were developed using the USPSTF age appropriate recommendations. Education, counseling, and referrals were provided as needed. After Visit Summary printed and given to patient which includes a list of additional screenings\tests needed.    Follow up in about 1 year (around 3/10/2023) for awv.    Villa Juárez NP  I offered to discuss advanced care planning, including how to pick a person who would make decisions for you if you were unable to make them for yourself, called a health care power of , and what kind of decisions you might make such as use of life sustaining treatments  such as ventilators and tube feeding when faced with a life limiting illness recorded on a living will that they will need to know. (How you want to be cared for as you near the end of your natural life)     X Patient is interested in learning more about how to make advanced directives.  I provided them paperwork and offered to discuss this with them.

## 2022-03-20 NOTE — PROGRESS NOTES
Subjective:       Patient ID: Arben De Luna is a 67 y.o. female.    Chief Complaint: Other (Sometimes when waking up has a banging sound in head / also when happens gets nauseated and diarrhea / happens about once a month and lasts about 6 hours / follow up hearing aids)    She is a new patient for me scheduled today to have her ears and hearing checked, but more importantly wants to discuss recurring episodes over the past 3 years as follows.  States episodes occur as she is waking up with vivid dreams and notes sound of steel clanging in her head/ears.  Wakes up fully, but sound is still there and feels off balance with nausea, diarrhea, headache.  Occasionally goes back to sleep and clears, or gets up and has symptoms for about 6 hours to a lesser degree.  Episodes generally occur every 4-6 weeks.  Not aware of any triggers.  No personal or family history of migraine, seizures, sleep disorder.  No prior neuro or other evaluation.  From an ENT standpoint, has worn hearing aids since end of 2019 obtained from outside audiologist across the street.  No acoustical trauma.  No otalgia, otorrhea, fullness, fluctuating hearing.  No prior otologic history otherwise.  No nasal or throat or other otolaryngologic complaints.            Review of Systems     Constitutional: Negative for fever.      HENT: Positive for hearing loss and ringing in the ears.  Negative for ear discharge, ear pain, sore throat, stuffy nose, trouble swallowing and voice change.      Respiratory:  Negative for cough and shortness of breath.      Cardiovascular:  Negative for chest pain.     Gastrointestinal:  Negative for acid reflux.     Neurological: Positive for dizziness and headaches.     Hematologic: Negative for swollen glands.                Objective:        Vitals:    02/23/22 1348   BP: 124/71   Pulse: 76   Temp: 97.9 °F (36.6 °C)     Body mass index is 25.26 kg/m².  Physical Exam  Constitutional:       General: She is not in acute  distress.     Appearance: She is well-developed.   HENT:      Head: Normocephalic and atraumatic.      Right Ear: Tympanic membrane, ear canal and external ear normal.      Left Ear: Tympanic membrane, ear canal and external ear normal.      Nose: No nasal deformity, mucosal edema or rhinorrhea.      Mouth/Throat:      Mouth: Mucous membranes are moist.      Pharynx: No pharyngeal swelling, oropharyngeal exudate or posterior oropharyngeal erythema.   Neck:      Trachea: Phonation normal.   Pulmonary:      Effort: Pulmonary effort is normal. No respiratory distress.   Musculoskeletal:      Cervical back: Neck supple.   Lymphadenopathy:      Cervical: No cervical adenopathy.   Skin:     General: Skin is warm and dry.   Neurological:      Mental Status: She is alert and oriented to person, place, and time.   Psychiatric:         Speech: Speech normal.         Behavior: Behavior normal.         Tests / Results:        Reviewed/discussed above audiogram which demonstrates bilateral sensorineural hearing loss which is essentially symmetrical except at 1500 hertz but also decreased speech discrimination right at 80% versus 96% left.  Normal tympanogram bilaterally.            Assessment:       1. Hearing loss, unspecified hearing loss type, unspecified laterality    2. Wears hearing aid in both ears    3. Asymmetric SNHL (sensorineural hearing loss)    4. Reduced speech discrimination    5. Parasomnia, unspecified type    6. Tinnitus of both ears    7. Headache, unspecified headache type    8. Dizziness        Plan:       Reviewed above and considerations and recommendations and answered questions.  Will proceed with MRI of IAC's/temporal bones with and without contrast.  Follow-up results next few weeks as discussed.  Also discussed needs neuro evaluation of primary concern above which is not ENT related.

## 2022-03-21 ENCOUNTER — OFFICE VISIT (OUTPATIENT)
Dept: OTOLARYNGOLOGY | Facility: CLINIC | Age: 67
End: 2022-03-21
Payer: MEDICARE

## 2022-03-21 VITALS
DIASTOLIC BLOOD PRESSURE: 73 MMHG | HEIGHT: 71 IN | TEMPERATURE: 98 F | BODY MASS INDEX: 25.76 KG/M2 | SYSTOLIC BLOOD PRESSURE: 135 MMHG | WEIGHT: 184 LBS | HEART RATE: 65 BPM

## 2022-03-21 DIAGNOSIS — R51.9 HEADACHE, UNSPECIFIED HEADACHE TYPE: ICD-10-CM

## 2022-03-21 DIAGNOSIS — Z97.4 WEARS HEARING AID IN BOTH EARS: ICD-10-CM

## 2022-03-21 DIAGNOSIS — H93.299 REDUCED SPEECH DISCRIMINATION: ICD-10-CM

## 2022-03-21 DIAGNOSIS — R42 DIZZINESS: ICD-10-CM

## 2022-03-21 DIAGNOSIS — H90.3 SENSORINEURAL HEARING LOSS (SNHL) OF BOTH EARS: ICD-10-CM

## 2022-03-21 DIAGNOSIS — G47.50 PARASOMNIA, UNSPECIFIED TYPE: ICD-10-CM

## 2022-03-21 DIAGNOSIS — H90.3 ASYMMETRICAL SENSORINEURAL HEARING LOSS: ICD-10-CM

## 2022-03-21 PROCEDURE — 99214 OFFICE O/P EST MOD 30 MIN: CPT | Mod: S$GLB,,, | Performed by: OTOLARYNGOLOGY

## 2022-03-21 PROCEDURE — 99214 PR OFFICE/OUTPT VISIT, EST, LEVL IV, 30-39 MIN: ICD-10-PCS | Mod: S$GLB,,, | Performed by: OTOLARYNGOLOGY

## 2022-03-21 NOTE — PATIENT INSTRUCTIONS
Hearing protection.    Follow up with hearing aid audiologist with copy of recent audiogram.    See neurology re episodes as previously discussed.    Follow up here depending on above/as needed.

## 2022-04-18 ENCOUNTER — PATIENT OUTREACH (OUTPATIENT)
Dept: ADMINISTRATIVE | Facility: OTHER | Age: 67
End: 2022-04-18
Payer: MEDICARE

## 2022-04-18 NOTE — PROGRESS NOTES
Health Maintenance Due   Topic Date Due    TETANUS VACCINE  Never done    Hemoglobin A1c  Never done     Updates were requested from care everywhere.  Chart was reviewed for overdue Proactive [facility] Encounters (BRENDA) topics (CRS, Breast Cancer Screening, Eye exam)  Health Maintenance has been updated.  [facility] immunization registry triggered.  Immunizations were reconciled.

## 2022-04-19 ENCOUNTER — OFFICE VISIT (OUTPATIENT)
Dept: NEUROLOGY | Facility: CLINIC | Age: 67
End: 2022-04-19
Payer: MEDICARE

## 2022-04-19 DIAGNOSIS — F33.42 RECURRENT MAJOR DEPRESSIVE DISORDER, IN FULL REMISSION: ICD-10-CM

## 2022-04-19 DIAGNOSIS — R51.9 HEADACHE, UNSPECIFIED HEADACHE TYPE: ICD-10-CM

## 2022-04-19 DIAGNOSIS — G43.809 VESTIBULAR MIGRAINE: Primary | ICD-10-CM

## 2022-04-19 DIAGNOSIS — R42 DIZZINESS: ICD-10-CM

## 2022-04-19 DIAGNOSIS — G47.50 PARASOMNIA, UNSPECIFIED TYPE: ICD-10-CM

## 2022-04-19 PROCEDURE — 99214 PR OFFICE/OUTPT VISIT, EST, LEVL IV, 30-39 MIN: ICD-10-PCS | Mod: 95,,, | Performed by: NURSE PRACTITIONER

## 2022-04-19 PROCEDURE — 99214 OFFICE O/P EST MOD 30 MIN: CPT | Mod: 95,,, | Performed by: NURSE PRACTITIONER

## 2022-04-19 RX ORDER — ONDANSETRON HYDROCHLORIDE 8 MG/1
8 TABLET, FILM COATED ORAL EVERY 8 HOURS PRN
Qty: 30 TABLET | Refills: 2 | Status: SHIPPED | OUTPATIENT
Start: 2022-04-19 | End: 2022-04-21

## 2022-04-19 RX ORDER — RIZATRIPTAN BENZOATE 10 MG/1
10 TABLET, ORALLY DISINTEGRATING ORAL
Qty: 12 TABLET | Refills: 5 | Status: SHIPPED | OUTPATIENT
Start: 2022-04-19 | End: 2022-04-21

## 2022-04-19 NOTE — PROGRESS NOTES
"NEW PATIENT CONSULT  SUBJECTIVE:  Patient ID: Arben De Luna   MRN: [id number]  Referred By: Dr. Rasheeda Etienne*  Chief Complaint: Consult    The patient location is: in [place]   The chief complaint leading to consultation is: Consult  Visit type: Virtual visit with synchronous audio and video  Total time spent with patient: 16 minutes   Each patient to whom he or she provides medical services by telemedicine is:  (1) informed of the relationship between the physician and patient and the respective role of any other health care provider with respect to management of the patient; and (2) notified that he or she may decline to receive medical services by telemedicine and may withdraw from such care at any time.    History of Present Illness:   67 y.o. female with depression, osteopenia, and tinnitus, who presents for evaluation of headaches via virtual visit.  Began experiencing "episodes" 4 years ago after moving to [place].  Episodes are described of, waking up in the morning after a night of "vivid dreams", will wake up with "tinnitus" (which she occasionally experiences at baseline), sounds like tiny metal balls clashing together.  Will feel dizzy (lightheaded, balance is off), diarrhea, nausea, sometimes will have a headache, fatigue.  In the past she would treat with sudafed which she felt might help, but possibly not.  Has had one episode each month since January.  Typically lasts 4-5 hours before spontaneously resolving, however in March it took a couple of days for her stomach to resolve itself.  She is hard of hearing, wears hearing aids.  She has not noticed if she is more sensitive to light, had cataract surgery recently and has noticed the sun seems to be brighter since then.  No history of migraine, though she does have a history of "sinus headaches".      Treatments Tried and Response  effexor -   maxalt 10 mg mlt - given today   zofran - given today     Current Medications:    calcium carbonate " (OS-SULEIMAN) 600 mg calcium (1,500 mg) Tab, Take 600 mg by mouth 2 (two) times daily with meals., Disp: , Rfl:     ibandronate (BONIVA) 150 mg tablet, Take 1 tablet (150 mg total) by mouth every 30 days., Disp: 3 tablet, Rfl: 3    ondansetron (ZOFRAN) 8 MG tablet, Take 1 tablet (8 mg total) by mouth every 8 (eight) hours as needed for Nausea., Disp: 30 tablet, Rfl: 2    rizatriptan (MAXALT-MLT) 10 MG disintegrating tablet, Take 1 tablet (10 mg total) by mouth every 2 (two) hours as needed for Migraine. Max 30 mg/day., Disp: 12 tablet, Rfl: 5    triamcinolone acetonide 0.1% (KENALOG) 0.1 % cream, Apply to affected areas of body BID prn rash. Do not use on face, underarms, or groin., Disp: 80 g, Rfl: 1    venlafaxine (EFFEXOR-XR) 37.5 MG 24 hr capsule, Take 1 capsule (37.5 mg total) by mouth once daily., Disp: 90 capsule, Rfl: 3    Review of Systems - A review of 10+ systems was conducted with pertinent positive and negative findings documented in HPI with all other systems reviewed and negative.    PFSH: Past medical, family, and social history reviewed as documented in chart with pertinent positive medical, family, and social history detailed in HPI.    OBJECTIVE:  Vitals:  There were no vitals taken for this visit.     Physical Exam:  Constitutional: she appears well-developed and well-nourished. she is well groomed. NAD    Review of Data:   Notes from internal medicine, ENT reviewed   Labs:  Lab Visit on 02/23/2022   Component Date Value Ref Range Status    Creatinine 02/23/2022 0.9  0.5 - 1.4 mg/dL Final    eGFR if African American 02/23/2022 >60  >60 mL/min/1.73 m^2 Final    eGFR if non African American 02/23/2022 >60  >60 mL/min/1.73 m^2 Final   Lab Visit on 12/23/2021   Component Date Value Ref Range Status    Vit D, 25-Hydroxy 12/23/2021 97 (A) 30 - 96 ng/mL Final    TSH 12/23/2021 1.674  0.400 - 4.000 uIU/mL Final    Cholesterol 12/23/2021 198  120 - 199 mg/dL Final    Triglycerides 12/23/2021 61  30  - 150 mg/dL Final    HDL 12/23/2021 72  40 - 75 mg/dL Final    LDL Cholesterol 12/23/2021 113.8  63.0 - 159.0 mg/dL Final    HDL/Cholesterol Ratio 12/23/2021 36.4  20.0 - 50.0 % Final    Total Cholesterol/HDL Ratio 12/23/2021 2.8  2.0 - 5.0 Final    Non-HDL Cholesterol 12/23/2021 126  mg/dL Final    Hepatitis C Ab 12/23/2021 Negative  Negative Final   Lab Visit on 11/15/2021   Component Date Value Ref Range Status    WBC 11/15/2021 6.32  3.90 - 12.70 K/uL Final    RBC 11/15/2021 4.19  4.00 - 5.40 M/uL Final    Hemoglobin 11/15/2021 13.0  12.0 - 16.0 g/dL Final    Hematocrit 11/15/2021 39.5  37.0 - 48.5 % Final    MCV 11/15/2021 94  82 - 98 fL Final    MCH 11/15/2021 31.0  27.0 - 31.0 pg Final    MCHC 11/15/2021 32.9  32.0 - 36.0 g/dL Final    RDW 11/15/2021 12.8  11.5 - 14.5 % Final    Platelets 11/15/2021 261  150 - 450 K/uL Final    MPV 11/15/2021 10.4  9.2 - 12.9 fL Final    Immature Granulocytes 11/15/2021 0.3  0.0 - 0.5 % Final    Gran # (ANC) 11/15/2021 4.0  1.8 - 7.7 K/uL Final    Immature Grans (Abs) 11/15/2021 0.02  0.00 - 0.04 K/uL Final    Lymph # 11/15/2021 1.7  1.0 - 4.8 K/uL Final    Mono # 11/15/2021 0.5  0.3 - 1.0 K/uL Final    Eos # 11/15/2021 0.1  0.0 - 0.5 K/uL Final    Baso # 11/15/2021 0.07  0.00 - 0.20 K/uL Final    nRBC 11/15/2021 0  0 /100 WBC Final    Gran % 11/15/2021 63.5  38.0 - 73.0 % Final    Lymph % 11/15/2021 26.1  18.0 - 48.0 % Final    Mono % 11/15/2021 7.3  4.0 - 15.0 % Final    Eosinophil % 11/15/2021 1.7  0.0 - 8.0 % Final    Basophil % 11/15/2021 1.1  0.0 - 1.9 % Final    Differential Method 11/15/2021 Automated   Final    Sodium 11/15/2021 139  136 - 145 mmol/L Final    Potassium 11/15/2021 4.6  3.5 - 5.1 mmol/L Final    Chloride 11/15/2021 105  95 - 110 mmol/L Final    CO2 11/15/2021 24  23 - 29 mmol/L Final    Glucose 11/15/2021 89  70 - 110 mg/dL Final    BUN 11/15/2021 15  8 - 23 mg/dL Final    Creatinine 11/15/2021 0.9  0.5 - 1.4 mg/dL  Final    Calcium 11/15/2021 9.6  8.7 - 10.5 mg/dL Final    Total Protein 11/15/2021 7.4  6.0 - 8.4 g/dL Final    Albumin 11/15/2021 4.1  3.5 - 5.2 g/dL Final    Total Bilirubin 11/15/2021 0.6  0.1 - 1.0 mg/dL Final    Alkaline Phosphatase 11/15/2021 65  55 - 135 U/L Final    AST 11/15/2021 16  10 - 40 U/L Final    ALT 11/15/2021 14  10 - 44 U/L Final    Anion Gap 11/15/2021 10  8 - 16 mmol/L Final    eGFR if African American 11/15/2021 >60  >60 mL/min/1.73 m^2 Final    eGFR if non African American 11/15/2021 >60  >60 mL/min/1.73 m^2 Final     Imaging:  No results found for this or any previous visit.  Note: I have independently reviewed any/all imaging/labs/tests and agree with the report (s) as documented.  Any discrepancies will be as noted/demarcated by free text.  LASHAWN, FNP-C 4/19/2022    ASSESSMENT:  1. Vestibular migraine    2. Recurrent major depressive disorder, in full remission      PLAN:  - Discussed symptoms most likely vestibular migraine or some type of migraine variant.  Will have her treat next episode with rizatriptan and zofran to see how symptoms respond.    - For acute attacks - maxalt 10 mg mlt  - For nausea/dizziness - zofran 8 mg tabs   - Depression - resolved, in the process of tapering off venlafaxine   - RTC in 2 months or sooner if needed     Orders Placed This Encounter    rizatriptan (MAXALT-MLT) 10 MG disintegrating tablet    ondansetron (ZOFRAN) 8 MG tablet     Questions and concerns were sought and answered to the patient's stated verbal satisfaction.  The patient verbalizes understanding and agreement with the above stated treatment plan.     CC: MD Pennie Vasquez, FNP-C  [facility]  [phone]    Dr. Soto was available during today's encounter.

## 2022-04-21 ENCOUNTER — PATIENT MESSAGE (OUTPATIENT)
Dept: NEUROLOGY | Facility: CLINIC | Age: 67
End: 2022-04-21
Payer: MEDICARE

## 2022-04-21 ENCOUNTER — OFFICE VISIT (OUTPATIENT)
Dept: DERMATOLOGY | Facility: CLINIC | Age: 67
End: 2022-04-21
Payer: MEDICARE

## 2022-04-21 DIAGNOSIS — L81.4 LENTIGINES: ICD-10-CM

## 2022-04-21 DIAGNOSIS — L82.0 INFLAMED SEBORRHEIC KERATOSIS: ICD-10-CM

## 2022-04-21 DIAGNOSIS — Z12.83 SKIN CANCER SCREENING: ICD-10-CM

## 2022-04-21 DIAGNOSIS — D48.5 NEOPLASM OF UNCERTAIN BEHAVIOR OF SKIN: Primary | ICD-10-CM

## 2022-04-21 DIAGNOSIS — L57.0 ACTINIC KERATOSIS: ICD-10-CM

## 2022-04-21 DIAGNOSIS — D18.01 ANGIOMA OF SKIN: ICD-10-CM

## 2022-04-21 DIAGNOSIS — L82.1 SEBORRHEIC KERATOSIS: ICD-10-CM

## 2022-04-21 DIAGNOSIS — G43.809 VESTIBULAR MIGRAINE: ICD-10-CM

## 2022-04-21 DIAGNOSIS — Z85.828 HISTORY OF NONMELANOMA SKIN CANCER: ICD-10-CM

## 2022-04-21 DIAGNOSIS — L30.9 ECZEMA, UNSPECIFIED TYPE: ICD-10-CM

## 2022-04-21 PROCEDURE — 99213 OFFICE O/P EST LOW 20 MIN: CPT | Mod: 25,S$GLB,, | Performed by: DERMATOLOGY

## 2022-04-21 PROCEDURE — 11103 TANGNTL BX SKIN EA SEP/ADDL: CPT | Mod: S$GLB,,, | Performed by: DERMATOLOGY

## 2022-04-21 PROCEDURE — 88305 TISSUE EXAM BY PATHOLOGIST: CPT | Mod: 26,,, | Performed by: DERMATOLOGY

## 2022-04-21 PROCEDURE — 17110 PR DESTRUCTION BENIGN LESIONS UP TO 14: ICD-10-PCS | Mod: 59,S$GLB,, | Performed by: DERMATOLOGY

## 2022-04-21 PROCEDURE — 17003 DESTRUCTION, PREMALIGNANT LESIONS; SECOND THROUGH 14 LESIONS: ICD-10-PCS | Mod: S$GLB,,, | Performed by: DERMATOLOGY

## 2022-04-21 PROCEDURE — 11102 PR TANGENTIAL BIOPSY, SKIN, SINGLE LESION: ICD-10-PCS | Mod: S$GLB,,, | Performed by: DERMATOLOGY

## 2022-04-21 PROCEDURE — 17000 DESTRUCT PREMALG LESION: CPT | Mod: 59,S$GLB,, | Performed by: DERMATOLOGY

## 2022-04-21 PROCEDURE — 11103 PR TANGENTIAL BIOPSY, SKIN, EA ADDTL LESION: ICD-10-PCS | Mod: S$GLB,,, | Performed by: DERMATOLOGY

## 2022-04-21 PROCEDURE — 17110 DESTRUCTION B9 LES UP TO 14: CPT | Mod: 59,S$GLB,, | Performed by: DERMATOLOGY

## 2022-04-21 PROCEDURE — 17000 PR DESTRUCTION(LASER SURGERY,CRYOSURGERY,CHEMOSURGERY),PREMALIGNANT LESIONS,FIRST LESION: ICD-10-PCS | Mod: 59,S$GLB,, | Performed by: DERMATOLOGY

## 2022-04-21 PROCEDURE — 99213 PR OFFICE/OUTPT VISIT, EST, LEVL III, 20-29 MIN: ICD-10-PCS | Mod: 25,S$GLB,, | Performed by: DERMATOLOGY

## 2022-04-21 PROCEDURE — 17003 DESTRUCT PREMALG LES 2-14: CPT | Mod: S$GLB,,, | Performed by: DERMATOLOGY

## 2022-04-21 PROCEDURE — 88305 TISSUE EXAM BY PATHOLOGIST: CPT | Performed by: DERMATOLOGY

## 2022-04-21 PROCEDURE — 88305 TISSUE EXAM BY PATHOLOGIST: ICD-10-PCS | Mod: 26,,, | Performed by: DERMATOLOGY

## 2022-04-21 PROCEDURE — 11102 TANGNTL BX SKIN SINGLE LES: CPT | Mod: S$GLB,,, | Performed by: DERMATOLOGY

## 2022-04-21 RX ORDER — TRIAMCINOLONE ACETONIDE 1 MG/G
CREAM TOPICAL
Qty: 80 G | Refills: 1 | Status: SHIPPED | OUTPATIENT
Start: 2022-04-21 | End: 2023-03-10

## 2022-04-21 NOTE — PROGRESS NOTES
"  Patient Information  Name: Arben De Luna  : 1955  MRN: [id number]     Referring Physician:  No ref. provider found   Primary Care Physician:  Joanie To MD   Date of Visit: 2022      Subjective:     History of Present lllness:    Arben De Luna is a 67 y.o. female who presents with a chief complaint of moles, spot, and lesion.    Patient is here today for a "mole" check.     Today, patient complains of lesion(s):    Spot  Location: back  Duration: years  Symptoms: itching frequently lately, get rubbed by clothing  Relieving factors/Previous treatments: none, she scratches or rubs with a loofah    Lesion  Location: forehead  Duration: months  Signs/Symptoms: scaly  Relieving factors/Prior treatments: none    Patient was last seen: 2021.  Prior notes by myself reviewed.   Clinical documentation obtained by nursing staff reviewed.    Review of Systems   Skin: Positive for itching. Negative for rash.       Objective:   Physical Exam   Constitutional: She appears well-developed and well-nourished. No distress.   HENT:   Mouth/Throat: Lips normal.    Eyes: Lids are normal.  No conjunctival no injection.   Neurological: She is alert and oriented to person, place, and time. She is not disoriented.   Psychiatric: She has a normal mood and affect.   Skin:   Areas Examined (abnormalities noted in diagram):   Scalp / Hair Palpated and Inspected  Head / Face Inspection Performed  Neck Inspection Performed  Chest / Axilla Inspection Performed  Abdomen Inspection Performed  Genitals / Buttocks / Groin Inspection Performed  Back Inspection Performed  RUE Inspected  LUE Inspection Performed  RLE Inspected  LLE Inspection Performed  Nails and Digits Inspection Performed                 Diagram Legend     Erythematous scaling macule/papule c/w actinic keratosis       Vascular papule c/w angioma      Pigmented verrucoid papule/plaque c/w seborrheic keratosis      Yellow umbilicated papule c/w sebaceous " hyperplasia      Irregularly shaped tan macule c/w lentigo     1-2 mm smooth white papules consistent with Milia      Movable subcutaneous cyst with punctum c/w epidermal inclusion cyst      Subcutaneous movable cyst c/w pilar cyst      Firm pink to brown papule c/w dermatofibroma      Pedunculated fleshy papule(s) c/w skin tag(s)      Evenly pigmented macule c/w junctional nevus     Mildly variegated pigmented, slightly irregular-bordered macule c/w mildly atypical nevus      Flesh colored to evenly pigmented papule c/w intradermal nevus       Pink pearly papule/plaque c/w basal cell carcinoma      Erythematous hyperkeratotic cursted plaque c/w SCC      Surgical scar with no sign of skin cancer recurrence      Open and closed comedones      Inflammatory papules and pustules      Verrucoid papule consistent consistent with wart     Erythematous eczematous patches and plaques     Dystrophic onycholytic nail with subungual debris c/w onychomycosis     Umbilicated papule    Erythematous-base heme-crusted tan verrucoid plaque consistent with inflamed seborrheic keratosis     Erythematous Silvery Scaling Plaque c/w Psoriasis     See annotation            [] Data reviewed  [] Prior external notes reviewed  [] Independent review of test  [] Management discussed with another provider  [] Independent historian    Assessment / Plan:        Pathology Orders:     Normal Orders This Visit    Specimen to Pathology, Dermatology     Questions:    Procedure Type: Dermatology and skin neoplasms    Number of Specimens: 2    ------------------------: -------------------------    Spec 1 Procedure: Biopsy    Spec 1 Clinical Impression: r/o sBCC vs AK    Spec 1 Source: midline upper chest    ------------------------: -------------------------    Spec 2 Procedure: Biopsy    Spec 2 Clinical Impression: r/o lentigo maligna vs lentigo    Spec 2 Source: left posterior shoulder    Release to patient:         Neoplasm of uncertain behavior of  skin  -     Specimen to Pathology, Dermatology    Shave biopsy procedure note x 2:  Risk, benefits, and alternatives of biopsy are discussed with the patient, including risk of infection, scar, recurrence, and need for additional treatment of site. The patient agrees to the procedure by verbal consent. The area is marked and prepped with alcohol.  Approximately 1 mL of lidocaine 1% with epinephrine is used for local anesthesia. A sharp blade is used to remove a portion of the lesion. The specimen is sent for pathology. Hemostasis is obtained with aluminum chloride and/or monopolar hyfrecation if needed. The area is then dressed and bandaged. The patient tolerated the procedure well without adverse event. Written instructions on wound care were given and were reviewed with the patient, who is to call for any signs of bleeding or infection. The patient will be notified of the pathology results.    Inflamed seborrheic keratosis  Cryosurgery procedure note:  Risk, benefits, and alternatives of cryosurgery are discussed with the patient, including but not limited to the risks of hypopigmentation, hyperpigmentation, scar, infection, recurrence of lesion(s), development of new lesion(s), and need for additional treatment of the lesion(s). Verbal consent obtained from patient. Liquid nitrogen cryosurgery applied to 6 lesion(s) to produce a freeze injury. Counseled patient that blisters may form, and instructed patient on wound care with gentle cleansing and use of Vaseline ointment to keep moist until healed. Handout was provided, and patient was instructed to return to clinic in 1-2 months if lesions do not completely resolve.    Actinic keratosis  Cryosurgery procedure note:  Risk, benefits, and alternatives of cryosurgery are discussed with the patient, including but not limited to the risks of hypopigmentation, hyperpigmentation, scar, infection, recurrence of lesion(s), development of new lesion(s), and need for  additional treatment of the lesion(s). Verbal consent obtained from patient. Liquid nitrogen cryosurgery applied to 10 lesion(s) to produce a freeze injury. Counseled patient that blisters may form, and instructed patient on wound care with gentle cleansing and use of Vaseline ointment to keep moist until healed. Handout was provided, and patient was instructed to return to clinic in 1-2 months if lesions do not completely resolve.    Angioma of skin  These are benign vascular lesions that are inherited. Treatment is not necessary.    Seborrheic keratosis  These are benign, inherited growths without a malignant potential. Reassurance given to patient. No treatment is necessary.    Eczema, unspecified type  -     triamcinolone acetonide 0.1% (KENALOG) 0.1 % cream; Apply to affected areas of body BID prn rash. Do not use on face, underarms, or groin.  Dispense: 80 g; Refill: 1  Side effects from the overuse of topical steroids include thinning of skin, easy tearing/bruising of skin, stretch marks, spider veins, etc. Use the topical steroid no more than 2 days per week if used long-term and/or take breaks from the topical steroid, especially if any of the above side effects are noticed.    Lentigines  These are benign sun spots which should be monitored for changes. Daily sun protection will reduce the number of new lesions.   Recommend using a broad-spectrum, water-resistant sunscreen with SPF of 30 or higher--reapply every 2 hours. Seek shade, wear sun-protective clothing, and perform regular skin self-exams.    History of nonmelanoma skin cancer   - stable and chronic  Area(s) of previous nonmelanoma skin cancer evaluated with no evidence of recurrence. Reassurance provided.  Recommend using a broad-spectrum, water-resistant sunscreen with SPF of 30 or higher--reapply every 2 hours. Seek shade, wear sun-protective clothing, and perform regular skin self-exams.    Skin cancer screening  Total body skin examination  performed today as noted in physical exam. Suspicious lesion(s) were noted and/or biopsied as above.  Recommend using a broad-spectrum, water-resistant sunscreen with SPF of 30 or higher--reapply every 2 hours. Seek shade, wear sun-protective clothing, and perform regular skin self-exams.    Follow up in about 1 year (around 4/21/2023) for TBSE, or sooner dependent on pathology results.      Heydi Leroy MD, FAAD  [facility]

## 2022-04-22 RX ORDER — RIZATRIPTAN BENZOATE 10 MG/1
10 TABLET, ORALLY DISINTEGRATING ORAL
Qty: 12 TABLET | Refills: 5 | Status: SHIPPED | OUTPATIENT
Start: 2022-04-22 | End: 2023-03-10

## 2022-04-22 RX ORDER — ONDANSETRON HYDROCHLORIDE 8 MG/1
8 TABLET, FILM COATED ORAL EVERY 8 HOURS PRN
Qty: 30 TABLET | Refills: 2 | Status: SHIPPED | OUTPATIENT
Start: 2022-04-22 | End: 2023-03-10

## 2022-05-03 ENCOUNTER — PATIENT MESSAGE (OUTPATIENT)
Dept: OTOLARYNGOLOGY | Facility: CLINIC | Age: 67
End: 2022-05-03
Payer: MEDICARE

## 2022-05-04 LAB
FINAL PATHOLOGIC DIAGNOSIS: NORMAL
GROSS: NORMAL
Lab: NORMAL
MICROSCOPIC EXAM: NORMAL

## 2022-05-04 NOTE — PROGRESS NOTES
Final Pathologic Diagnosis   1. Skin, midline upper chest, shave biopsy:   - ACTINIC KERATOSIS   2.  Skin, left posterior shoulder, shave biopsy:   -SEBORRHEIC KERATOSIS, THIN AND PIGMENTED

## 2022-05-09 NOTE — PROGRESS NOTES
"Subjective:       Patient ID: Arben De Luna is a 67 y.o. female.    Chief Complaint: Follow-up (Go over MRI)    Last seen as a new patient on 02/23/2022 with history as follows:    She is a new patient for me scheduled today to have her ears and hearing checked, but more importantly wants to discuss recurring episodes over the past 3 years as follows.  States episodes occur as she is waking up with vivid dreams and notes sound of steel clanging in her head/ears.  Wakes up fully, but sound is still there and feels off balance with nausea, diarrhea, headache.  Occasionally goes back to sleep and clears, or gets up and has symptoms for about 6 hours to a lesser degree.  Episodes generally occur every 4-6 weeks.  Not aware of any triggers.  No personal or family history of migraine, seizures, sleep disorder.  No prior neuro or other evaluation.  From an ENT standpoint, has worn hearing aids since end of 2019 obtained from outside audiologist across the street.  No acoustical trauma.  No otalgia, otorrhea, fullness, fluctuating hearing.  No prior otologic history otherwise.  No nasal or throat or other otolaryngologic complaints.    Interval history:     At her last visit, normal exam was found, and audiologic testing demonstrated essentially symmetrical sensorineural hearing loss except at 1500 hertz with speech discrimination 80% right and 96% left.  Further evaluation with imaging was discussed and she proceeded with MRI as ordered without difficulty.  Here to follow up and discuss further.  Also advised at her last visit needs neuro evaluation of primary concern above which is not ENT related, initial neuro visit pending.  States looked up her symptoms on line and appears consistent with "exploding head syndrome" which she will discuss further with neuro.  No new complaints.           Review of Systems     Constitutional: Negative for fever.      HENT: Positive for hearing loss and ringing in the ears.  Negative for " ear discharge, ear pain, sore throat, stuffy nose, trouble swallowing and voice change.      Respiratory:  Negative for cough and shortness of breath.      Cardiovascular:  Negative for chest pain.     Gastrointestinal:  Negative for acid reflux.     Neurological: Positive for dizziness and headaches.     Hematologic: Negative for swollen glands.                Objective:        Vitals:    03/21/22 1512   BP: 135/73   Pulse: 65   Temp: 97.7 °F (36.5 °C)     Body mass index is 25.66 kg/m².  Physical Exam  Constitutional:       General: She is not in acute distress.     Appearance: She is well-developed.   HENT:      Head: Normocephalic and atraumatic.      Right Ear: Tympanic membrane, ear canal and external ear normal.      Left Ear: Tympanic membrane, ear canal and external ear normal.      Nose: No nasal deformity, mucosal edema or rhinorrhea.      Mouth/Throat:      Mouth: Mucous membranes are moist.      Pharynx: No pharyngeal swelling, oropharyngeal exudate or posterior oropharyngeal erythema.   Neck:      Trachea: Phonation normal.   Pulmonary:      Effort: Pulmonary effort is normal. No respiratory distress.   Musculoskeletal:      Cervical back: Neck supple.   Lymphadenopathy:      Cervical: No cervical adenopathy.   Skin:     General: Skin is warm and dry.   Neurological:      Mental Status: She is alert and oriented to person, place, and time.   Psychiatric:         Speech: Speech normal.         Behavior: Behavior normal.         Tests / Results:    MRI of IAC's/temporal bones without contrast done 03/08/2022 report reviewed which is within normal limits.    Audiogram done at last visit again reviewed.        Assessment:       1. Sensorineural hearing loss (SNHL) of both ears    2. Asymmetrical sensorineural hearing loss    3. Reduced speech discrimination    4. Wears hearing aid in both ears    5. Parasomnia, unspecified type    6. Headache, unspecified headache type    7. Dizziness        Plan:        Reviewed above and considerations recommendations and answered questions.  Will see neurology regarding episodes, concerns above as previously discussed.  Follow-up with hearing aid audiologist with copy of recent audiogram provided.  Hearing protection.  Follow-up depending on above/as needed and one year with audio unless change or problems prior.

## 2022-06-03 ENCOUNTER — IMMUNIZATION (OUTPATIENT)
Dept: PHARMACY | Facility: CLINIC | Age: 67
End: 2022-06-03
Payer: MEDICARE

## 2022-06-03 DIAGNOSIS — Z23 NEED FOR VACCINATION: Primary | ICD-10-CM

## 2022-06-21 ENCOUNTER — TELEPHONE (OUTPATIENT)
Dept: NEUROLOGY | Facility: CLINIC | Age: 67
End: 2022-06-21
Payer: MEDICARE

## 2022-06-21 NOTE — TELEPHONE ENCOUNTER
----- Message from Kaitlin Rosenthal sent at 6/21/2022  2:09 PM CDT -----  Regarding: Appt  Contact: pt @ [phone]  Pt has appt tomorrow and would like to change appt to virtual appt. Asking for a call back

## 2022-06-22 ENCOUNTER — OFFICE VISIT (OUTPATIENT)
Dept: NEUROLOGY | Facility: CLINIC | Age: 67
End: 2022-06-22
Payer: MEDICARE

## 2022-06-22 DIAGNOSIS — G43.809 VESTIBULAR MIGRAINE: Primary | ICD-10-CM

## 2022-06-22 PROCEDURE — 99213 PR OFFICE/OUTPT VISIT, EST, LEVL III, 20-29 MIN: ICD-10-PCS | Mod: 95,,, | Performed by: NURSE PRACTITIONER

## 2022-06-22 PROCEDURE — 99213 OFFICE O/P EST LOW 20 MIN: CPT | Mod: 95,,, | Performed by: NURSE PRACTITIONER

## 2022-06-22 NOTE — PROGRESS NOTES
"Established Patient   SUBJECTIVE:  Patient ID: Arben De Luna   Chief Complaint: Follow-up    History of Present Illness:  Arben De Luna is a 67 y.o. female who presents for follow-up of headaches via virtual visit.     The patient location is: in [place]   The chief complaint leading to consultation is: Follow-up  Visit type: Virtual visit with synchronous audio and video  Total time spent with patient: 5 minutes   Each patient to whom he or she provides medical services by telemedicine is:  (1) informed of the relationship between the physician and patient and the respective role of any other health care provider with respect to management of the patient; and (2) notified that he or she may decline to receive medical services by telemedicine and may withdraw from such care at any time.    Recommendations made at last Office Visit on 4/19/22:  - Discussed symptoms most likely vestibular migraine or some type of migraine variant.  Will have her treat next episode with rizatriptan and zofran to see how symptoms respond.    - For acute attacks - maxalt 10 mg mlt  - For nausea/dizziness - zofran 8 mg tabs   - Depression - resolved, in the process of tapering off venlafaxine   - RTC in 2 months or sooner if needed    06/22/2022 - Interval History:  She has tracked 4 episodes over the last 2 months, the last of which was May 9th.  She treated each with rizatriptan 10 mg mlt and zofran 8 mg, which seemed to work.  [place] episodes did not last hours as they previously had.  Denies presence of side effects to maxalt and zofran.  She is pleased with the improvement in her symptoms.  Does not wish to make adjustments to her treatment plan at this time.     Otherwise, information below is still accurate and current.     History of Present Illness:   67 y.o. female with depression, osteopenia, and tinnitus, who presents for evaluation of headaches via virtual visit.  Began experiencing "episodes" 4 years ago after moving to [facility] [place].  Episodes are described of, waking up in the morning after a night of "vivid dreams", will wake up with "tinnitus" (which she occasionally experiences at baseline), sounds like tiny metal balls clashing together.  Will feel dizzy (lightheaded, balance is off), diarrhea, nausea, sometimes will have a headache, fatigue.  In the past she would treat with sudafed which she felt might help, but possibly not.  Has had one episode each month since January.  Typically lasts 4-5 hours before spontaneously resolving, however in March it took a couple of days for her stomach to resolve itself.  She is hard of hearing, wears hearing aids.  She has not noticed if she is more sensitive to light, had cataract surgery recently and has noticed the sun seems to be brighter since then.  No history of migraine, though she does have a history of "sinus headaches".       Treatments Tried and Response  effexor -   maxalt 10 mg mlt - helps  zofran - helps     Current Medications:    calcium carbonate (OS-SULEIMAN) 600 mg calcium (1,500 mg) Tab, Take 600 mg by mouth 2 (two) times daily with meals., Disp: , Rfl:     ibandronate (BONIVA) 150 mg tablet, Take 1 tablet (150 mg total) by mouth every 30 days., Disp: 3 tablet, Rfl: 3    ondansetron (ZOFRAN) 8 MG tablet, Take 1 tablet (8 mg total) by mouth every 8 (eight) hours as needed for Nausea., Disp: 30 tablet, Rfl: 2    rizatriptan (MAXALT-MLT) 10 MG disintegrating tablet, Take 1 tablet (10 mg total) by mouth every 2 (two) hours as needed for Migraine. Max 30 mg/day., Disp: 12 tablet, Rfl: 5    sars-cov-2, covid-19, (MODERNA COVID-19) 50 mcg/0.25 ml injection (BOOSTER), Inject into the muscle., Disp: 0.25 mL, Rfl: 0    triamcinolone acetonide 0.1% (KENALOG) 0.1 % cream, Apply to affected areas of body BID prn rash. Do not use on face, underarms, or groin., Disp: 80 g, Rfl: 1    venlafaxine (EFFEXOR-XR) 37.5 MG 24 hr capsule, Take 1 capsule (37.5 mg total) by mouth once daily., " "Disp: 90 capsule, Rfl: 3    Review of Systems - A review of 10+ systems was conducted with pertinent positive and negative findings documented in HPI with all other systems reviewed and negative.    PFSH: Past medical, family, and social history reviewed as documented in chart with pertinent positive medical, family, and social history detailed in HPI.    OBJECTIVE:  Vitals: There were no vitals taken for this visit.     Physical Exam:  Constitutional: she appears well-developed and well-nourished. she is well groomed. NAD.     Review of Data:   Notes from ENT reviewed   Labs:  Office Visit on 04/21/2022   Component Date Value Ref Range Status    Final Pathologic Diagnosis 04/21/2022    Final                    Value:1. Skin, midline upper chest, shave biopsy:  - ACTINIC KERATOSIS  The lesion is atypical and further treatment may be required.  You will be  contacted by our providers office.  2.  Skin, left posterior shoulder, shave biopsy:  -SEBORRHEIC KERATOSIS, THIN AND PIGMENTED  This lesion is benign.      Gross 04/21/2022    Final                    Value:Patient ID/ Pathology ID:  [id number]  Received in 2 parts:  Part 1:  Received in formalin, labeled "midline upper chest", is a shave of tan-white  skin measuring 0.8 x 0.4 cm. Inked blue on deep surface, bisected, and  submitted entirely in cassette [id number]  Part 2:  Received in formalin, labeled "left posterior shoulder", is a shave of  tan-white skin measuring 0.7 x 0.6 cm, with a lesion on the skin surface  measuring 0.5 x 0.5 cm. Inked blue on deep surface, bisected, and submitted  entirely in cassette [id number]  Ivone Villanueva      Microscopic Exam 04/21/2022    Final                    Value:1. Skin with a proliferation of atypical keratinocytes in the lower epidermis  accompanied by acanthosis, hyperkeratosis, and parakeratosis overlying solar  elastosis.  Mild chronic inflammation is present.  2. Sections show minimal acanthosis at the " same level as the basal layer of  monomorphous keratocytes.  Some areas contain mildly enlarged, pigmented  keratocytes at the basal layer.  Sparse melanophages are noted in the  superficial dermis.      Disclaimer 04/21/2022    Final                    Value:Unless the case is a 'gross only' or additional testing only, the final  diagnosis for each specimen is based on a microscopic examination of  appropriate tissue sections.     Lab Visit on 02/23/2022   Component Date Value Ref Range Status    Creatinine 02/23/2022 0.9  0.5 - 1.4 mg/dL Final    eGFR if African American 02/23/2022 >60  >60 mL/min/1.73 m^2 Final    eGFR if non African American 02/23/2022 >60  >60 mL/min/1.73 m^2 Final   Lab Visit on 12/23/2021   Component Date Value Ref Range Status    Vit D, 25-Hydroxy 12/23/2021 97 (A) 30 - 96 ng/mL Final    TSH 12/23/2021 1.674  0.400 - 4.000 uIU/mL Final    Cholesterol 12/23/2021 198  120 - 199 mg/dL Final    Triglycerides 12/23/2021 61  30 - 150 mg/dL Final    HDL 12/23/2021 72  40 - 75 mg/dL Final    LDL Cholesterol 12/23/2021 113.8  63.0 - 159.0 mg/dL Final    HDL/Cholesterol Ratio 12/23/2021 36.4  20.0 - 50.0 % Final    Total Cholesterol/HDL Ratio 12/23/2021 2.8  2.0 - 5.0 Final    Non-HDL Cholesterol 12/23/2021 126  mg/dL Final    Hepatitis C Ab 12/23/2021 Negative  Negative Final     Imaging:  No results found for this or any previous visit.  Note: I have independently reviewed any/all imaging/labs/tests and agree with the report (s) as documented.  Any discrepancies will be as noted/demarcated by free text.  OMID HARDIN 6/22/2022    ASSESSMENT:  1. Vestibular migraine      PLAN:  - For acute attacks - continue treating with maxalt 10 mg mlt and zofran 8 mg odt    - Continue tracking headaches   - No refills needed   - RTC in 6 months or sooner if needed     Questions and concerns were sought and answered to the patient's stated verbal satisfaction.  The patient verbalizes understanding and  agreement with the above stated treatment plan.     CC: MD Pennie Vasquez, FNP-C  [facility]   [phone]    Dr. Soto was available during today's encounter.

## 2022-09-19 ENCOUNTER — OFFICE VISIT (OUTPATIENT)
Dept: DERMATOLOGY | Facility: CLINIC | Age: 67
End: 2022-09-19
Payer: MEDICARE

## 2022-09-19 DIAGNOSIS — L57.0 ACTINIC KERATOSIS: Primary | ICD-10-CM

## 2022-09-19 DIAGNOSIS — L57.8 CHRONIC ACTINIC DERMATITIS: ICD-10-CM

## 2022-09-19 PROCEDURE — 99214 OFFICE O/P EST MOD 30 MIN: CPT | Mod: S$GLB,,, | Performed by: DERMATOLOGY

## 2022-09-19 PROCEDURE — 99214 PR OFFICE/OUTPT VISIT, EST, LEVL IV, 30-39 MIN: ICD-10-PCS | Mod: S$GLB,,, | Performed by: DERMATOLOGY

## 2022-09-19 RX ORDER — FLUOROURACIL 50 MG/G
CREAM TOPICAL
Qty: 40 G | Refills: 0 | Status: SHIPPED | OUTPATIENT
Start: 2022-09-19 | End: 2023-03-10

## 2022-09-19 NOTE — PATIENT INSTRUCTIONS
INSTRUCTIONS FOR USING FLUOROURACIL 5% CREAM    Fluorouracil is a cream that will help eliminate early skin cancers by selectively destroying the precancerous cells.  You are to apply the cream to the following areas, not to individual spots:  Face    chest    -  Apply fluorouracil twice per day.          Apply only a pea-sized amount of fluorouracil to each of these areas.  When applying to the face and ears, keep the cream away from the creases around the eyes, ears, nose and mouth.  The cream will rub in more easily if you dampen the skin first.  After 1-2 weeks you will see small red spots appear.  This means the fluorouracil is working as it should.  To help avoid irritation, use a skin moisturizer such as CeraVe cream or ointment.  Apply a sunscreen everyday, especially when you are outside!  If you notice large areas of redness or irritation, do the following:  Stop fluorouracil for a few days until the irritation subsides.  After the irritation subsides, resume the fluorouracil, but only apply it once daily or every other day.        9. Treatment usually takes:   2 weeks

## 2022-09-19 NOTE — PROGRESS NOTES
Patient Information  Name: Arben De Luna  : 1955  MRN: [id number]     Referring Physician:  No ref. provider found   Primary Care Physician:  Joanie To MD   Date of Visit: 2022      Subjective:     History of Present lllness:    Arben De Luna is a 67 y.o. female who presents with a chief complaint of follow up of red spot on chest.    Location: nose and hair line  Duration: 6 months  Signs/Symptoms: flakes  Relieving factors/Prior treatments: none    Patient was last seen: 2022.  Prior notes by myself reviewed.   Clinical documentation obtained by nursing staff reviewed.    Review of Systems   Skin:  Positive for daily sunscreen use and activity-related sunscreen use.     Objective:   Physical Exam   Constitutional: She appears well-developed and well-nourished. No distress.   Neurological: She is alert and oriented to person, place, and time. She is not disoriented.   Psychiatric: She has a normal mood and affect.   Skin:   Areas Examined (abnormalities noted in diagram):   Head / Face Inspection Performed  Chest / Axilla Inspection Performed               Diagram Legend     Erythematous scaling macule/papule c/w actinic keratosis       Vascular papule c/w angioma      Pigmented verrucoid papule/plaque c/w seborrheic keratosis      Yellow umbilicated papule c/w sebaceous hyperplasia      Irregularly shaped tan macule c/w lentigo     1-2 mm smooth white papules consistent with Milia      Movable subcutaneous cyst with punctum c/w epidermal inclusion cyst      Subcutaneous movable cyst c/w pilar cyst      Firm pink to brown papule c/w dermatofibroma      Pedunculated fleshy papule(s) c/w skin tag(s)      Evenly pigmented macule c/w junctional nevus     Mildly variegated pigmented, slightly irregular-bordered macule c/w mildly atypical nevus      Flesh colored to evenly pigmented papule c/w intradermal nevus       Pink pearly papule/plaque c/w basal cell carcinoma      Erythematous  hyperkeratotic cursted plaque c/w SCC      Surgical scar with no sign of skin cancer recurrence      Open and closed comedones      Inflammatory papules and pustules      Verrucoid papule consistent consistent with wart     Erythematous eczematous patches and plaques     Dystrophic onycholytic nail with subungual debris c/w onychomycosis     Umbilicated papule    Erythematous-base heme-crusted tan verrucoid plaque consistent with inflamed seborrheic keratosis     Erythematous Silvery Scaling Plaque c/w Psoriasis     See annotation    No images are attached to the encounter or orders placed in the encounter.      [] Data reviewed  [] Prior external notes reviewed  [] Independent review of test  [] Management discussed with another provider  [] Independent historian    Assessment / Plan:        Actinic keratosis  Chronic actinic dermatitis  - chronic problem, not at treatment goal  Recommended and discussed the use of a Rx topical chemotherapeutic cream, called fluorouracil cream, to treat the sun-damaged and precancerous cells.    -     fluorouraciL (EFUDEX) 5 % cream; Apply a pea-sized amount to entire face and chest bid x 2 weeks.  Dispense: 40 g; Refill: 0  Patient was counseled extensively regarding the proper use and expected side effects of fluorouracil cream. Written instructions were provided.  Patient will discontinue use if areas begin to ulcerate, bleed, blister, etc.    Follow up in about 2 months (around 11/19/2022).      Heydi Leroy MD, FAAD  [facility]

## 2022-09-29 ENCOUNTER — PATIENT MESSAGE (OUTPATIENT)
Dept: DERMATOLOGY | Facility: CLINIC | Age: 67
End: 2022-09-29
Payer: MEDICARE

## 2022-10-09 ENCOUNTER — PATIENT MESSAGE (OUTPATIENT)
Dept: DERMATOLOGY | Facility: CLINIC | Age: 67
End: 2022-10-09
Payer: MEDICARE

## 2022-10-17 ENCOUNTER — TELEPHONE (OUTPATIENT)
Dept: DERMATOLOGY | Facility: CLINIC | Age: 67
End: 2022-10-17
Payer: MEDICARE

## 2022-10-17 NOTE — TELEPHONE ENCOUNTER
----- Message from Sven Nuñez sent at 10/17/2022 10:47 AM CDT -----  Regarding: F/U Appointment  Contact: [phone]  Pt is requesting an appointment for next month. She stated that Dr. Leroy wanted to see her in November. Please call to discuss further @ [phone]

## 2022-10-18 ENCOUNTER — TELEPHONE (OUTPATIENT)
Dept: DERMATOLOGY | Facility: CLINIC | Age: 67
End: 2022-10-18
Payer: MEDICARE

## 2022-10-18 NOTE — TELEPHONE ENCOUNTER
----- Message from Claudine Li sent at 10/17/2022  1:09 PM CDT -----  Regarding: speak with nurse  Contact: patient  [phone]    please call [patient was told to follow up in November can not find anything until next year waiting on a call back from the nurse thanks.

## 2022-11-11 ENCOUNTER — OFFICE VISIT (OUTPATIENT)
Dept: DERMATOLOGY | Facility: CLINIC | Age: 67
End: 2022-11-11
Payer: MEDICARE

## 2022-11-11 DIAGNOSIS — L57.8 CHRONIC ACTINIC DERMATITIS: ICD-10-CM

## 2022-11-11 DIAGNOSIS — L81.9 POSTINFLAMMATORY PIGMENTARY CHANGES: ICD-10-CM

## 2022-11-11 DIAGNOSIS — L57.0 ACTINIC KERATOSIS: Primary | ICD-10-CM

## 2022-11-11 PROCEDURE — 17000 DESTRUCT PREMALG LESION: CPT | Mod: S$GLB,,, | Performed by: DERMATOLOGY

## 2022-11-11 PROCEDURE — 99213 PR OFFICE/OUTPT VISIT, EST, LEVL III, 20-29 MIN: ICD-10-PCS | Mod: 25,S$GLB,, | Performed by: DERMATOLOGY

## 2022-11-11 PROCEDURE — 17000 PR DESTRUCTION(LASER SURGERY,CRYOSURGERY,CHEMOSURGERY),PREMALIGNANT LESIONS,FIRST LESION: ICD-10-PCS | Mod: S$GLB,,, | Performed by: DERMATOLOGY

## 2022-11-11 PROCEDURE — 99213 OFFICE O/P EST LOW 20 MIN: CPT | Mod: 25,S$GLB,, | Performed by: DERMATOLOGY

## 2022-11-11 NOTE — PROGRESS NOTES
Patient Information  Name: Arben De Luna  : 1955  MRN: [id number]     Referring Physician:  No ref. provider found   Primary Care Physician:  Joanie To MD   Date of Visit: 2022      Subjective:     History of Present lllness:    Arben De Luna is a 67 y.o. female who presents with a chief complaint of AKs.    Diagnosis: actinic keratosis  Location: face and chest  Signs/Symptoms: currently clear  Symptom course: improving  Treatment: completed Efudex BID x 14 days, finished about 1 month ago    Patient was last seen: 2022.  Prior notes by myself reviewed.   Clinical documentation obtained by nursing staff reviewed.    Review of Systems    Objective:   Physical Exam   Constitutional: She appears well-developed and well-nourished. No distress.   Neurological: She is alert and oriented to person, place, and time. She is not disoriented.   Psychiatric: She has a normal mood and affect.   Skin:   Areas Examined (abnormalities noted in diagram):   Head / Face Inspection Performed  Chest / Axilla Inspection Performed               Diagram Legend     Erythematous scaling macule/papule c/w actinic keratosis       Vascular papule c/w angioma      Pigmented verrucoid papule/plaque c/w seborrheic keratosis      Yellow umbilicated papule c/w sebaceous hyperplasia      Irregularly shaped tan macule c/w lentigo     1-2 mm smooth white papules consistent with Milia      Movable subcutaneous cyst with punctum c/w epidermal inclusion cyst      Subcutaneous movable cyst c/w pilar cyst      Firm pink to brown papule c/w dermatofibroma      Pedunculated fleshy papule(s) c/w skin tag(s)      Evenly pigmented macule c/w junctional nevus     Mildly variegated pigmented, slightly irregular-bordered macule c/w mildly atypical nevus      Flesh colored to evenly pigmented papule c/w intradermal nevus       Pink pearly papule/plaque c/w basal cell carcinoma      Erythematous hyperkeratotic cursted plaque c/w SCC       Surgical scar with no sign of skin cancer recurrence      Open and closed comedones      Inflammatory papules and pustules      Verrucoid papule consistent consistent with wart     Erythematous eczematous patches and plaques     Dystrophic onycholytic nail with subungual debris c/w onychomycosis     Umbilicated papule    Erythematous-base heme-crusted tan verrucoid plaque consistent with inflamed seborrheic keratosis     Erythematous Silvery Scaling Plaque c/w Psoriasis     See annotation    No images are attached to the encounter or orders placed in the encounter.      [] Data reviewed  [] Prior external notes reviewed  [] Independent review of test  [] Management discussed with another provider  [] Independent historian    Assessment / Plan:        Actinic keratosis  Cryosurgery procedure note:  Risk, benefits, and alternatives of cryosurgery are discussed with the patient, including but not limited to the risks of hypopigmentation, hyperpigmentation, scar, infection, recurrence of lesion(s), development of new lesion(s), and need for additional treatment of the lesion(s). Verbal consent obtained from patient. Liquid nitrogen cryosurgery applied to 1 lesion(s) to produce a freeze injury. Counseled patient that blisters may form, and instructed patient on wound care with gentle cleansing and use of Vaseline ointment to keep moist until healed. Handout was provided, and patient was instructed to return to clinic in 1-2 months if lesions do not completely resolve.    Chronic actinic dermatitis   - stable and chronic  Improved s/p course of Efudex.  Recommend using a broad-spectrum, water-resistant sunscreen with SPF of 30 or higher--reapply every 2 hours. Seek shade and wear sun-protective clothing/hat.    Postinflammatory pigmentary changes  This is a normal discoloration of the skin after an inflammatory process has resolved. It may take months to years to fade.  Recommend using a broad-spectrum, water-resistant  sunscreen with SPF of 30 or higher--reapply every 2 hours. Seek shade and wear sun-protective clothing/hat.    Follow up in about 6 months (around 5/11/2023) for TBSE.      Heydi Leroy MD, FAAD  [facility]

## 2022-12-19 ENCOUNTER — PATIENT MESSAGE (OUTPATIENT)
Dept: PRIMARY CARE CLINIC | Facility: CLINIC | Age: 67
End: 2022-12-19
Payer: MEDICARE

## 2022-12-19 DIAGNOSIS — F33.42 RECURRENT MAJOR DEPRESSIVE DISORDER, IN FULL REMISSION: ICD-10-CM

## 2022-12-19 RX ORDER — VENLAFAXINE HYDROCHLORIDE 37.5 MG/1
37.5 CAPSULE, EXTENDED RELEASE ORAL DAILY
Qty: 90 CAPSULE | Refills: 0 | Status: SHIPPED | OUTPATIENT
Start: 2022-12-19 | End: 2024-03-10 | Stop reason: SDUPTHER

## 2022-12-19 RX ORDER — VENLAFAXINE HYDROCHLORIDE 37.5 MG/1
37.5 CAPSULE, EXTENDED RELEASE ORAL DAILY
Qty: 90 CAPSULE | Refills: 3 | Status: SHIPPED | OUTPATIENT
Start: 2022-12-19 | End: 2022-12-19 | Stop reason: SDUPTHER

## 2023-01-10 ENCOUNTER — HOSPITAL ENCOUNTER (OUTPATIENT)
Dept: RADIOLOGY | Facility: OTHER | Age: 68
Discharge: HOME | End: 2023-01-10
Attending: INTERNAL MEDICINE
Payer: MEDICARE

## 2023-01-10 ENCOUNTER — OFFICE VISIT (OUTPATIENT)
Dept: INTERNAL MEDICINE | Facility: CLINIC | Age: 68
End: 2023-01-10
Attending: INTERNAL MEDICINE
Payer: MEDICARE

## 2023-01-10 VITALS
HEIGHT: 71 IN | OXYGEN SATURATION: 98 % | DIASTOLIC BLOOD PRESSURE: 80 MMHG | HEART RATE: 67 BPM | WEIGHT: 183.19 LBS | BODY MASS INDEX: 25.65 KG/M2 | SYSTOLIC BLOOD PRESSURE: 124 MMHG

## 2023-01-10 DIAGNOSIS — Z13.220 ENCOUNTER FOR LIPID SCREENING FOR CARDIOVASCULAR DISEASE: ICD-10-CM

## 2023-01-10 DIAGNOSIS — J01.00 ACUTE MAXILLARY SINUSITIS, RECURRENCE NOT SPECIFIED: ICD-10-CM

## 2023-01-10 DIAGNOSIS — M85.80 OSTEOPENIA WITH HIGH RISK OF FRACTURE: Primary | ICD-10-CM

## 2023-01-10 DIAGNOSIS — R79.89 HIGH SERUM HIGH DENSITY LIPOPROTEIN (HDL): ICD-10-CM

## 2023-01-10 DIAGNOSIS — N95.9 MENOPAUSAL PROBLEM: ICD-10-CM

## 2023-01-10 DIAGNOSIS — J47.9 BRONCHIECTASIS WITHOUT COMPLICATION: ICD-10-CM

## 2023-01-10 DIAGNOSIS — Z12.11 SCREENING FOR COLON CANCER: ICD-10-CM

## 2023-01-10 DIAGNOSIS — E55.9 VITAMIN D DEFICIENCY: ICD-10-CM

## 2023-01-10 DIAGNOSIS — R06.2 WHEEZING: ICD-10-CM

## 2023-01-10 DIAGNOSIS — Z13.6 ENCOUNTER FOR LIPID SCREENING FOR CARDIOVASCULAR DISEASE: ICD-10-CM

## 2023-01-10 DIAGNOSIS — F33.42 RECURRENT MAJOR DEPRESSIVE DISORDER, IN FULL REMISSION: ICD-10-CM

## 2023-01-10 DIAGNOSIS — Z12.31 ENCOUNTER FOR SCREENING MAMMOGRAM FOR MALIGNANT NEOPLASM OF BREAST: ICD-10-CM

## 2023-01-10 PROCEDURE — 71046 X-RAY EXAM CHEST 2 VIEWS: CPT | Mod: TC,FY

## 2023-01-10 PROCEDURE — 71046 X-RAY EXAM CHEST 2 VIEWS: CPT | Mod: 26,,, | Performed by: RADIOLOGY

## 2023-01-10 PROCEDURE — 99999 PR PBB SHADOW E&M-EST. PATIENT-LVL IV: ICD-10-PCS | Mod: PBBFAC,,, | Performed by: INTERNAL MEDICINE

## 2023-01-10 PROCEDURE — 99215 OFFICE O/P EST HI 40 MIN: CPT | Mod: S$PBB,,, | Performed by: INTERNAL MEDICINE

## 2023-01-10 PROCEDURE — 99999 PR PBB SHADOW E&M-EST. PATIENT-LVL IV: CPT | Mod: PBBFAC,,, | Performed by: INTERNAL MEDICINE

## 2023-01-10 PROCEDURE — 99215 PR OFFICE/OUTPT VISIT, EST, LEVL V, 40-54 MIN: ICD-10-PCS | Mod: S$PBB,,, | Performed by: INTERNAL MEDICINE

## 2023-01-10 PROCEDURE — 99214 OFFICE O/P EST MOD 30 MIN: CPT | Mod: PBBFAC,25 | Performed by: INTERNAL MEDICINE

## 2023-01-10 PROCEDURE — 71046 XR CHEST PA AND LATERAL: ICD-10-PCS | Mod: 26,,, | Performed by: RADIOLOGY

## 2023-01-10 RX ORDER — PREDNISONE 20 MG/1
20 TABLET ORAL DAILY
Qty: 3 TABLET | Refills: 0 | Status: SHIPPED | OUTPATIENT
Start: 2023-01-10 | End: 2023-01-13

## 2023-01-10 RX ORDER — ALBUTEROL SULFATE 90 UG/1
2 AEROSOL, METERED RESPIRATORY (INHALATION) EVERY 6 HOURS PRN
Qty: 16 G | Refills: 1 | Status: SHIPPED | OUTPATIENT
Start: 2023-01-10

## 2023-01-10 RX ORDER — DOXYCYCLINE HYCLATE 100 MG
100 TABLET ORAL 2 TIMES DAILY
Qty: 14 TABLET | Refills: 0 | Status: SHIPPED | OUTPATIENT
Start: 2023-01-10 | End: 2023-01-17

## 2023-01-10 NOTE — PROGRESS NOTES
Subjective:   Patient ID: Arben De Luna is a 67 y.o. female  Chief complaint:   Chief Complaint   Patient presents with    Annual Exam       HPI  Here for follow up of osteopenia:   At [facility], pt returned to teaching at [employer]  - last annual exam 12/2021    She noticed more fullness in left side of face near back teeth - cap located at left upper maxilla that is bothersome    Today:   Had resp infection   Sinus congestion and cough - green   Fatigued   had same and his covid test was negative   Mucous started clear and then green and yellow   No fevers at this time   No wheezing   - taking nyquil and decongestant during the day   - no flonase    Depression:   - Doing well on effexor 37.5 - did tez weaning and tez lwoer dose    - taking rx for a few years - no si/hi/rivera    Vit d def:   Was elevated at last lab check - stopped and then changed 3 times per week     Osteopenia, high risk for fracture:   - taking ca 600mg bid daily, vit d as above  - tried boniva - reports has a crown and going back 5 times to readjust and feels like bite needs to be adjusted and then got nervous and stopped boniva last month   - less exercise but working to improve this  Previously:   - was on fosamax earlier this year for short period of time - tolerated but concerned about pot SE - opted to stop Rx and then resumed over past month and doing well - discussed switching to Boniva since less frequent dosing     Allergies, wearing hearing aids:   - stable   - improvement in tinnitis   - seen by ent - suspicious for vestibular migraine and no recurrence since May of last year  Prev:   - reports tinnitis at times   - has long dream - different at times - long - talks in sleep and will awaken and will feel ringing in ears   - drinking a lot of water and sudafed and sleep can help resolve sx   - can occur every 3 months over past 2-3 years  - no room spinning   - not skipping effexor   - will have more sinus congestion and occ pain at  "max sinuses   - no popping or clicking in ear   - Given claritin, flonase    Chronic cough resolved - sick at thsi time as above but prev resolved, brochiectasis on prior imaging - CT 10/2020:   Prev:   No reflux symptoms   chronic cough - dry  - stable today  allx testing   Seen by  ENT  Seen by pulm   - pfts reviewed along with cxr and ct chest    Renal cyst: seen on CT and then us 7/2019:  Simple after review with radiology     HM:   Tdap: due  Utd on bival covid booster 10/2022  Mmg and cscope  dexa    Review of Systems    Objective:  Vitals:    01/10/23 1402   BP: 124/80   BP Location: Left arm   Patient Position: Sitting   Pulse: 67   SpO2: 98%   Weight: 83.1 kg (183 lb 3.2 oz)   Height: 5' 11" (1.803 m)     Body mass index is 25.55 kg/m².    Physical Exam  Vitals reviewed.   Constitutional:       Appearance: Normal appearance. She is well-developed.   HENT:      Head: Normocephalic and atraumatic.      Right Ear: Tympanic membrane, ear canal and external ear normal.      Left Ear: Tympanic membrane, ear canal and external ear normal.      Nose: Congestion present.      Comments: + ttp of max sinuses   Left side of face with more fullness than right  No ttp        Mouth/Throat:      Mouth: Mucous membranes are moist.      Pharynx: Oropharynx is clear. No oropharyngeal exudate.   Eyes:      Extraocular Movements: Extraocular movements intact.      Conjunctiva/sclera: Conjunctivae normal.   Neck:      Thyroid: No thyromegaly.      Comments: Mild reactive ln at cervical level  Cardiovascular:      Rate and Rhythm: Normal rate and regular rhythm.      Pulses: Normal pulses.      Heart sounds: Normal heart sounds.   Pulmonary:      Effort: Pulmonary effort is normal. No respiratory distress.      Breath sounds: No stridor. Wheezing present. No rales.      Comments: Talking in complete sentences   Few ext wheezes in upper lung fields   Abdominal:      General: Bowel sounds are normal.      Palpations: Abdomen is " soft.   Musculoskeletal:         General: No swelling or tenderness.      Cervical back: Neck supple.   Skin:     General: Skin is warm and dry.      Capillary Refill: Capillary refill takes less than 2 seconds.   Neurological:      General: No focal deficit present.      Mental Status: She is alert and oriented to person, place, and time. Mental status is at baseline.   Psychiatric:         Behavior: Behavior normal.         Thought Content: Thought content normal.       Assessment:  1. Osteopenia with high risk of fracture    2. Menopausal problem    3. Encounter for screening mammogram for malignant neoplasm of breast    4. Screening for colon cancer    5. Acute maxillary sinusitis, recurrence not specified    6. Vitamin D deficiency    7. Encounter for lipid screening for cardiovascular disease    8. Recurrent major depressive disorder, in full remission    9. High serum high density lipoprotein (HDL)    10. Wheezing    11. Bronchiectasis without complication        Plan:    Arben was seen today for annual exam.    Diagnoses and all orders for this visit:    Osteopenia with high risk of fracture  -     DXA Bone Density Spine And Hip; Future  Cont xander/vit d   Schedule dexa   No longer taking boniva     Menopausal problem  -     DXA Bone Density Spine And Hip; Future    Encounter for screening mammogram for malignant neoplasm of breast  -     Mammo Digital Screening Bilat w/ Lorenzo; Future    Screening for colon cancer  -     Ambulatory referral/consult to Endo Procedure ; Future    Acute maxillary sinusitis, recurrence not specified  -     doxycycline (VIBRA-TABS) 100 MG tablet; Take 1 tablet (100 mg total) by mouth 2 (two) times daily. for 7 days    Vitamin D deficiency  -     Vitamin D; Future  -     TSH; Future  -     CBC Auto Differential; Future  -     Comprehensive Metabolic Panel; Future    Encounter for lipid screening for cardiovascular disease  -     Lipid Panel; Future    Recurrent major  depressive disorder, in full remission  -     TSH; Future  -     CBC Auto Differential; Future  -     Comprehensive Metabolic Panel; Future  Stable   Cont effexpr    High serum high density lipoprotein (HDL)  -     Lipid Panel; Future    Wheezing  -     albuterol (VENTOLIN HFA) 90 mcg/actuation inhaler; Inhale 2 puffs into the lungs every 6 (six) hours as needed for Wheezing. Rescue  -     X-Ray Chest PA And Lateral; Future  -     predniSONE (DELTASONE) 20 MG tablet; Take 1 tablet (20 mg total) by mouth once daily. for 3 days    Bronchiectasis without complication  Seen on prior ct scan   Stable     Recommend daily sunscreen, cardiovascular exercise min 30 min 5 days per week. Seatbelts routinely.    Today:   Sx recheck - facial fullness left > right - in 6 weeks after resp illness resolved     40 min spent in care of patient including chart review, history, orders, physical, orders and coordination of care    Health Maintenance   Topic Date Due    TETANUS VACCINE  Never done    Mammogram  02/11/2023    DEXA Scan  01/05/2024    Lipid Panel  12/23/2026    Hepatitis C Screening  Completed

## 2023-01-15 ENCOUNTER — PATIENT MESSAGE (OUTPATIENT)
Dept: INTERNAL MEDICINE | Facility: CLINIC | Age: 68
End: 2023-01-15
Payer: MEDICARE

## 2023-01-26 ENCOUNTER — OFFICE VISIT (OUTPATIENT)
Dept: INTERNAL MEDICINE | Facility: CLINIC | Age: 68
End: 2023-01-26
Attending: INTERNAL MEDICINE
Payer: MEDICARE

## 2023-01-26 VITALS
WEIGHT: 182.31 LBS | HEIGHT: 71 IN | HEART RATE: 50 BPM | BODY MASS INDEX: 25.52 KG/M2 | DIASTOLIC BLOOD PRESSURE: 70 MMHG | OXYGEN SATURATION: 99 % | SYSTOLIC BLOOD PRESSURE: 126 MMHG

## 2023-01-26 DIAGNOSIS — R30.9 PAINFUL URINATION: ICD-10-CM

## 2023-01-26 DIAGNOSIS — N95.0 PMB (POSTMENOPAUSAL BLEEDING): Primary | ICD-10-CM

## 2023-01-26 PROCEDURE — 99999 PR PBB SHADOW E&M-EST. PATIENT-LVL III: CPT | Mod: PBBFAC,,, | Performed by: INTERNAL MEDICINE

## 2023-01-26 PROCEDURE — 99214 OFFICE O/P EST MOD 30 MIN: CPT | Mod: S$PBB,,, | Performed by: INTERNAL MEDICINE

## 2023-01-26 PROCEDURE — 99214 PR OFFICE/OUTPT VISIT, EST, LEVL IV, 30-39 MIN: ICD-10-PCS | Mod: S$PBB,,, | Performed by: INTERNAL MEDICINE

## 2023-01-26 PROCEDURE — 99213 OFFICE O/P EST LOW 20 MIN: CPT | Mod: PBBFAC | Performed by: INTERNAL MEDICINE

## 2023-01-26 PROCEDURE — 99999 PR PBB SHADOW E&M-EST. PATIENT-LVL III: ICD-10-PCS | Mod: PBBFAC,,, | Performed by: INTERNAL MEDICINE

## 2023-01-26 NOTE — PROGRESS NOTES
"Subjective:   Patient ID: Arben De Luna is a 67 y.o. female  Chief complaint: No chief complaint on file.      HPI  Here for possible vaginal spotting     To have annual labs including tsh completed now that well from resp infection  Completed doxy for sinusitis     Had "spotting" on 1/14  Had what she describes as tightening and pain in vulva area with urination x  a few episodes that day - has never had a uti but she was unsure if sx of  this - also noted slow urination with these episodes  - saw fresh small amount of blood on tp with wiping after these episodes - unsure if from urethra or vaginal   - That day started doxy for sinusitis   No recurrence  of sx since then   No hx of renal stones   No hx of vaginal bleeding   No vag discharge or abd pain  No fevers or flank pain or dysuria    Lmp: 15 years   Has been several years since seen by gyn     Sister with hx of Uterine cancer - dx in late 60s     Review of Systems    Objective:  Vitals:    01/26/23 1217   BP: 126/70   BP Location: Left arm   Patient Position: Sitting   Pulse: (!) 50   SpO2: 99%   Weight: 82.7 kg (182 lb 5.1 oz)   Height: 5' 11" (1.803 m)     Body mass index is 25.43 kg/m².    Physical Exam  Vitals reviewed.   Constitutional:       Appearance: Normal appearance. She is well-developed.   HENT:      Head: Normocephalic and atraumatic.   Eyes:      Extraocular Movements: Extraocular movements intact.      Conjunctiva/sclera: Conjunctivae normal.   Cardiovascular:      Rate and Rhythm: Normal rate and regular rhythm.      Pulses: Normal pulses.      Heart sounds: Normal heart sounds.   Pulmonary:      Effort: Pulmonary effort is normal.      Breath sounds: Normal breath sounds.   Abdominal:      General: Bowel sounds are normal. There is no distension.      Palpations: Abdomen is soft. There is no mass.      Tenderness: There is no abdominal tenderness. There is no right CVA tenderness, left CVA tenderness, guarding or rebound.      Hernia: No " hernia is present.   Musculoskeletal:         General: No swelling or tenderness.      Cervical back: Normal range of motion and neck supple.   Skin:     General: Skin is warm and dry.      Capillary Refill: Capillary refill takes less than 2 seconds.      Nails: There is no clubbing.   Neurological:      Mental Status: She is alert and oriented to person, place, and time. Mental status is at baseline.      Gait: Gait normal.   Psychiatric:         Mood and Affect: Mood normal.         Speech: Speech normal.         Behavior: Behavior normal.         Thought Content: Thought content normal.         Judgment: Judgment normal.       Assessment:  1. PMB (postmenopausal bleeding)    2. Painful urination        Plan:  Diagnoses and all orders for this visit:    PMB (postmenopausal bleeding)  -     US Transvaginal Non OB; Future  -     Ambulatory referral/consult to Obstetrics / Gynecology; Future    Painful urination  -     Cancel: Urinalysis, Reflex to Urine Culture Urine, Clean Catch; Future  -     Urinalysis, Reflex to Urine Culture Urine, Clean Catch; Future    Schedule annual labs for now   Ua   Transvag US   F/u with gyn   Will consider ct urogram and urology follow up pending ua results   Rtc prompts reviwed   All questions were answered and pt verbalized understanding of plan.     Health Maintenance   Topic Date Due    TETANUS VACCINE  Never done    Mammogram  02/11/2023    DEXA Scan  01/05/2024    Lipid Panel  12/23/2026    Hepatitis C Screening  Completed

## 2023-01-27 ENCOUNTER — HOSPITAL ENCOUNTER (OUTPATIENT)
Dept: RADIOLOGY | Facility: OTHER | Age: 68
Discharge: HOME | End: 2023-01-27
Attending: INTERNAL MEDICINE
Payer: MEDICARE

## 2023-01-27 DIAGNOSIS — N95.9 MENOPAUSAL PROBLEM: ICD-10-CM

## 2023-01-27 DIAGNOSIS — M85.80 OSTEOPENIA WITH HIGH RISK OF FRACTURE: ICD-10-CM

## 2023-01-27 PROCEDURE — 77080 DXA BONE DENSITY AXIAL: CPT | Mod: TC

## 2023-01-27 PROCEDURE — 77080 DEXA BONE DENSITY SPINE HIP: ICD-10-PCS | Mod: 26,,, | Performed by: RADIOLOGY

## 2023-01-27 PROCEDURE — 77080 DXA BONE DENSITY AXIAL: CPT | Mod: 26,,, | Performed by: RADIOLOGY

## 2023-02-03 ENCOUNTER — HOSPITAL ENCOUNTER (OUTPATIENT)
Dept: RADIOLOGY | Facility: OTHER | Age: 68
Discharge: HOME | End: 2023-02-03
Attending: INTERNAL MEDICINE
Payer: MEDICARE

## 2023-02-03 DIAGNOSIS — N95.0 PMB (POSTMENOPAUSAL BLEEDING): ICD-10-CM

## 2023-02-03 PROCEDURE — 76830 TRANSVAGINAL US NON-OB: CPT | Mod: TC

## 2023-02-03 PROCEDURE — 76830 US TRANSVAGINAL NON OB: ICD-10-PCS | Mod: 26,,, | Performed by: RADIOLOGY

## 2023-02-03 PROCEDURE — 76830 TRANSVAGINAL US NON-OB: CPT | Mod: 26,,, | Performed by: RADIOLOGY

## 2023-03-06 ENCOUNTER — OFFICE VISIT (OUTPATIENT)
Dept: INTERNAL MEDICINE | Facility: CLINIC | Age: 68
End: 2023-03-06
Payer: MEDICARE

## 2023-03-06 VITALS
HEART RATE: 82 BPM | OXYGEN SATURATION: 95 % | RESPIRATION RATE: 16 BRPM | BODY MASS INDEX: 23.67 KG/M2 | SYSTOLIC BLOOD PRESSURE: 112 MMHG | WEIGHT: 169.06 LBS | HEIGHT: 71 IN | DIASTOLIC BLOOD PRESSURE: 62 MMHG

## 2023-03-06 DIAGNOSIS — R19.7 DIARRHEA, UNSPECIFIED TYPE: ICD-10-CM

## 2023-03-06 PROCEDURE — 99213 PR OFFICE/OUTPT VISIT, EST, LEVL III, 20-29 MIN: ICD-10-PCS | Mod: S$PBB,,, | Performed by: STUDENT IN AN ORGANIZED HEALTH CARE EDUCATION/TRAINING PROGRAM

## 2023-03-06 PROCEDURE — 99999 PR PBB SHADOW E&M-EST. PATIENT-LVL III: CPT | Mod: PBBFAC,,, | Performed by: STUDENT IN AN ORGANIZED HEALTH CARE EDUCATION/TRAINING PROGRAM

## 2023-03-06 PROCEDURE — 99999 PR PBB SHADOW E&M-EST. PATIENT-LVL III: ICD-10-PCS | Mod: PBBFAC,,, | Performed by: STUDENT IN AN ORGANIZED HEALTH CARE EDUCATION/TRAINING PROGRAM

## 2023-03-06 PROCEDURE — 99213 OFFICE O/P EST LOW 20 MIN: CPT | Mod: PBBFAC | Performed by: STUDENT IN AN ORGANIZED HEALTH CARE EDUCATION/TRAINING PROGRAM

## 2023-03-06 PROCEDURE — 99213 OFFICE O/P EST LOW 20 MIN: CPT | Mod: S$PBB,,, | Performed by: STUDENT IN AN ORGANIZED HEALTH CARE EDUCATION/TRAINING PROGRAM

## 2023-03-06 NOTE — ASSESSMENT & PLAN NOTE
Suspect related to recent antibiotic courses. ofcourse considering cdiff but stool color is not consistent with cdiff. Having mild cramps before the BM but not in between. Denies fever or chills. Will do cbc to check for infection and bmp to check for electrolytes. Asked pt to follow a bland diet with banana, toast, rice, mashed potatoes, soup. Also drink beverages with electrolytes and plenty water. Won't test stool for cdiff yet but if pt continues with diarrhea till Wednesday, will check for cdiff. Avoid imodium for now. Watch for signs of dehydration and explained to patient what these signs are. Vitals stable. Does not look toxic. Asked her to go the hospital if she develops severe cramps, fever, chills or blood diarrhea.

## 2023-03-06 NOTE — PROGRESS NOTES
Subjective:       Patient ID: Arben De Luna is a 68 y.o. female.    Chief Complaint: Diarrhea    Diarrhea   Pertinent negatives include no abdominal pain, chills, coughing, fever, headaches or vomiting.   Patient is a 67 yo female here to get evaluated for diarrhea. Started last Thursday but also had this a few weeks ago with diarrhea and vomiting for 2 days. This time it is just diarrhea. She also had BM in the middle of the night. She was going over 6 times a day but today was better, only went 4 times. Denies bloody stool. Stool is brown In color, watery. She had a root canal and was on antibiotics which she finished on feb 24th. Admits to stomach cramps but no fever or chills. Lost 13 pounds since jan 26th. Never had this in the past. She tried imodium which seemed to make It less explosive. She took it yesterday. She had total 2 rounds of antibiotics. One was amoxicillin started on 1/30 and one was augmentin started on 2/14 for 10 days. The first diarrhea started on day 3 of amoxicillin which lasted 2 days. This is going on for 5 days now which started after finishing augmentin. Do have to consider c-diff but stool Is not green, no fever, chills. Does have abd cramps. Did not out eat lately, no food that was suspicious.     Review of Systems   Constitutional:  Negative for chills and fever.   HENT:  Negative for postnasal drip, rhinorrhea and sore throat.    Respiratory:  Negative for cough, chest tightness and shortness of breath.    Cardiovascular:  Negative for chest pain.   Gastrointestinal:  Positive for diarrhea. Negative for abdominal pain, blood in stool, nausea and vomiting.   Genitourinary:  Negative for dysuria and hematuria.   Neurological:  Negative for dizziness, light-headedness and headaches.       Objective:      Physical Exam  Constitutional:       Appearance: Normal appearance. She is not ill-appearing or toxic-appearing.   Cardiovascular:      Rate and Rhythm: Normal rate and regular rhythm.       Pulses: Normal pulses.      Heart sounds: Normal heart sounds.   Pulmonary:      Effort: Pulmonary effort is normal. No respiratory distress.   Abdominal:      General: Bowel sounds are normal. There is no distension.      Palpations: Abdomen is soft.      Tenderness: There is no abdominal tenderness. There is no guarding.   Musculoskeletal:      Right lower leg: No edema.   Skin:     General: Skin is warm.   Neurological:      Mental Status: She is alert and oriented to person, place, and time.   Psychiatric:         Mood and Affect: Mood normal.         Behavior: Behavior normal.         Assessment and Plan:       1. Diarrhea, unspecified type  Assessment & Plan:  Suspect related to recent antibiotic courses. ofcourse considering cdiff but stool color is not consistent with cdiff. Having mild cramps before the BM but not in between. Denies fever or chills. Will do cbc to check for infection and bmp to check for electrolytes. Asked pt to follow a bland diet with banana, toast, rice, mashed potatoes, soup. Also drink beverages with electrolytes and plenty water. Won't test stool for cdiff yet but if pt continues with diarrhea till Wednesday, will check for cdiff. Avoid imodium for now. Watch for signs of dehydration and explained to patient what these signs are. Vitals stable. Does not look toxic. Asked her to go the hospital if she develops severe cramps, fever, chills or blood diarrhea.    Orders:  -     CBC Auto Differential; Future; Expected date: 03/06/2023  -     Basic Metabolic Panel; Future; Expected date: 03/06/2023       I spent a total of 20 minutes on the day of the visit.  This includes face to face time and non-face to face time preparing to see the patient (eg, review of tests), obtaining and/or reviewing separately obtained history, documenting clinical information in the electronic or other health record, independently interpreting results and communicating results to the  patient/family/caregiver, or care coordinator.

## 2023-03-07 ENCOUNTER — TELEPHONE (OUTPATIENT)
Dept: INTERNAL MEDICINE | Facility: CLINIC | Age: 68
End: 2023-03-07
Payer: MEDICARE

## 2023-03-07 ENCOUNTER — LAB VISIT (OUTPATIENT)
Dept: LAB | Facility: OTHER | Age: 68
End: 2023-03-07
Attending: STUDENT IN AN ORGANIZED HEALTH CARE EDUCATION/TRAINING PROGRAM
Payer: MEDICARE

## 2023-03-07 DIAGNOSIS — R19.7 DIARRHEA, UNSPECIFIED TYPE: ICD-10-CM

## 2023-03-07 LAB
ANION GAP SERPL CALC-SCNC: 11 MMOL/L (ref 8–16)
BASOPHILS # BLD AUTO: 0.06 K/UL (ref 0–0.2)
BASOPHILS NFR BLD: 0.7 % (ref 0–1.9)
BUN SERPL-MCNC: 10 MG/DL (ref 8–23)
CALCIUM SERPL-MCNC: 9.4 MG/DL (ref 8.7–10.5)
CHLORIDE SERPL-SCNC: 104 MMOL/L (ref 95–110)
CO2 SERPL-SCNC: 24 MMOL/L (ref 23–29)
CREAT SERPL-MCNC: 0.8 MG/DL (ref 0.5–1.4)
DIFFERENTIAL METHOD: ABNORMAL
EOSINOPHIL # BLD AUTO: 0.2 K/UL (ref 0–0.5)
EOSINOPHIL NFR BLD: 2.4 % (ref 0–8)
ERYTHROCYTE [DISTWIDTH] IN BLOOD BY AUTOMATED COUNT: 12.7 % (ref 11.5–14.5)
EST. GFR  (NO RACE VARIABLE): >60 ML/MIN/1.73 M^2
GLUCOSE SERPL-MCNC: 123 MG/DL (ref 70–110)
HCT VFR BLD AUTO: 42.4 % (ref 37–48.5)
HGB BLD-MCNC: 14 G/DL (ref 12–16)
IMM GRANULOCYTES # BLD AUTO: 0.06 K/UL (ref 0–0.04)
IMM GRANULOCYTES NFR BLD AUTO: 0.7 % (ref 0–0.5)
LYMPHOCYTES # BLD AUTO: 1.4 K/UL (ref 1–4.8)
LYMPHOCYTES NFR BLD: 15.1 % (ref 18–48)
MCH RBC QN AUTO: 30.9 PG (ref 27–31)
MCHC RBC AUTO-ENTMCNC: 33 G/DL (ref 32–36)
MCV RBC AUTO: 94 FL (ref 82–98)
MONOCYTES # BLD AUTO: 0.6 K/UL (ref 0.3–1)
MONOCYTES NFR BLD: 6.9 % (ref 4–15)
NEUTROPHILS # BLD AUTO: 6.7 K/UL (ref 1.8–7.7)
NEUTROPHILS NFR BLD: 74.2 % (ref 38–73)
NRBC BLD-RTO: 0 /100 WBC
PLATELET # BLD AUTO: 288 K/UL (ref 150–450)
PMV BLD AUTO: 10.1 FL (ref 9.2–12.9)
POTASSIUM SERPL-SCNC: 3.7 MMOL/L (ref 3.5–5.1)
RBC # BLD AUTO: 4.53 M/UL (ref 4–5.4)
SODIUM SERPL-SCNC: 139 MMOL/L (ref 136–145)
WBC # BLD AUTO: 8.99 K/UL (ref 3.9–12.7)

## 2023-03-07 PROCEDURE — 80048 BASIC METABOLIC PNL TOTAL CA: CPT | Performed by: STUDENT IN AN ORGANIZED HEALTH CARE EDUCATION/TRAINING PROGRAM

## 2023-03-07 PROCEDURE — 36415 COLL VENOUS BLD VENIPUNCTURE: CPT | Performed by: STUDENT IN AN ORGANIZED HEALTH CARE EDUCATION/TRAINING PROGRAM

## 2023-03-07 PROCEDURE — 85025 COMPLETE CBC W/AUTO DIFF WBC: CPT | Performed by: STUDENT IN AN ORGANIZED HEALTH CARE EDUCATION/TRAINING PROGRAM

## 2023-03-07 NOTE — TELEPHONE ENCOUNTER
----- Message from Darrell Ochoa MD sent at 3/7/2023  1:26 PM CST -----  Let patient know that her bloodwork looks good

## 2023-03-10 ENCOUNTER — OFFICE VISIT (OUTPATIENT)
Dept: OBSTETRICS AND GYNECOLOGY | Facility: CLINIC | Age: 68
End: 2023-03-10
Attending: INTERNAL MEDICINE
Payer: MEDICARE

## 2023-03-10 VITALS
SYSTOLIC BLOOD PRESSURE: 129 MMHG | BODY MASS INDEX: 23.77 KG/M2 | WEIGHT: 169.75 LBS | HEIGHT: 71 IN | DIASTOLIC BLOOD PRESSURE: 74 MMHG

## 2023-03-10 DIAGNOSIS — N90.89 LABIAL IRRITATION: Primary | ICD-10-CM

## 2023-03-10 DIAGNOSIS — N95.0 PMB (POSTMENOPAUSAL BLEEDING): ICD-10-CM

## 2023-03-10 PROCEDURE — 88305 TISSUE EXAM BY PATHOLOGIST: CPT | Performed by: PATHOLOGY

## 2023-03-10 PROCEDURE — 88305 TISSUE EXAM BY PATHOLOGIST: CPT | Mod: 26,,, | Performed by: PATHOLOGY

## 2023-03-10 PROCEDURE — 99999 PR PBB SHADOW E&M-EST. PATIENT-LVL III: CPT | Mod: PBBFAC,,, | Performed by: OBSTETRICS & GYNECOLOGY

## 2023-03-10 PROCEDURE — 99204 PR OFFICE/OUTPT VISIT, NEW, LEVL IV, 45-59 MIN: ICD-10-PCS | Mod: 25,S$PBB,, | Performed by: OBSTETRICS & GYNECOLOGY

## 2023-03-10 PROCEDURE — 88305 TISSUE EXAM BY PATHOLOGIST: ICD-10-PCS | Mod: 26,,, | Performed by: PATHOLOGY

## 2023-03-10 PROCEDURE — 58100 BIOPSY OF UTERUS LINING: CPT | Mod: PBBFAC | Performed by: OBSTETRICS & GYNECOLOGY

## 2023-03-10 PROCEDURE — 58100 ENDOMETRIAL BIOPSY: ICD-10-PCS | Mod: S$PBB,,, | Performed by: OBSTETRICS & GYNECOLOGY

## 2023-03-10 PROCEDURE — 99204 OFFICE O/P NEW MOD 45 MIN: CPT | Mod: 25,S$PBB,, | Performed by: OBSTETRICS & GYNECOLOGY

## 2023-03-10 PROCEDURE — 99213 OFFICE O/P EST LOW 20 MIN: CPT | Mod: PBBFAC | Performed by: OBSTETRICS & GYNECOLOGY

## 2023-03-10 PROCEDURE — 99999 PR PBB SHADOW E&M-EST. PATIENT-LVL III: ICD-10-PCS | Mod: PBBFAC,,, | Performed by: OBSTETRICS & GYNECOLOGY

## 2023-03-10 RX ORDER — CLOTRIMAZOLE AND BETAMETHASONE DIPROPIONATE 10; .64 MG/G; MG/G
CREAM TOPICAL
Qty: 45 G | Refills: 3 | Status: SHIPPED | OUTPATIENT
Start: 2023-03-10 | End: 2023-05-22

## 2023-03-10 NOTE — PROCEDURES
Endometrial biopsy    Date/Time: 3/10/2023 2:30 PM  Performed by: Orlando Matthews Jr., MD  Authorized by: Orlando Matthews Jr., MD     Consent:     Consent obtained:  Verbal    Consent given by:  Patient    Patient questions answered: yes      Patient agrees, verbalizes understanding, and wants to proceed: yes      Educational handouts given: no      Instructions and paperwork completed: no    Indication:     Indications: Post-menopausal bleeding    Pre-procedure:     Pre-procedure timeout performed: yes    Procedure:     Procedure: endometrial biopsy with Pipelle      Cervix cleaned and prepped: no      A paracervical block was performed: no      An intracervical block was performed: no      The cervix was dilated: no      Uterus sounded: yes      Uterus sound depth (cm):  5    Specimen collected: specimen collected and sent to pathology      Patient tolerated procedure well with no complications: yes

## 2023-03-10 NOTE — PROGRESS NOTES
ENDOMETRIAL BIOPSY   SHOWS SCANT FRAGMENTS OF MUCOID MATERIAL INSUFFICIENT FOR   DIAGNOSIS.    PT SEEN BY PCP AND SENT TO GYN.  ONE TIME WITH SEVERE SP PAIN AND PAIN WITH URINATION HAD BRB ON THE TISSUE ONE TIME.  NO FURTHER PAIN. SISTER  OF ENDOMETRIAL CANCER.  HAD 3 DAYS OF LABIAL IRRITATION.    2023 U/S  Uterus:  Size: 4 x 3 x 4 cm  Masses: None  Endometrium: Small amount of free fluid within the endometrial cavity.  Thickness is normal at 2 mm in this post menopausal patient.  Neither ovary visualized with certainty.  Free Fluid:  None.  Impression:  No sonographic abnormality.    ROS:  GENERAL: No fever, chills, fatigability or weight loss.  VULVAR: No pain, no lesions and no itching.  VAGINAL: No relaxation, no itching, no discharge, no abnormal bleeding and no lesions.  ABDOMEN: No abdominal pain. Denies nausea. Denies vomiting. No diarrhea. No constipation  BREAST: Denies pain. No lumps. No discharge.  URINARY: No incontinence, no nocturia, no frequency and no dysuria.  CARDIOVASCULAR: No chest pain. No shortness of breath. No leg cramps.  NEUROLOGICAL: No headaches. No vision changes.  The remainder of the review of systems was negative.    PE:  General Appearance: normal weight And Well developed. Well nourished. In no acute distress.  Vulva: Lesions: No.  Urethral Meatus: Normal size. Normal location. No lesions. No prolapse.  Urethra: No masses. No tenderness. No prolapse. No scarring.  Bladder: No masses. No tenderness.  Vagina: Mucosa NI:yes discharge no, atrophy yes, cystocele no or rectocele no.  Cervix: Lesion: no  Stenotic: no Cervical motion tenderness: no  Uterus: Uterus size: 6 weeks. Support good. Uterus size: Normal  Adnexa: Masses: No Tenderness: No CDS Nodularity: No  Abdomen: normal weight No masses. No tenderness.  CHEST: CTA B  HEART: RRR  EXT: NO EDEMA    PROCEDURES:    PLAN:     DIAGNOSIS:  1. Labial irritation    2. PMB (postmenopausal bleeding)        MEDICATIONS &  ORDERS:  Orders Placed This Encounter    Endometrial biopsy    Specimen to Pathology Gynecology and Obstetrics    clotrimazole-betamethasone 1-0.05% (LOTRISONE) cream         FOLLOW-UP: With me PRMAIA Matthews Jr, MD, FACOG

## 2023-03-15 ENCOUNTER — PES CALL (OUTPATIENT)
Dept: ADMINISTRATIVE | Facility: CLINIC | Age: 68
End: 2023-03-15
Payer: MEDICARE

## 2023-03-17 LAB
FINAL PATHOLOGIC DIAGNOSIS: NORMAL
GROSS: NORMAL
Lab: NORMAL

## 2023-03-21 ENCOUNTER — OFFICE VISIT (OUTPATIENT)
Dept: INTERNAL MEDICINE | Facility: CLINIC | Age: 68
End: 2023-03-21
Attending: INTERNAL MEDICINE
Payer: MEDICARE

## 2023-03-21 VITALS
BODY MASS INDEX: 24.72 KG/M2 | OXYGEN SATURATION: 97 % | WEIGHT: 176.56 LBS | HEIGHT: 71 IN | SYSTOLIC BLOOD PRESSURE: 126 MMHG | DIASTOLIC BLOOD PRESSURE: 68 MMHG | HEART RATE: 69 BPM

## 2023-03-21 DIAGNOSIS — Z98.890 HISTORY OF ROOT CANAL PROCEDURE: Primary | ICD-10-CM

## 2023-03-21 DIAGNOSIS — Z92.89 HISTORY OF ENDOMETRIAL BIOPSY: ICD-10-CM

## 2023-03-21 PROCEDURE — 99213 PR OFFICE/OUTPT VISIT, EST, LEVL III, 20-29 MIN: ICD-10-PCS | Mod: S$PBB,,, | Performed by: INTERNAL MEDICINE

## 2023-03-21 PROCEDURE — 99213 OFFICE O/P EST LOW 20 MIN: CPT | Mod: PBBFAC | Performed by: INTERNAL MEDICINE

## 2023-03-21 PROCEDURE — 99999 PR PBB SHADOW E&M-EST. PATIENT-LVL III: CPT | Mod: PBBFAC,,, | Performed by: INTERNAL MEDICINE

## 2023-03-21 PROCEDURE — 99999 PR PBB SHADOW E&M-EST. PATIENT-LVL III: ICD-10-PCS | Mod: PBBFAC,,, | Performed by: INTERNAL MEDICINE

## 2023-03-21 PROCEDURE — 99213 OFFICE O/P EST LOW 20 MIN: CPT | Mod: S$PBB,,, | Performed by: INTERNAL MEDICINE

## 2023-03-21 NOTE — PROGRESS NOTES
"dSubjective:   Patient ID: Arben De Luna is a 68 y.o. female  Chief complaint:   Chief Complaint   Patient presents with    Diarrhea     Watery diarrhea constantly since last visit       HPI  Here for follow up of facial swelling   Was tx for sinus infection 1/10/2023 - here for sx recheck     Seen by gyn 3/10/2023 - had ebx - no recurrence - to f/u if any further episodes of bleeding   Transvag US 2/2023 completed     Got sick when visiting son during [place]     Had root canal and reports area was very infected 2/14 at left molar where swelling was first noticed by her    Had another course of abx - amox     Noticed swelling on left aspect of face has decreased since root canal     Review of Systems    Objective:  Vitals:    03/21/23 1435   BP: 126/68   BP Location: Left arm   Patient Position: Sitting   Pulse: 69   SpO2: 97%   Weight: 80.1 kg (176 lb 9.4 oz)   Height: 5' 11" (1.803 m)     Body mass index is 24.63 kg/m².    Physical Exam  Constitutional:       General: She is not in acute distress.     Appearance: She is well-developed. She is not diaphoretic.   HENT:      Mouth/Throat:      Comments: Swelling at left max now improved   No palpable masses   Eyes:      General:         Right eye: No discharge.         Left eye: No discharge.      Conjunctiva/sclera: Conjunctivae normal.   Pulmonary:      Effort: Pulmonary effort is normal. No respiratory distress.   Lymphadenopathy:      Head:      Right side of head: No submental, submandibular, tonsillar, preauricular, posterior auricular or occipital adenopathy.      Left side of head: No submental, submandibular, tonsillar, preauricular, posterior auricular or occipital adenopathy.      Cervical: Cervical adenopathy present.      Right cervical: No superficial cervical adenopathy.     Upper Body:      Right upper body: No supraclavicular, axillary or epitrochlear adenopathy.      Left upper body: No supraclavicular, axillary or epitrochlear adenopathy. "   Skin:     General: Skin is warm.      Findings: No erythema.   Neurological:      Mental Status: She is alert and oriented to person, place, and time.   Psychiatric:         Behavior: Behavior normal.         Thought Content: Thought content normal.         Judgment: Judgment normal.       Assessment:  1. History of root canal procedure    2. History of endometrial biopsy        Plan:  1. History of root canal procedure    2. History of endometrial biopsy    F/u with gyn if recurrence   Facial swelling now improved s/p root canal - no lad on exam   If any new or worsening sx then will let me know and f/u for re-examination     Health Maintenance   Topic Date Due    TETANUS VACCINE  Never done    Mammogram  02/11/2023    DEXA Scan  01/27/2026    Lipid Panel  01/27/2028    Hepatitis C Screening  Completed

## 2023-03-24 ENCOUNTER — CLINICAL SUPPORT (OUTPATIENT)
Dept: ENDOSCOPY | Facility: HOSPITAL | Age: 68
End: 2023-03-24
Attending: INTERNAL MEDICINE
Payer: MEDICARE

## 2023-03-24 VITALS — HEIGHT: 70 IN | WEIGHT: 172 LBS | BODY MASS INDEX: 24.62 KG/M2

## 2023-03-24 DIAGNOSIS — Z12.11 SCREENING FOR COLON CANCER: ICD-10-CM

## 2023-03-24 RX ORDER — SODIUM, POTASSIUM,MAG SULFATES 17.5-3.13G
SOLUTION, RECONSTITUTED, ORAL ORAL
Qty: 1 KIT | Refills: 0 | Status: SHIPPED | OUTPATIENT
Start: 2023-03-24

## 2023-04-06 ENCOUNTER — OFFICE VISIT (OUTPATIENT)
Dept: PSYCHIATRY | Facility: CLINIC | Age: 68
End: 2023-04-06
Payer: MEDICARE

## 2023-04-06 VITALS
HEART RATE: 69 BPM | WEIGHT: 174.81 LBS | SYSTOLIC BLOOD PRESSURE: 142 MMHG | DIASTOLIC BLOOD PRESSURE: 66 MMHG | HEIGHT: 70 IN | BODY MASS INDEX: 25.03 KG/M2

## 2023-04-06 DIAGNOSIS — F41.9 ANXIETY: ICD-10-CM

## 2023-04-06 DIAGNOSIS — F33.42 RECURRENT MAJOR DEPRESSIVE DISORDER, IN FULL REMISSION: Primary | ICD-10-CM

## 2023-04-06 PROCEDURE — 99212 OFFICE O/P EST SF 10 MIN: CPT | Mod: PBBFAC | Performed by: STUDENT IN AN ORGANIZED HEALTH CARE EDUCATION/TRAINING PROGRAM

## 2023-04-06 PROCEDURE — 99999 PR PBB SHADOW E&M-EST. PATIENT-LVL II: ICD-10-PCS | Mod: PBBFAC,,, | Performed by: STUDENT IN AN ORGANIZED HEALTH CARE EDUCATION/TRAINING PROGRAM

## 2023-04-06 PROCEDURE — 90792 PR PSYCHIATRIC DIAGNOSTIC EVALUATION W/MEDICAL SERVICES: ICD-10-PCS | Mod: ,,, | Performed by: STUDENT IN AN ORGANIZED HEALTH CARE EDUCATION/TRAINING PROGRAM

## 2023-04-06 PROCEDURE — 99999 PR PBB SHADOW E&M-EST. PATIENT-LVL II: CPT | Mod: PBBFAC,,, | Performed by: STUDENT IN AN ORGANIZED HEALTH CARE EDUCATION/TRAINING PROGRAM

## 2023-04-06 PROCEDURE — 90792 PSYCH DIAG EVAL W/MED SRVCS: CPT | Mod: ,,, | Performed by: STUDENT IN AN ORGANIZED HEALTH CARE EDUCATION/TRAINING PROGRAM

## 2023-04-06 NOTE — PROGRESS NOTES
[facility]  DEPARTMENT OF PSYCHIATRY    INITIAL PSYCHIATRIC EVALUATION  Outpatient Clinic    EXAMINING PRACTITIONER: Batsheva SCHMIDT MD      KEY:     I[]I Y = II[x][]II = Yes / Present / Present Though Denies / Endorses  I[]I N = II[][x]II = No / Absent / Absent Though Endorses / Denies  I[]I U = II[][]II = Unknown / Unable to Assess/Enact / Unwilling to Participate/Provide  I[]I A = II[x][x]II = Ambiguity / Uncertainty of Accuracy Exists  I[]I D = Denial or Minimization is Suspected/Evident  I[]I N/A = Non-Applicable      CHIEF COMPLAINT:     Patient Name: Arben De Luna  YOB: 1955  MRN: [id number]    Arben De Luna is a 68 y.o. female who is being seen by me for an initial psychiatric evaluation.  Arben De Luna presents with the chief complain anxiety/depression    CHART REVIEW:     Available documentation has been reviewed, and pertinent elements of the chart have been incorporated into this evaluation where appropriate.      PRESENTATION:     HISTORY OF PRESENT ILLNESS:     In Summary:  Ms. De Luna is a 69 y/o F presenting to [facility]. Has been on an antidepressant since she was 40, 41. Has not taken a break since then. She has been on venlafaxine for about 10 years. She was considering trying to get off of the medication- she has tapered down significantly. Tried to get off of it entirely, but felt weepy and emotional. She also has felt irritability, impatience, and anger. She then restarted 37.5 mg.   She doesn't have too many clear reasons about why she wanted to get off of the medication- just felt like she had been on it for a long time, she had been feeling good. She did not feel like she had physical symptoms when she was withdrawing.     Mood has been pretty good. Every once in a while, she will feel really optimistic/get excitable. She has become more aware of this part of her nature. 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   She used to have anxiety, feels like that has  "been remedied. She also states she has not felt depressed.     Pt is hard of hearing, wears hearing aids.    was a , he stopped working when he moved here.     She wonders whether she would feel life more "authentically" without her medication. She states she doesn't feel super happy very often.   She was the 9th child of 12!  She moved from [place]. She has 2 children, 1 grandchild, another on the way.   She lived in [place] for a while, she states its beautiful. About 10-12 years ago, her daughter moved down to [place]. She moved about 5 years ago.   She had her second child at 34, likely had postpartum depression. Felt irritable and impatient.She had a really stressful job, felt a lot of stress and anxiety.   States her memory is "terrible."  feels like her memory is getting worse. She is good at computing with numbers, but can't remember them.             .  REVIEW OF SYSTEMS:     A pertinent medical review of systems was performed.    Review of Systems    II[][x]II pain: denies  II[][x]II cardiac symptoms: denies  II[][x]II abnormal movements: denies    PSYCHIATRIC ROS:     II[][x]II sleep disturbance: denies  II[][x]II appetite/weight change: denies   II[][x]II fatigue/anergia: denies  II[x][]II impairment in focus/concentration: still endorses- has intermittent issues with focus. No issues with lateness or time management.     II[x][x]II depression:  Positive for: intermittent sadness, tearfulness Negative for: depressed mood, dysphoria, dysthymia, anhedonia, amotivation, excessive or inappropriate guilt, helplessness, hopelessness  II[][x]II anxiety/worry: endorsed hx of anxiety but not currently  II[][x]II dysregulated mood/behavior:  denies  II[][x]II manic symptomatology: denies  II[][x]II psychosis: denies      HISTORY:     I[x]I Y  I[]I N  I[]I U  Psychiatric Diagnoses: major depressive disorder  I[]I Y  I[x]I N  I[]I U  Current Psychiatric Provider (if Applicable):   I[]I " Y  I[x]I N  I[]I U  Hx of Psychiatric Hospitalization:   I[x]I Y  I[]I N  I[]I U  Hx of Outpatient Psychiatric Treatment (psychiatry/psychotherapy):   I[]I Y  I[]I N  I[]I U  Psychotropic Trials: effexor, zoloft   I[]I Y  I[x]I N  I[]I U  Prior Suicide Attempts:   I[x]I Y  I[]I N  I[]I U  Hx of Suicidal Ideation: passive suicidal ideation   I[]I Y  I[x]I N  I[]I U  Hx of Homicidal Ideation:   I[]I Y  I[x]I N  I[]I U  Hx of Self-Injurious Behavior (Non-Suicidal):   I[]I Y  I[x]I N  I[]I U  Hx of Violence:   I[]I Y  I[x]I N  I[]I U  Documented Hx of Malingering:     SUBSTANCE USE:   Only drinks tea.   No cigarettes, no other nicotine.   Drinks about 5 drinks a week.   has a medical marijuana prescription. Smokes every day, just in the evenings.     TRAUMA:     I[x]I Patient denies any history of trauma, abuse or neglect, and none is known.  I[]I Patient unable or unwilling to provide any history of trauma, abuse, or neglect.      FAMILY:   Had a very big family, she was the 9th youngest of 10.       SOCIAL:     I[]I Patient unable or unwilling to provide any social history.    II[][x]II Grew Up Locally?:   II[x][x]II Happy Childhood?: complex relationship with family, had 9 siblings  II[][x]II Significant Developmental Delay/Disability?:   II[x][]II GED/High School Dipoloma?:   II[x][]II Post High School Education?:   II[x][]II Currently Employed?:   II[][x]II On or Applying for Disability?:   II[x][]II Functions Independently?:   II[x][]II Financially Stable?:   II[x][]II Domiciled?:   II[][x]II Lives Alone?:   II[x][]II Heterosexual/Cisgender?:   II[x][]II Currently in a Romantic Relationship?:   II[x][]II Ever ?:   II[x][]II Children/Dependents?:   II[][x]II Church/Spiritual?:   II[][x]II  History?:   II[x][]II Engaged in Hobbies/Recreational Activities?: reading   II[][x]II Access to a Gun?: denies      LEGAL:     I[x]I Patient denies any legal history, and none is known.  I[]I Patient  unable or unwilling to provide any legal history.      MEDICAL:     The patient's past medical history has been reviewed and updated as appropriate within the electronic medical record system.    General Medical History:       NEUROLOGIC:     I[]I Y  I[x]I N  I[]I U  Hx of Seizure:   I[]I Y  I[x]I N  I[]I U  Hx of Significant Head Trauma (e.g., Loss of Consciousness, Concussion, Coma):        MEDICATIONS:     Current Psychotropic Medications:     Effexor 37.5    Scheduled and PRN Medications:   The electronic chart was reviewed and updated as appropriate.  See [employer] for details.  has a current medication list which includes the following prescription(s): albuterol, calcium carbonate, clotrimazole-betamethasone 1-0.05%, sodium,potassium,mag sulfates, and venlafaxine.    ALLERGIES:   Patient has no known allergies.    RISK:     RELIABILITY, ACCURACY & AGENCY:     The patient is deemed to be a reliable and factually accurate historian.    Inconsistencies between patient reporting, collateral information, and/or chart review are absent.    Legal Status: The patient is currently here on a voluntary legal status.    MITIGATION:     Risk Mitigation Strategies, Harm Reduction Techniques, and Safety Netting are important interventions that can reduce acute and chronic risk, and as such opportunities were sought to incorporate them into the encounter, to the degree possible.  Written material has been provided to supplement, augment, and reinforce any discussions and interventions, via the AVS or other pre-printed handouts.    PRESCRIPTION DRUG MONITORING:     LA/MS  AWARE  Site reviewed - No recent discrepancies or irregularities are noted.      FIREARMS:     Access to Firearms:   See above for screening on access to firearms.  NOTE: patient counseled on gun safety.  NOTE: patient counseled on increased risks associated with gun ownership.      III[x]III  RISK PARAMETERS:     The following risk parameters were assessed  during this evaluation:    She denies SI, HI, AVH, self harm, shaun, psychosis.     III[x]III  CLINICAL RISK ASSESSMENT:      - the patient was able to satisfactorily contract for safety  - the patient was noted to be future oriented     CO-Dueunqin-Wqgstukoiamuxpm: Currently does not meet or exceed the threshold for psychiatric hospitalization, as the patient can be managed safely and successfully in a less restrictive level of care or the community.    III[x]III  CLINICAL RISK DETERMINATION:     Current risk is judged to be:  I[x]I Low    I[]I Moderate   I[]I High    The following criteria were met for involuntary psychiatric admission:   I[x]I None    I[]I Dangerous to Self    I[]I Dangerous to Others    I[]I Gravely Disabled      EXAMINATION:     VITALS:     There were no vitals taken for this visit.    MENTAL STATUS EXAMINATION:     Mental Status Exam:  Appearance: tall, unremarkable, casually dressed, appropriate, appears stated age  Behavior/Cooperation:  normal, cooperative  Speech: normal tone, normal rate, normal pitch, normal volume  Language: uses words appropriately; NO aphasia or dysarthria  Mood: steady, euthymic  Affect:  congruent with mood and appropriate to situation/content   Thought Process: normal and logical  Thought Content: normal, no suicidality, no homicidality, delusions, or paranoia  Level of Consciousness: Alert and Oriented x3  Memory:  Intact  Attention/concentration: appropriate for age/education.   Fund of Knowledge: appears adequate  Insight:   Intact  Judgment: Intact      ASSESSMENT:     A diagnostic psychiatric evaluation was performed and responsiveness to treatment was assessed.  The patient demonstrates adequate ability/capacity to respond to treatment.        III[x]III  INITIAL DIAGNOSES AND PROBLEMS:             .  Recurrent major depressive disorder, in full remission  Unspecified anxiety d/o     PLAN:     IMPRESSION AND RECOMMENDATIONS:     MANAGEMENT PLAN, TREATMENT  GOALS, THERAPEUTIC TECHNIQUES/APPROACHES & CLINICAL REASONING:     Pt 69 y/o F with hx of depression in remission, anxiety. Wished to have her medication evaluated.  Has been stable on low dose of effexor, having titrated down. Denies SE of this medication. Reviewed posisble risks and side effects with her.  Struggling with some existential/life change anxiety and distress; recommended therapy to discuss the changes of aging and life.   No SI, HI, AVH  Recommend continuing current antidepressant/anxiolytic as pt has not tolerated a full wean in the past  She denies SI, HI, aVH  Made psychology referral  Return PRN   Reviewed ED precautions     .  III[x]III  PRESCRIPTION DRUG MANAGEMENT:     Prescription Drug Management entails the review, recommendation, or consideration without recommendation of medications, and as such was employed during the encounter.    Discussed, to the extent possible, diagnosis, risks and benefits of proposed treatment vs alternative treatments vs no treatment, potential side effects of these treatments and the inherent unpredictability of treatment. The patient expresses understanding of the above and displays the capacity to agree with this treatment given said understanding. Patient also agrees that, currently, the benefits outweigh the risks and consents to treatment at this time.     Written material has additionally been provided, via the AVS or other pre-printed handouts.        MEDICAL DECISION MAKING:     Shared medical decision making and informed consent are the hallmark and bedrock of good clinical care, and as such have been employed and obtained, respectively, to the degree possible.  Written material has been provided to supplement, augment, and reinforce any discussions and interventions, via the AVS or other pre-printed handouts.    Additional psychoeducation has been provided, as warranted.      LEVEL OF MEDICAL DECISION MAKING AND TIME:     The total time for services  performed on the date of the encounter: 56 minutes       Batsheva Gonzáles MD  Department of Psychiatry  [facility]

## 2023-04-18 ENCOUNTER — HOSPITAL ENCOUNTER (OUTPATIENT)
Dept: RADIOLOGY | Facility: OTHER | Age: 68
Discharge: HOME | End: 2023-04-18
Attending: INTERNAL MEDICINE
Payer: MEDICARE

## 2023-04-18 DIAGNOSIS — Z12.31 ENCOUNTER FOR SCREENING MAMMOGRAM FOR MALIGNANT NEOPLASM OF BREAST: ICD-10-CM

## 2023-04-18 PROCEDURE — 77067 SCR MAMMO BI INCL CAD: CPT | Mod: 26,,, | Performed by: RADIOLOGY

## 2023-04-18 PROCEDURE — 77067 SCR MAMMO BI INCL CAD: CPT | Mod: TC

## 2023-04-18 PROCEDURE — 77063 MAMMO DIGITAL SCREENING BILAT WITH TOMO: ICD-10-PCS | Mod: 26,,, | Performed by: RADIOLOGY

## 2023-04-18 PROCEDURE — 77067 MAMMO DIGITAL SCREENING BILAT WITH TOMO: ICD-10-PCS | Mod: 26,,, | Performed by: RADIOLOGY

## 2023-04-18 PROCEDURE — 77063 BREAST TOMOSYNTHESIS BI: CPT | Mod: 26,,, | Performed by: RADIOLOGY

## 2023-04-27 ENCOUNTER — TELEPHONE (OUTPATIENT)
Dept: ENDOSCOPY | Facility: HOSPITAL | Age: 68
End: 2023-04-27
Payer: MEDICARE

## 2023-04-28 ENCOUNTER — PATIENT MESSAGE (OUTPATIENT)
Dept: ENDOSCOPY | Facility: HOSPITAL | Age: 68
End: 2023-04-28
Payer: MEDICARE

## 2023-04-28 NOTE — TELEPHONE ENCOUNTER
----- Message from Ashli Martino sent at 4/27/2023  3:51 PM CDT -----  Arben De Luna calling regarding Appointment Access  for needing to reschedule the colonoscopy that is scheduled for 05/05/23, call back [phone]

## 2023-05-12 ENCOUNTER — PES CALL (OUTPATIENT)
Dept: ADMINISTRATIVE | Facility: CLINIC | Age: 68
End: 2023-05-12
Payer: MEDICARE

## 2023-05-22 ENCOUNTER — TELEPHONE (OUTPATIENT)
Dept: DERMATOLOGY | Facility: CLINIC | Age: 68
End: 2023-05-22
Payer: MEDICARE

## 2023-05-22 ENCOUNTER — OFFICE VISIT (OUTPATIENT)
Dept: URGENT CARE | Facility: CLINIC | Age: 68
End: 2023-05-22
Payer: MEDICARE

## 2023-05-22 VITALS
TEMPERATURE: 98 F | HEART RATE: 70 BPM | BODY MASS INDEX: 24.48 KG/M2 | OXYGEN SATURATION: 98 % | DIASTOLIC BLOOD PRESSURE: 75 MMHG | WEIGHT: 171 LBS | SYSTOLIC BLOOD PRESSURE: 125 MMHG | HEIGHT: 70 IN

## 2023-05-22 DIAGNOSIS — L23.9 ALLERGIC CONTACT DERMATITIS, UNSPECIFIED TRIGGER: Primary | ICD-10-CM

## 2023-05-22 DIAGNOSIS — L01.00 IMPETIGO: ICD-10-CM

## 2023-05-22 PROCEDURE — 99214 PR OFFICE/OUTPT VISIT, EST, LEVL IV, 30-39 MIN: ICD-10-PCS | Mod: S$GLB,,, | Performed by: NURSE PRACTITIONER

## 2023-05-22 PROCEDURE — 99214 OFFICE O/P EST MOD 30 MIN: CPT | Mod: S$GLB,,, | Performed by: NURSE PRACTITIONER

## 2023-05-22 RX ORDER — MUPIROCIN 20 MG/G
OINTMENT TOPICAL 3 TIMES DAILY
Qty: 2 G | Refills: 0 | Status: SHIPPED | OUTPATIENT
Start: 2023-05-22

## 2023-05-22 NOTE — TELEPHONE ENCOUNTER
----- Message from Bradley Martínez sent at 5/22/2023  8:13 AM CDT -----  Regarding: same day appt  Contact: pt  Pt requesting urgent call back RE: would like to schedule appt for today for bug bites, nothing available until 8/18\    Confirmed contact below:  Contact Name:Arben De Luna  Phone Number: [phone]

## 2023-05-22 NOTE — PROGRESS NOTES
"Subjective:      Patient ID: Arben De Luna is a 68 y.o. female.    Vitals:  height is 5' 10" (1.778 m) and weight is 77.6 kg (171 lb). Her temperature is 97.8 °F (36.6 °C). Her blood pressure is 125/75 and her pulse is 70. Her oxygen saturation is 98%.     Chief Complaint: Rash (Rash is itchy)    Patient have having a rash on her right arm for approximally 10 days.  Patient started using a benadryl cream today and it is helping.      Skin:  Positive for rash.    Objective:     Physical Exam   Constitutional: She is oriented to person, place, and time.   HENT:   Head: Normocephalic and atraumatic.   Cardiovascular: Normal rate.   Pulmonary/Chest: Breath sounds normal.   Abdominal: Normal appearance.   Musculoskeletal: Normal range of motion.         General: Normal range of motion.   Neurological: She is alert and oriented to person, place, and time.   Skin: Skin is warm, dry and rash.        Psychiatric: Her behavior is normal. Mood normal.   Nursing note and vitals reviewed.    Assessment:     1. Allergic contact dermatitis, unspecified trigger    2. Impetigo        Plan:       Allergic contact dermatitis, unspecified trigger  -     mupirocin (BACTROBAN) 2 % ointment; Apply topically 3 (three) times daily.  Dispense: 2 g; Refill: 0    Impetigo    Follow up with primary care provider if not improved  Go to ER for new, worse or concerning symptoms  Keep area clean and dry  Continue using triamcinalone cream as needed for itching  Take Cetirizine (Zyrtec) daily as needed for itching                "

## 2023-05-22 NOTE — TELEPHONE ENCOUNTER
----- Message from Darrel Lombardo sent at 5/22/2023  1:29 PM CDT -----  Regarding: adv  Contact: [phone]  Pt returning call to arti... pls call and adv@[phone]

## 2023-05-22 NOTE — PATIENT INSTRUCTIONS
Follow up with primary care provider if not improved  Go to ER for new, worse or concerning symptoms  Keep area clean and dry  Continue using triamcinalone cream as needed for itching  Take Cetirizine (Zyrtec) daily as needed for itching

## 2023-07-12 ENCOUNTER — PATIENT MESSAGE (OUTPATIENT)
Dept: INTERNAL MEDICINE | Facility: CLINIC | Age: 68
End: 2023-07-12
Payer: MEDICARE

## 2023-07-12 DIAGNOSIS — R22.0 SWOLLEN CHEEK: Primary | ICD-10-CM

## 2023-07-14 ENCOUNTER — TELEPHONE (OUTPATIENT)
Dept: INTERNAL MEDICINE | Facility: CLINIC | Age: 68
End: 2023-07-14
Payer: MEDICARE

## 2023-07-31 ENCOUNTER — ANESTHESIA (OUTPATIENT)
Dept: ENDOSCOPY | Facility: HOSPITAL | Age: 68
End: 2023-07-31
Payer: MEDICARE

## 2023-07-31 ENCOUNTER — ANESTHESIA EVENT (OUTPATIENT)
Dept: ENDOSCOPY | Facility: HOSPITAL | Age: 68
End: 2023-07-31
Payer: MEDICARE

## 2023-07-31 ENCOUNTER — HOSPITAL ENCOUNTER (OUTPATIENT)
Facility: HOSPITAL | Age: 68
Discharge: HOME | End: 2023-07-31
Attending: COLON & RECTAL SURGERY | Admitting: COLON & RECTAL SURGERY
Payer: MEDICARE

## 2023-07-31 VITALS
DIASTOLIC BLOOD PRESSURE: 70 MMHG | WEIGHT: 171.06 LBS | SYSTOLIC BLOOD PRESSURE: 133 MMHG | BODY MASS INDEX: 24.49 KG/M2 | HEART RATE: 50 BPM | HEIGHT: 70 IN | RESPIRATION RATE: 19 BRPM | TEMPERATURE: 98 F | OXYGEN SATURATION: 99 %

## 2023-07-31 DIAGNOSIS — Z12.11 ENCOUNTER FOR SCREENING COLONOSCOPY: ICD-10-CM

## 2023-07-31 PROCEDURE — 25000003 PHARM REV CODE 250: Performed by: NURSE ANESTHETIST, CERTIFIED REGISTERED

## 2023-07-31 PROCEDURE — 25000003 PHARM REV CODE 250: Performed by: COLON & RECTAL SURGERY

## 2023-07-31 PROCEDURE — E9220 PRA ENDO ANESTHESIA: ICD-10-PCS | Mod: PT,,, | Performed by: NURSE ANESTHETIST, CERTIFIED REGISTERED

## 2023-07-31 PROCEDURE — 88305 TISSUE EXAM BY PATHOLOGIST: CPT | Performed by: PATHOLOGY

## 2023-07-31 PROCEDURE — 45385 PR COLONOSCOPY,REMV LESN,SNARE: ICD-10-PCS | Mod: PT,,, | Performed by: COLON & RECTAL SURGERY

## 2023-07-31 PROCEDURE — 88305 TISSUE EXAM BY PATHOLOGIST: CPT | Mod: 26,,, | Performed by: PATHOLOGY

## 2023-07-31 PROCEDURE — 63600175 PHARM REV CODE 636 W HCPCS: Performed by: NURSE ANESTHETIST, CERTIFIED REGISTERED

## 2023-07-31 PROCEDURE — E9220 PRA ENDO ANESTHESIA: HCPCS | Mod: PT,,, | Performed by: NURSE ANESTHETIST, CERTIFIED REGISTERED

## 2023-07-31 PROCEDURE — 37000009 HC ANESTHESIA EA ADD 15 MINS: Performed by: COLON & RECTAL SURGERY

## 2023-07-31 PROCEDURE — 27201089 HC SNARE, DISP (ANY): Performed by: COLON & RECTAL SURGERY

## 2023-07-31 PROCEDURE — 88305 TISSUE EXAM BY PATHOLOGIST: ICD-10-PCS | Mod: 26,,, | Performed by: PATHOLOGY

## 2023-07-31 PROCEDURE — 37000008 HC ANESTHESIA 1ST 15 MINUTES: Performed by: COLON & RECTAL SURGERY

## 2023-07-31 PROCEDURE — 45385 COLONOSCOPY W/LESION REMOVAL: CPT | Mod: PT,,, | Performed by: COLON & RECTAL SURGERY

## 2023-07-31 PROCEDURE — 45385 COLONOSCOPY W/LESION REMOVAL: CPT | Mod: PT | Performed by: COLON & RECTAL SURGERY

## 2023-07-31 RX ORDER — SODIUM CHLORIDE 9 MG/ML
INJECTION, SOLUTION INTRAVENOUS CONTINUOUS
Status: DISCONTINUED | OUTPATIENT
Start: 2023-07-31 | End: 2023-07-31 | Stop reason: HOSPADM

## 2023-07-31 RX ORDER — LIDOCAINE HYDROCHLORIDE 20 MG/ML
INJECTION INTRAVENOUS
Status: DISCONTINUED | OUTPATIENT
Start: 2023-07-31 | End: 2023-07-31

## 2023-07-31 RX ORDER — SODIUM CHLORIDE 0.9 % (FLUSH) 0.9 %
3 SYRINGE (ML) INJECTION EVERY 4 HOURS PRN
Status: CANCELLED | OUTPATIENT
Start: 2023-07-31

## 2023-07-31 RX ORDER — PROPOFOL 10 MG/ML
VIAL (ML) INTRAVENOUS
Status: DISCONTINUED | OUTPATIENT
Start: 2023-07-31 | End: 2023-07-31

## 2023-07-31 RX ADMIN — SODIUM CHLORIDE: 0.9 INJECTION, SOLUTION INTRAVENOUS at 09:07

## 2023-07-31 RX ADMIN — LIDOCAINE HYDROCHLORIDE 50 MG: 20 INJECTION INTRAVENOUS at 10:07

## 2023-07-31 RX ADMIN — PROPOFOL 175 MCG/KG/MIN: 10 INJECTION, EMULSION INTRAVENOUS at 10:07

## 2023-07-31 NOTE — ANESTHESIA POSTPROCEDURE EVALUATION
Anesthesia Post Evaluation    Patient: Arben De Luna    Procedure(s) Performed: Procedure(s) (LRB):  COLONOSCOPY (N/A)    Final Anesthesia Type: general      Patient location during evaluation: PACU  Patient participation: Yes- Able to Participate  Level of consciousness: awake and alert  Post-procedure vital signs: reviewed and stable  Pain management: adequate  Airway patency: patent    PONV status at discharge: No PONV  Anesthetic complications: no      Cardiovascular status: blood pressure returned to baseline  Respiratory status: unassisted  Hydration status: euvolemic  Follow-up not needed.          Vitals Value Taken Time   /70 07/31/23 1114   Temp 36.7 °C (98 °F) 07/31/23 1039   Pulse 50 07/31/23 1114   Resp 19 07/31/23 1114   SpO2 99 % 07/31/23 1114         Event Time   Out of Recovery 11:15:08         Pain/Chris Score: Chris Score: 10 (7/31/2023 10:39 AM)

## 2023-07-31 NOTE — ANESTHESIA PREPROCEDURE EVALUATION
Pre-operative evaluation for Procedure(s) (LRB):  COLONOSCOPY (N/A)    Arben De Luna is a 68 y.o. female with pmh of MDD who presents with diarrhea. Plan for the above procedure.     Patient Active Problem List   Diagnosis    Recurrent major depressive disorder, in full remission    Osteopenia with high risk of fracture    Sensorineural hearing loss (SNHL) of both ears    Tinnitus of both ears    Allergic rhinitis    Nasal septal deviation    Cough    Ground glass opacity present on imaging of lung    Bronchiectasis    Hearing loss    Diarrhea        No current facility-administered medications on file prior to encounter.     Current Outpatient Medications on File Prior to Encounter   Medication Sig Dispense Refill    sodium,potassium,mag sulfates (SUPREP BOWEL PREP KIT) 17.5-3.13-1.6 gram SolR Follow instructions given by Endoscopy scheduling nurse 1 kit 0    venlafaxine (EFFEXOR-XR) 37.5 MG 24 hr capsule Take 1 capsule (37.5 mg total) by mouth once daily. 90 capsule 0    albuterol (VENTOLIN HFA) 90 mcg/actuation inhaler Inhale 2 puffs into the lungs every 6 (six) hours as needed for Wheezing. Rescue 16 g 1    calcium carbonate (OS-SULEIMAN) 600 mg calcium (1,500 mg) Tab Take 600 mg by mouth 2 (two) times daily with meals.         Past Surgical History:   Procedure Laterality Date    CATARACT EXTRACTION Right            Pre-op Assessment    I have reviewed the Patient Summary Reports.     I have reviewed the Nursing Notes.    I have reviewed the Medications.     Review of Systems  Anesthesia Hx:  No problems with previous Anesthesia  History of prior surgery of interest to airway management or planning: Denies Family Hx of Anesthesia complications.   Denies Personal Hx of Anesthesia complications.   Hematology/Oncology:  Hematology Normal   Oncology Normal     EENT/Dental:EENT/Dental Normal   Cardiovascular:  Cardiovascular Normal Exercise tolerance: good     Pulmonary:  Pulmonary Normal     Renal/:  Renal/ Normal     Hepatic/GI:   Bowel Prep.    Neurological:  Neurology Normal    Psych:   depression          Physical Exam  General: Well nourished, Cooperative, Alert and Oriented    Airway:  Mouth Opening: Normal  TM Distance: Normal  Tongue: Normal  Neck ROM: Normal ROM    Chest/Lungs:  Clear to auscultation, Normal Respiratory Rate    Heart:  Rate: Normal  Rhythm: Regular Rhythm  Sounds: Normal        Anesthesia Plan  Type of Anesthesia, risks & benefits discussed:    Anesthesia Type: MAC, Gen Natural Airway, Gen ETT  Intra-op Monitoring Plan: Standard ASA Monitors  Post Op Pain Control Plan: multimodal analgesia  Induction:  IV  Informed Consent: Informed consent signed with the Patient and all parties understand the risks and agree with anesthesia plan.  All questions answered.   ASA Score: 2  Day of Surgery Review of History & Physical: H&P Update referred to the surgeon/provider.    Ready For Surgery From Anesthesia Perspective.     .

## 2023-07-31 NOTE — TRANSFER OF CARE
"Anesthesia Transfer of Care Note    Patient: Arben De Luna    Procedure(s) Performed: Procedure(s) (LRB):  COLONOSCOPY (N/A)    Patient location: PACU    Anesthesia Type: general    Transport from OR: Transported from OR on room air with adequate spontaneous ventilation    Post pain: adequate analgesia    Post assessment: no apparent anesthetic complications    Post vital signs: stable    Level of consciousness: responds to stimulation    Nausea/Vomiting: no nausea/vomiting    Complications: none    Transfer of care protocol was followed      Last vitals:   Visit Vitals  /70   Pulse 60   Temp 36.7 °C (98 °F)   Resp 16   Ht 5' 10" (1.778 m)   Wt 77.6 kg (171 lb 1.2 oz)   SpO2 99%   BMI 24.55 kg/m²     "

## 2023-07-31 NOTE — [employer]ATION PATIENT INSTRUCTIONS
Discharge Summary/Instructions after an Endoscopic Procedure  Patient Name: Arben De Luna  Patient MRN: [id number]  Patient YOB: 1955 Monday, July 31, 2023  Sterling Donovan MD  Dear patient,  As a result of recent federal legislation (The Federal Cures Act), you may   receive lab or pathology results from your procedure in your [facility]   account before your physician is able to contact you. Your physician or   their representative will relay the results to you with their   recommendations at their soonest availability.  Thank you,  RESTRICTIONS:  During your procedure today, you received medications for sedation.  These   medications may affect your judgment, balance and coordination.  Therefore,   for 24 hours, you have the following restrictions:   - DO NOT drive a car, operate machinery, make legal/financial decisions,   sign important papers or drink alcohol.    ACTIVITY:  Today: no heavy lifting, straining or running due to procedural   sedation/anesthesia.  The following day: return to full activity including work.  DIET:  Eat and drink normally unless instructed otherwise.     TREATMENT FOR COMMON SIDE EFFECTS:  - Mild abdominal pain, nausea, belching, bloating or excessive gas:  rest,   eat lightly and use a heating pad.  - Sore Throat: treat with throat lozenges and/or gargle with warm salt   water.  - Because air was used during the procedure, expelling large amounts of air   from your rectum or belching is normal.  - If a bowel prep was taken, you may not have a bowel movement for 1-3 days.    This is normal.  SYMPTOMS TO WATCH FOR AND REPORT TO YOUR PHYSICIAN:  1. Abdominal pain or bloating, other than gas cramps.  2. Chest pain.  3. Back pain.  4. Signs of infection such as: chills or fever occurring within 24 hours   after the procedure.  5. Rectal bleeding, which would show as bright red, maroon, or black stools.   (A tablespoon of blood from the rectum is not serious, especially if    hemorrhoids are present.)  6. Vomiting.  7. Weakness or dizziness.  GO DIRECTLY TO THE NEAREST EMERGENCY ROOM IF YOU HAVE ANY OF THE FOLLOWING:      Difficulty breathing              Chills and/or fever over 101 F   Persistent vomiting and/or vomiting blood   Severe abdominal pain   Severe chest pain   Black, tarry stools   Bleeding- more than one tablespoon   Any other symptom or condition that you feel may need urgent attention  Your doctor recommends these additional instructions:  If any biopsies were taken, your doctors clinic will contact you in 1 to 2   weeks with any results.  - Discharge patient to home (ambulatory).   - Patient has a contact number available for emergencies.  The signs and   symptoms of potential delayed complications were discussed with the   patient.  Return to normal activities tomorrow.  Written discharge   instructions were provided to the patient.   - Resume previous diet.   - Continue present medications.   - Return to primary care physician as previously scheduled.   - Repeat colonoscopy date to be determined after pending pathology results   are reviewed for surveillance based on pathology results.  For questions, problems or results please call your physician - Sterling Donovan MD at Work:  [phone].  [facility], EMERGENCY ROOM PHONE NUMBER: [phone]  IF A COMPLICATION OR EMERGENCY SITUATION ARISES AND YOU ARE UNABLE TO REACH   YOUR PHYSICIAN - GO DIRECTLY TO THE EMERGENCY ROOM.  Sterling Donovan MD  7/31/2023 10:35:47 AM  This report has been verified and signed electronically.  Dear patient,  As a result of recent federal legislation (The Federal Cures Act), you may   receive lab or pathology results from your procedure in your [facility]   account before your physician is able to contact you. Your physician or   their representative will relay the results to you with their   recommendations at their soonest availability.  Thank you,  [employer]ATION

## 2023-07-31 NOTE — OR NURSING
Patient not clear on arrival. Last prep @ 0515 and water @0535. Not clear yet. Delay until 0915 per anesthesia and Dr Donovan for NPO precaurions. Patient agrees with plan. To slot U. After further  chart review, patient did not receive updated prep instructions after rescheduling from May. Followed instructions on prep packaging which states 2 hrs NPO for clear liquids.

## 2023-07-31 NOTE — H&P
Colonoscopy History and Physical      Procedure : Colonoscopy    Indications:  asymptomatic screening exam, family history of colon cancer (mom), and personal history of colon polyps. Denies changes in bowel habit, blood with BM, constipation, diarrhea.     Family Hx of CRC: reported hx of cancerous polyps in her mom    Last Colonoscopy:  2018    Past Medical History:   Diagnosis Date    Actinic keratoses     Arthritis     right knee     Cherry angioma     History of colonic polyps - adenomatous polyps     1 tub adenoma on cscope 2018    Plantar fasciitis     Seborrheic keratoses     Squamous cell carcinoma in situ     right arm       Family History   Problem Relation Age of Onset    Heart disease Mother     Stroke Mother     Hyperlipidemia Mother     Hypertension Mother     Cancer Father         throat CA, + tobacco    Depression Sister         bipolar depression    ALS Brother     No Known Problems Daughter     No Known Problems Son     Cancer Sister         ? uterine    No Known Problems Sister     No Known Problems Sister     No Known Problems Sister     Other Sister         colitis    Cancer Brother         prostate CA     No Known Problems Brother     No Known Problems Brother     No Known Problems Brother     Melanoma Neg Hx        Social History     Socioeconomic History    Marital status:      Spouse name: shaq mata    Number of children: 2   Occupational History    Occupation: part time -    Tobacco Use    Smoking status: Former     Current packs/day: 0.25     Average packs/day: 0.3 packs/day for 4.0 years (1.0 ttl pk-yrs)     Types: Cigarettes    Smokeless tobacco: Never    Tobacco comments:     tob: in high school and quit early college   Substance and Sexual Activity    Alcohol use: Yes     Comment: 1-2 glasses of wine 5 times pe week    Drug use: Yes     Types: Marijuana    Sexual activity: Not Currently     Partners: Male   Social History Narrative    From [place]      Moved to [place] summer 2018 - daughter and granddaughter live in [place]     Exercising - intermittently        Review of patient's allergies indicates:  No Known Allergies    No current facility-administered medications on file prior to encounter.     Current Outpatient Medications on File Prior to Encounter   Medication Sig Dispense Refill    sodium,potassium,mag sulfates (SUPREP BOWEL PREP KIT) 17.5-3.13-1.6 gram SolR Follow instructions given by Endoscopy scheduling nurse 1 kit 0    venlafaxine (EFFEXOR-XR) 37.5 MG 24 hr capsule Take 1 capsule (37.5 mg total) by mouth once daily. 90 capsule 0    albuterol (VENTOLIN HFA) 90 mcg/actuation inhaler Inhale 2 puffs into the lungs every 6 (six) hours as needed for Wheezing. Rescue 16 g 1    calcium carbonate (OS-SULEIMAN) 600 mg calcium (1,500 mg) Tab Take 600 mg by mouth 2 (two) times daily with meals.         Review of Systems -   Respiratory ROS: negative  Cardiovascular ROS: negative  Gastrointestinal ROS: negative  Musculoskeletal ROS: negative  Neurological ROS: negative        Physical Exam:  General: no distress  Head: normocephalic  Lungs:  normal respiratory effort  Heart: regular rate  Abdomen: soft,  Non-tender  Extremities: warm and well perfused       Deep Sedation: Mallampati Score per anesthesia    ASA: II      Patient cleared for Anesthesia:  MAC    Anesthesia/Surgery risks, benefits, and alternative options discussed and understood by patient/family.

## 2023-08-02 ENCOUNTER — OFFICE VISIT (OUTPATIENT)
Dept: OTOLARYNGOLOGY | Facility: CLINIC | Age: 68
End: 2023-08-02
Payer: MEDICARE

## 2023-08-02 VITALS
DIASTOLIC BLOOD PRESSURE: 69 MMHG | WEIGHT: 169.06 LBS | BODY MASS INDEX: 24.2 KG/M2 | SYSTOLIC BLOOD PRESSURE: 102 MMHG | HEART RATE: 64 BPM | HEIGHT: 70 IN

## 2023-08-02 DIAGNOSIS — R22.0 SWOLLEN CHEEK: Primary | ICD-10-CM

## 2023-08-02 PROCEDURE — 31231 NASAL ENDOSCOPY DX: CPT | Mod: PBBFAC | Performed by: OTOLARYNGOLOGY

## 2023-08-02 PROCEDURE — 99999 PR PBB SHADOW E&M-EST. PATIENT-LVL III: ICD-10-PCS | Mod: PBBFAC,,, | Performed by: OTOLARYNGOLOGY

## 2023-08-02 PROCEDURE — 99214 OFFICE O/P EST MOD 30 MIN: CPT | Mod: 25,S$PBB,, | Performed by: OTOLARYNGOLOGY

## 2023-08-02 PROCEDURE — 99214 PR OFFICE/OUTPT VISIT, EST, LEVL IV, 30-39 MIN: ICD-10-PCS | Mod: 25,S$PBB,, | Performed by: OTOLARYNGOLOGY

## 2023-08-02 PROCEDURE — 99213 OFFICE O/P EST LOW 20 MIN: CPT | Mod: PBBFAC | Performed by: OTOLARYNGOLOGY

## 2023-08-02 PROCEDURE — 31231 PR NASAL ENDOSCOPY, DX: ICD-10-PCS | Mod: S$PBB,,, | Performed by: OTOLARYNGOLOGY

## 2023-08-02 PROCEDURE — 31231 NASAL ENDOSCOPY DX: CPT | Mod: S$PBB,,, | Performed by: OTOLARYNGOLOGY

## 2023-08-02 PROCEDURE — 99999 PR PBB SHADOW E&M-EST. PATIENT-LVL III: CPT | Mod: PBBFAC,,, | Performed by: OTOLARYNGOLOGY

## 2023-08-02 NOTE — PROGRESS NOTES
History of Present Illness:   Arben De Luna is a 68 y.o. year old female evaluated on 8/9/2023, in the Otolaryngology-Head and Neck Surgery Clinic at [facility]. The patient was referred by Dr. To for evaluation of concern for cheek swelling.  Patient reports that this problem has going on for about a year with left cheek swelling that somewhat fluctuates.  She reports having crown on the maxillary teeth on the left around the time of onset.  She reports left cheek occasionally gets hot and red with no fevers or thick mucus coming out.  She denies any preceding URI were 2 sinus infections since then.  She denies any trauma to the area.  She is not had any issues with saliva production or foul taste in mouth.  She does not associate this with eating.              Past Medical/Surgical History  Past Medical History:   Diagnosis Date    Actinic keratoses     Arthritis     right knee     Cherry angioma     History of colonic polyps - adenomatous polyps     1 tub adenoma on cscope 2018    Plantar fasciitis     Seborrheic keratoses     Squamous cell carcinoma in situ     right arm     Her  has a past surgical history that includes Cataract extraction (Right) and Colonoscopy (N/A, 7/31/2023).     Past Family/Social History  Her family history includes ALS in her brother; Cancer in her brother, father, and sister; Depression in her sister; Heart disease in her mother; Hyperlipidemia in her mother; Hypertension in her mother; No Known Problems in her brother, brother, brother, daughter, sister, sister, sister, and son; Other in her sister; Stroke in her mother.  She  reports that she has quit smoking. Her smoking use included cigarettes. She has a 1.0 pack-year smoking history. She has never used smokeless tobacco. She reports current alcohol use. She reports current drug use. Drug: Marijuana.     Medications/Allergies/Immunizations  Her current medication(s) include:   Current Outpatient Medications    Medication Sig Dispense Refill    calcium carbonate (OS-SULEIMAN) 600 mg calcium (1,500 mg) Tab Take 600 mg by mouth 2 (two) times daily with meals.      mupirocin (BACTROBAN) 2 % ointment Apply topically 3 (three) times daily. 2 g 0    sodium,potassium,mag sulfates (SUPREP BOWEL PREP KIT) 17.5-3.13-1.6 gram SolR Follow instructions given by Endoscopy scheduling nurse 1 kit 0    venlafaxine (EFFEXOR-XR) 37.5 MG 24 hr capsule Take 1 capsule (37.5 mg total) by mouth once daily. 90 capsule 0    albuterol (VENTOLIN HFA) 90 mcg/actuation inhaler Inhale 2 puffs into the lungs every 6 (six) hours as needed for Wheezing. Rescue (Patient not taking: Reported on 8/2/2023) 16 g 1     No current facility-administered medications for this visit.        Allergies: Patient has no known allergies.     Immunizations:   Immunization History   Administered Date(s) Administered    COVID-19 MRNA, LN-S PF (MODERNA HALF 0.25 ML DOSE) 06/03/2022    COVID-19, MRNA, LN-S, PF (MODERNA FULL 0.5 ML DOSE) 02/23/2021, 03/23/2021, 10/29/2021    Influenza 01/02/2019    Influenza (FLUAD) - Quadrivalent - Adjuvanted - PF *Preferred* (65+) 10/16/2020, 10/17/2021    Influenza - Quadrivalent - MDCK - PF 10/26/2019    Pneumococcal Conjugate - 13 Valent 12/15/2021    Pneumococcal Polysaccharide - 23 Valent 06/18/2018    Zoster 06/18/2018    Zoster Recombinant 02/23/2022, 06/03/2022         Review of Systems   Constitutional: Negative for fever, weight loss and weight gain.  Skin: Negative for rash, itchiness, dryness  HENT:  As per HPI  Cardiovascular: Negative for chest pain and dyspnea on exertion .   Respiratory: Is not experiencing shortness of breath.   Gastrointestinal: Negative for nausea and vomiting.   Neurological: Negative for headaches.   Lymph/Heme: Negative for lymphadenopathy or easy bruising  Musculoskeletal: Negative for joint or muscle pain  Psychiatric: The patient is not nervous/anxious.        All other systems are negative except for  "that listed in the HPI.      PHYSICAL EXAM:   Vital Signs:  /69 (BP Location: Left arm, Patient Position: Sitting, BP Method: Large (Automatic))   Pulse 64   Ht 5' 10" (1.778 m)   Wt 76.7 kg (169 lb 1.5 oz)   BMI 24.26 kg/m²      General:  Well-developed, well-nourished  Communication and Voice:  Clear pitch and clarity  Hearing: Hearing adequate for verbal communication bilaterally   Inspection:  Normocephalic and atraumatic without mass or lesion  Palpation:  Facial skeleton intact without bony stepoffs  Parotid Glands:  No mass or tenderness  Facial Strength:  Facial motility symmetric and full bilaterally  Pinna:  External ear intact and fully developed  External canal:  Canal is patent with intact skin  Tympanic Membrane:  Clear and mobile  External nose:  No scar or anatomic deformity  Internal Nose:  Septum intact and midline.  No edema, polyp, or rhinorrhea.  TMJ:  No pain to palpation with full mobility  Oral cavity, Lips, Teeth, and Gums:  Mucosa and teeth intact and viable, No lesions, masses or ulcers normal flow from Stensen's duct bilaterally  Oropharynx: No erythema or exudate, no masses or ulcerations, non-obstructive tonsils  Nasopharynx:  No mass or lesion with intact mucosa  Hypopharynx:  Not well visualized secondary to gagging  Larynx:  Not well visualized secondary to gagging  Neck, Trachea, Lymphatics:  Midline trachea without mass or lesion, no lymphadenopathy  Thyroid:  No mass or nodularity  Eyes: No nystagmus with equal extraocular motion bilaterally  Neuro/Psych/Balance: Patient oriented and appropriate in interaction;  Appropriate mood and affect;  Gait is intact with no imbalance; Cranial nerves I-XII are intact  Respiratory effort:  Equal inspiration and expiration without stridor  Peripheral Vascular:  Warm extremities with equal pulses       Procedure: Rigid endoscopic nasal exam   Indications:  Cheek swelling  Surgeon: Santos Garcia MD  Procedure note/findings: After " informed discussion of the risks, benefits, and alternatives after indications as noted above, nasal cavities were topically anesthetized and decongested with 4% lidocaine and Afrin solutions. A rigid 0 degree nasal endoscope was inserted into bilateral nasal cavities and the following was noted    Right:   Turbinates normal without hypertrophy  Osteomeatal complex without polyps or lesions      Left   Turbinates normal without hypertrophy  Osteomeatal complex without polyps or lesions    Nasopharynx, similarly, revealed no mass lesion or granularity. There was no ulceration. There was no gross asymmetry. Fossa of Rosenmueller was intact bilaterally. The eustachian tube orifices were intact bilaterally and patent.  The scope was then withdrawn and removed. She tolerated this well.               ASSESSMENT:   1. Swollen cheek            PLAN:   I reassured the patient that I see no mass or lesion with normal salivary flow as well as normal nasal endoscopic exam.  This is likely benign fluctuating lymphedema.  No need to further workup with any imaging or other workup.  Follow-up with me as needed    I believe that Ms. De Luna has a good understanding of the issues involved and I answered all of her questions.     DISCLAIMER: This note was prepared with [employer] voice recognition transcription software. Garbled syntax, mangled pronouns, and other bizarre constructions may be attributed to that software system. While efforts were made to correct any mistakes made by this voice recognition program, some errors and/or omissions may remain in the note that were missed when the note was originally created.

## 2023-08-04 LAB
FINAL PATHOLOGIC DIAGNOSIS: NORMAL
GROSS: NORMAL
Lab: NORMAL

## 2023-08-09 ENCOUNTER — PES CALL (OUTPATIENT)
Dept: ADMINISTRATIVE | Facility: CLINIC | Age: 68
End: 2023-08-09
Payer: MEDICARE

## 2023-08-29 ENCOUNTER — PATIENT MESSAGE (OUTPATIENT)
Dept: DERMATOLOGY | Facility: CLINIC | Age: 68
End: 2023-08-29
Payer: MEDICARE

## 2023-12-15 ENCOUNTER — TELEPHONE (OUTPATIENT)
Dept: ADMINISTRATIVE | Facility: CLINIC | Age: 68
End: 2023-12-15
Payer: MEDICARE

## 2023-12-18 ENCOUNTER — OFFICE VISIT (OUTPATIENT)
Dept: INTERNAL MEDICINE | Facility: CLINIC | Age: 68
End: 2023-12-18
Payer: MEDICARE

## 2023-12-18 VITALS
SYSTOLIC BLOOD PRESSURE: 108 MMHG | BODY MASS INDEX: 24.84 KG/M2 | HEART RATE: 67 BPM | OXYGEN SATURATION: 98 % | DIASTOLIC BLOOD PRESSURE: 68 MMHG | WEIGHT: 173.5 LBS | HEIGHT: 70 IN

## 2023-12-18 DIAGNOSIS — R05.9 COUGH, UNSPECIFIED TYPE: ICD-10-CM

## 2023-12-18 DIAGNOSIS — J34.2 NASAL SEPTAL DEVIATION: ICD-10-CM

## 2023-12-18 DIAGNOSIS — H90.3 SENSORINEURAL HEARING LOSS (SNHL) OF BOTH EARS: ICD-10-CM

## 2023-12-18 DIAGNOSIS — F33.42 RECURRENT MAJOR DEPRESSIVE DISORDER, IN FULL REMISSION: ICD-10-CM

## 2023-12-18 DIAGNOSIS — R91.8 GROUND GLASS OPACITY PRESENT ON IMAGING OF LUNG: ICD-10-CM

## 2023-12-18 DIAGNOSIS — H91.93 BILATERAL HEARING LOSS, UNSPECIFIED HEARING LOSS TYPE: ICD-10-CM

## 2023-12-18 DIAGNOSIS — R19.7 DIARRHEA, UNSPECIFIED TYPE: ICD-10-CM

## 2023-12-18 DIAGNOSIS — Z00.00 ENCOUNTER FOR PREVENTIVE HEALTH EXAMINATION: Primary | ICD-10-CM

## 2023-12-18 DIAGNOSIS — M85.80 OSTEOPENIA WITH HIGH RISK OF FRACTURE: ICD-10-CM

## 2023-12-18 DIAGNOSIS — H93.13 TINNITUS OF BOTH EARS: ICD-10-CM

## 2023-12-18 DIAGNOSIS — J47.9 BRONCHIECTASIS WITHOUT COMPLICATION: ICD-10-CM

## 2023-12-18 DIAGNOSIS — J30.9 ALLERGIC RHINITIS, UNSPECIFIED SEASONALITY, UNSPECIFIED TRIGGER: ICD-10-CM

## 2023-12-18 PROCEDURE — G0439 PR MEDICARE ANNUAL WELLNESS SUBSEQUENT VISIT: ICD-10-PCS | Mod: ,,, | Performed by: NURSE PRACTITIONER

## 2023-12-18 PROCEDURE — 99999 PR PBB SHADOW E&M-EST. PATIENT-LVL III: CPT | Mod: PBBFAC,,, | Performed by: NURSE PRACTITIONER

## 2023-12-18 PROCEDURE — 99999 PR PBB SHADOW E&M-EST. PATIENT-LVL III: ICD-10-PCS | Mod: PBBFAC,,, | Performed by: NURSE PRACTITIONER

## 2023-12-18 PROCEDURE — G0439 PPPS, SUBSEQ VISIT: HCPCS | Mod: ,,, | Performed by: NURSE PRACTITIONER

## 2023-12-18 PROCEDURE — 99213 OFFICE O/P EST LOW 20 MIN: CPT | Mod: PBBFAC | Performed by: NURSE PRACTITIONER

## 2023-12-18 NOTE — PROGRESS NOTES
"  Arben De Luna presented for a  Medicare AWV and comprehensive Health Risk Assessment today. The following components were reviewed and updated:    Medical history  Family History  Social history  Allergies and Current Medications  Health Risk Assessment  Health Maintenance  Care Team         ** See Completed Assessments for Annual Wellness Visit within the encounter summary.**         The following assessments were completed:  Living Situation  CAGE  Depression Screening  Timed Get Up and Go  Whisper Test  Cognitive Function Screening  Nutrition Screening  ADL Screening  PAQ Screening        Vitals:    12/18/23 1048   BP: 108/68   BP Location: Right arm   Patient Position: Sitting   Pulse: 67   SpO2: 98%   Weight: 78.7 kg (173 lb 8 oz)   Height: 5' 10" (1.778 m)     Body mass index is 24.89 kg/m².    Physical Exam  Vitals reviewed.   Constitutional:       Appearance: She is well-developed.   HENT:      Head: Normocephalic and atraumatic. Not macrocephalic and not microcephalic. No raccoon eyes, Haines's sign, abrasion, contusion, right periorbital erythema, left periorbital erythema or laceration. Hair is normal.      Right Ear: No decreased hearing noted. No laceration, drainage, swelling or tenderness. No middle ear effusion. No foreign body. No mastoid tenderness. No hemotympanum. Tympanic membrane is not injected, scarred, perforated, erythematous, retracted or bulging. Tympanic membrane has normal mobility.      Left Ear: No decreased hearing noted. No laceration, drainage, swelling or tenderness.  No middle ear effusion. No foreign body. No mastoid tenderness. No hemotympanum. Tympanic membrane is not injected, scarred, perforated, erythematous, retracted or bulging. Tympanic membrane has normal mobility.      Nose: Nose normal. No nasal deformity, laceration or mucosal edema.      Mouth/Throat:      Pharynx: Uvula midline.   Eyes:      General: Lids are normal.      Conjunctiva/sclera: Conjunctivae normal. "   Neck:      Thyroid: No thyroid mass or thyromegaly.      Trachea: Trachea normal.   Cardiovascular:      Rate and Rhythm: Normal rate and regular rhythm.   Pulmonary:      Effort: Pulmonary effort is normal.      Breath sounds: Normal breath sounds.   Abdominal:      Palpations: Abdomen is soft.   Musculoskeletal:         General: Normal range of motion.      Cervical back: Neck supple. No edema or erythema. No spinous process tenderness or muscular tenderness. Normal range of motion.   Lymphadenopathy:      Head:      Right side of head: No submental, submandibular, tonsillar, preauricular or posterior auricular adenopathy.      Left side of head: No submental, submandibular, tonsillar, preauricular, posterior auricular or occipital adenopathy.   Skin:     General: Skin is warm and dry.   Neurological:      Mental Status: She is alert and oriented to person, place, and time.      Cranial Nerves: No cranial nerve deficit.      Sensory: No sensory deficit.   Psychiatric:         Behavior: Behavior normal.         Thought Content: Thought content normal.         Judgment: Judgment normal.               Diagnoses and health risks identified today and associated recommendations/orders:    1. Encounter for preventive health examination  Annual Health Risk Assessment (HRA) visit today.  Counseling and referral of health maintenance and preventative health measures performed.  Patient given annual wellness paperwork to take home.  Encouraged to return in 1 year for subsequent HRA visit.     2. Recurrent major depressive disorder, in full remission  Chronic. Stable. Continue current treatment plan as previously prescribed by PCP.    3. Sensorineural hearing loss (SNHL) of both ears  Chronic. Stable. Continue current treatment plan as previously prescribed by PCP.    4. Nasal septal deviation  Chronic. Stable. Continue current treatment plan as previously prescribed by PCP.    5. Bilateral hearing loss, unspecified hearing  loss type  Chronic. Stable. Continue current treatment plan as previously prescribed by PCP.    6. Allergic rhinitis, unspecified seasonality, unspecified trigger  Chronic. Stable. Continue current treatment plan as previously prescribed by PCP.    7. Tinnitus of both ears  Chronic. Stable. Continue current treatment plan as previously prescribed by PCP.    8. Bronchiectasis without complication  Chronic. Stable. Continue current treatment plan as previously prescribed by PCP.    9. Cough, unspecified type  Chronic. Stable. Continue current treatment plan as previously prescribed by PCP.    10. Ground glass opacity present on imaging of lung  Chronic. Stable. Continue current treatment plan as previously prescribed by PCP.    11. Diarrhea, unspecified type  Chronic. Stable. Continue current treatment plan as previously prescribed by PCP.    12. Osteopenia with high risk of fracture  Chronic. Stable. Continue current treatment plan as previously prescribed by PCP.      Provided Arben with a 5-10 year written screening schedule and personal prevention plan. Recommendations were developed using the USPSTF age appropriate recommendations. Education, counseling, and referrals were provided as needed. After Visit Summary printed and given to patient which includes a list of additional screenings\tests needed.      I offered to discuss end of life issues, including information on how to make advance directives that the patient could use to name someone who would make medical decisions on their behalf if they became too ill to make themselves.    ___Patient declined  _X_Patient is interested, I provided paper work and offered to discuss.    No follow-ups on file.    JANKI Joseph offered to discuss advanced care planning, including how to pick a person who would make decisions for you if you were unable to make them for yourself, called a health care power of , and what kind of decisions you might make such  as use of life sustaining treatments such as ventilators and tube feeding when faced with a life limiting illness recorded on a living will that they will need to know. (How you want to be cared for as you near the end of your natural life)     X Patient is interested in learning more about how to make advanced directives.  I provided them paperwork and offered to discuss this with them.

## 2023-12-18 NOTE — PATIENT INSTRUCTIONS
Counseling and Referral of Other Preventative  (Italic type indicates deductible and co-insurance are waived)    Patient Name: Arben De Luna  Today's Date: 12/18/2023    Health Maintenance       Date Due Completion Date    Mammogram 04/18/2024 4/18/2023    DEXA Scan 01/27/2026 1/27/2023    Lipid Panel 01/27/2028 1/27/2023    Colorectal Cancer Screening 07/31/2030 7/31/2023    TETANUS VACCINE 05/30/2033 5/30/2023        No orders of the defined types were placed in this encounter.    The following information is provided to all patients.  This information is to help you find resources for any of the problems found today that may be affecting your health:                Living healthy guide: www.[facility].[place].gov      Understanding Diabetes: www.diabetes.org      Eating healthy: www.cdc.gov/healthyweight      [facility] home safety checklist: www.cdc.gov/steadi/patient.html      Agency on Aging: www.goea.[place].gov      Alcoholics anonymous (AA): www.aa.org      Physical Activity: www.kirsten.nih.gov/xu4dzvh      Tobacco use: www.quitwithusla.org

## 2024-02-27 ENCOUNTER — OFFICE VISIT (OUTPATIENT)
Dept: DERMATOLOGY | Facility: CLINIC | Age: 69
End: 2024-02-27
Payer: MEDICARE

## 2024-02-27 DIAGNOSIS — L57.0 ACTINIC KERATOSIS: Primary | ICD-10-CM

## 2024-02-27 DIAGNOSIS — L81.4 LENTIGINES: ICD-10-CM

## 2024-02-27 DIAGNOSIS — Z12.83 SKIN CANCER SCREENING: ICD-10-CM

## 2024-02-27 DIAGNOSIS — Z85.828 HISTORY OF NONMELANOMA SKIN CANCER: ICD-10-CM

## 2024-02-27 DIAGNOSIS — L82.0 INFLAMED SEBORRHEIC KERATOSIS: ICD-10-CM

## 2024-02-27 DIAGNOSIS — L82.1 SEBORRHEIC KERATOSIS: ICD-10-CM

## 2024-02-27 PROCEDURE — 17110 DESTRUCTION B9 LES UP TO 14: CPT | Mod: S$GLB,,, | Performed by: DERMATOLOGY

## 2024-02-27 PROCEDURE — 17000 DESTRUCT PREMALG LESION: CPT | Mod: XS,S$GLB,, | Performed by: DERMATOLOGY

## 2024-02-27 PROCEDURE — 17003 DESTRUCT PREMALG LES 2-14: CPT | Mod: XS,S$GLB,, | Performed by: DERMATOLOGY

## 2024-02-27 PROCEDURE — 99213 OFFICE O/P EST LOW 20 MIN: CPT | Mod: 25,S$GLB,, | Performed by: DERMATOLOGY

## 2024-02-27 NOTE — PATIENT INSTRUCTIONS
CRYOSURGERY      Your doctor has used a method called cryosurgery to treat your skin condition. Cryosurgery refers to the use of very cold substances to treat a variety of skin conditions such as warts, precancerous skin lesions, molluscum contagiosum, sun spots, and several benign growths. The substance we use in cryosurgery is liquid nitrogen and is so cold (-195 degrees Celsius) that it burns when administered.     Following treatment in the office, the skin may immediately itch or burn and become red. You may find the area around the lesion is affected as well. It is sometimes necessary to treat not only the lesion, but also a small area of the surrounding normal skin to achieve a good response.     A blister, and even a blood-filled blister, may form after treatment.   This is a normal response. If the blister is painful, it is acceptable to sterilize a needle with rubbing alcohol and gently pop the blister. It is important that you gently wash the area with soap and warm water as the blister fluid may contain wart virus if a wart was treated. Do not remove the roof of the blister.     The area treated can take anywhere from 1-3 weeks to heal. Healing time depends on the kind of skin lesion treated, the location, and how aggressively the lesion was treated. It is recommended that the areas treated are covered with Vaseline and a bandaid to improve healing. If a bandaid is not practical, Vaseline applied several times per day will do. Keeping these areas moist will speed the healing time.    Treatment with liquid nitrogen can leave a scar. In dark skin, it may be a light or dark scar; in light skin it may be a white or pink scar. These will generally fade with time, but may never go away completely.     If you have any concerns after your treatment, please message us via [facility] or call us at [phone].

## 2024-02-27 NOTE — PROGRESS NOTES
"  Patient Information  Name: Arben De Luna  : 1955  MRN: [id number]     Referring Physician:  No ref. provider found   Primary Care Physician:  Joanie To MD   Date of Visit: 2024      Subjective:     History of Present lllness:    Arben De Luna is a 69 y.o. female who presents with a chief complaint of moles.  This is a high risk patient with a personal history of nonmelanoma skin cancer who is here today to check for the development of new lesions.  Patient is here today for a "mole" check. Denies any new, changing, or symptomatic lesions on the skin.    She does have some spots on her back that are very itchy. She scratched them and they bled.    Patient was last seen: 2022.  Prior notes by myself reviewed.   Clinical documentation obtained by nursing staff reviewed.    Review of Systems    Objective:   Physical Exam   Constitutional: She appears well-developed and well-nourished. No distress.   HENT:   Mouth/Throat: Lips normal.    Eyes: Lids are normal.  No conjunctival no injection.   Neurological: She is alert and oriented to person, place, and time. She is not disoriented.   Psychiatric: She has a normal mood and affect.   Skin:   Areas Examined (abnormalities noted in diagram):   Scalp / Hair Palpated and Inspected  Head / Face Inspection Performed  Neck Inspection Performed  Chest / Axilla Inspection Performed  Abdomen Inspection Performed  Genitals / Buttocks / Groin Inspection Performed  Back Inspection Performed  RUE Inspected  LUE Inspection Performed  RLE Inspected  LLE Inspection Performed  Nails and Digits Inspection Performed                 Diagram Legend     Erythematous scaling macule/papule c/w actinic keratosis       Vascular papule c/w angioma      Pigmented verrucoid papule/plaque c/w seborrheic keratosis      Yellow umbilicated papule c/w sebaceous hyperplasia      Irregularly shaped tan macule c/w lentigo     1-2 mm smooth white papules consistent with Milia      " Movable subcutaneous cyst with punctum c/w epidermal inclusion cyst      Subcutaneous movable cyst c/w pilar cyst      Firm pink to brown papule c/w dermatofibroma      Pedunculated fleshy papule(s) c/w skin tag(s)      Evenly pigmented macule c/w junctional nevus     Mildly variegated pigmented, slightly irregular-bordered macule c/w mildly atypical nevus      Flesh colored to evenly pigmented papule c/w intradermal nevus       Pink pearly papule/plaque c/w basal cell carcinoma      Erythematous hyperkeratotic cursted plaque c/w SCC      Surgical scar with no sign of skin cancer recurrence      Open and closed comedones      Inflammatory papules and pustules      Verrucoid papule consistent consistent with wart     Erythematous eczematous patches and plaques     Dystrophic onycholytic nail with subungual debris c/w onychomycosis     Umbilicated papule    Erythematous-base heme-crusted tan verrucoid plaque consistent with inflamed seborrheic keratosis     Erythematous Silvery Scaling Plaque c/w Psoriasis     See annotation    No images are attached to the encounter or orders placed in the encounter.      [] Data reviewed  [] Prior external notes reviewed  [] Independent review of test  [] Management discussed with another provider  [] Independent historian    Assessment / Plan:        Actinic keratosis  Cryosurgery procedure note:  Risk, benefits, and alternatives of cryosurgery are discussed with the patient, including but not limited to the risks of hypopigmentation, hyperpigmentation, scar, infection, recurrence of lesion(s), development of new lesion(s), and need for additional treatment of the lesion(s). Verbal consent obtained from patient. Liquid nitrogen cryosurgery applied to 6 lesion(s) to produce a freeze injury. Counseled patient that blisters may form, and instructed patient on wound care with gentle cleansing and use of Vaseline ointment to keep moist until healed. Handout was provided, and patient was  instructed to return to clinic in 1-2 months if lesions do not completely resolve.    Inflamed seborrheic keratosis  Cryosurgery procedure note:  Risk, benefits, and alternatives of cryosurgery are discussed with the patient, including but not limited to the risks of hypopigmentation, hyperpigmentation, scar, infection, recurrence of lesion(s), development of new lesion(s), and need for additional treatment of the lesion(s). Verbal consent obtained from patient. Liquid nitrogen cryosurgery applied to 6 lesion(s) to produce a freeze injury. Counseled patient that blisters may form, and instructed patient on wound care with gentle cleansing and use of Vaseline ointment to keep moist until healed. Handout was provided, and patient was instructed to return to clinic in 1-2 months if lesions do not completely resolve.    Seborrheic keratosis  These are benign, inherited growths without a malignant potential. Reassurance given to patient. No treatment is necessary.    Lentigines  These are benign sun spots which should be monitored for changes. Daily sun protection will reduce the number of new lesions.   Recommend using a broad-spectrum, water-resistant sunscreen with SPF of 30 or higher--reapply every 2 hours. Seek shade, wear sun-protective clothing, and perform regular skin self-exams.    Skin cancer screening  Total body skin examination performed today as noted in physical exam. No lesions suspicious for malignancy were seen.  Recommend using a broad-spectrum, water-resistant sunscreen with SPF of 30 or higher--reapply every 2 hours. Seek shade, wear sun-protective clothing, and perform regular skin self-exams.    History of nonmelanoma skin cancer   - stable and chronic  Area(s) of previous nonmelanoma skin cancer evaluated with no evidence of recurrence. Reassurance provided.  Recommend using a broad-spectrum, water-resistant sunscreen with SPF of 30 or higher--reapply every 2 hours. Seek shade, wear  sun-protective clothing, and perform regular skin self-exams.      Follow up in about 1 year (around 2/27/2025) for TBSE, or sooner if any new problems or changing lesions.      Heydi Leroy MD, FAAD  [facility]

## 2024-03-09 DIAGNOSIS — F33.42 RECURRENT MAJOR DEPRESSIVE DISORDER, IN FULL REMISSION: ICD-10-CM

## 2024-03-10 DIAGNOSIS — F33.42 RECURRENT MAJOR DEPRESSIVE DISORDER, IN FULL REMISSION: ICD-10-CM

## 2024-03-10 NOTE — TELEPHONE ENCOUNTER
Care Due:                  Date            Visit Type   Department     Provider  --------------------------------------------------------------------------------                                EP -                              PRIMARY      [facility] INTERNAL  Last Visit: 03-      CARE ([facility])   MEDICINE       Joanie To  Next Visit: None Scheduled  None         None Found                                                            Last  Test          Frequency    Reason                     Performed    Due Date  --------------------------------------------------------------------------------    Cr..........  12 months..  venlafaxine..............  03- 03-    [facility] Embedded Care Due Messages. Reference number: [id number].   3/10/2024 5:37:25 PM CDT

## 2024-03-11 RX ORDER — VENLAFAXINE HYDROCHLORIDE 37.5 MG/1
37.5 CAPSULE, EXTENDED RELEASE ORAL
Qty: 90 CAPSULE | Refills: 0 | OUTPATIENT
Start: 2024-03-11

## 2024-03-11 RX ORDER — VENLAFAXINE HYDROCHLORIDE 37.5 MG/1
37.5 CAPSULE, EXTENDED RELEASE ORAL DAILY
Qty: 90 CAPSULE | Refills: 3 | Status: SHIPPED | OUTPATIENT
Start: 2024-03-11 | End: 2025-03-11

## 2024-03-11 NOTE — TELEPHONE ENCOUNTER
Refill Routing Note   Medication(s) are not appropriate for processing by [facility] for the following reason(s):        Required labs outdated    [facility] action(s):  Defer   Requires labs : Yes             Appointments  past 12m or future 3m with PCP    Date Provider   Last Visit   3/21/2023 Joanie To MD   Next Visit   Visit date not found Joanie To MD   ED visits in past 90 days: 0        Note composed:2:16 AM 03/11/2024

## 2024-03-11 NOTE — TELEPHONE ENCOUNTER
Refill Decision Note   Arben De Luna  is requesting a refill authorization.  Brief Assessment and Rationale for Refill:  Quick Discontinue     Medication Therapy Plan:  Duplicate      Comments:     Note composed:2:16 AM 03/11/2024

## 2024-03-25 ENCOUNTER — TELEPHONE (OUTPATIENT)
Dept: PODIATRY | Facility: CLINIC | Age: 69
End: 2024-03-25
Payer: MEDICARE

## 2024-04-29 ENCOUNTER — HOSPITAL ENCOUNTER (OUTPATIENT)
Dept: RADIOLOGY | Facility: OTHER | Age: 69
Discharge: HOME | End: 2024-04-29
Attending: INTERNAL MEDICINE
Payer: MEDICARE

## 2024-04-29 DIAGNOSIS — Z12.31 ENCOUNTER FOR SCREENING MAMMOGRAM FOR BREAST CANCER: ICD-10-CM

## 2024-04-29 PROCEDURE — 77067 SCR MAMMO BI INCL CAD: CPT | Mod: 26,,, | Performed by: RADIOLOGY

## 2024-04-29 PROCEDURE — 77063 BREAST TOMOSYNTHESIS BI: CPT | Mod: TC

## 2024-04-29 PROCEDURE — 77063 BREAST TOMOSYNTHESIS BI: CPT | Mod: 26,,, | Performed by: RADIOLOGY

## 2024-07-22 ENCOUNTER — CLINICAL SUPPORT (OUTPATIENT)
Dept: INTERNAL MEDICINE | Facility: CLINIC | Age: 69
End: 2024-07-22
Payer: MEDICARE

## 2024-07-22 ENCOUNTER — TELEPHONE (OUTPATIENT)
Dept: INTERNAL MEDICINE | Facility: CLINIC | Age: 69
End: 2024-07-22
Payer: MEDICARE

## 2024-07-22 DIAGNOSIS — Z23 IMMUNIZATION DUE: Primary | ICD-10-CM

## 2024-07-22 DIAGNOSIS — Z11.1 SCREENING-PULMONARY TB: Primary | ICD-10-CM

## 2024-07-22 PROCEDURE — 86580 TB INTRADERMAL TEST: CPT | Mod: PBBFAC

## 2024-07-22 PROCEDURE — 99999PBSHW POCT TB SKIN TEST: Mod: PBBFAC,,,

## 2024-07-22 NOTE — TELEPHONE ENCOUNTER
----- Message from Marion Dejesus MA sent at 7/22/2024 12:26 PM CDT -----  Name of Who is Calling:SIDDHARTH LAZO [[id number]]                What is the request in detail: Pt is requesting a call back to see how she goes about getting a TB skin test that she needs for a job by 8/1/24. Please assist.                Can the clinic reply by [facility]: No                What Number to Call Back if not in [facility]: [phone]

## 2024-07-22 NOTE — PROGRESS NOTES
PPD 0.1 ml given intradermally into the left forearm, inner aspect. 0.1 mm bleb noted at injection site, marked with skin marker. Patient instructed not to wash the skin marker off and to return in 48-72 hours for results to be read. Patient advised if PPD is not read within 48-72 hours, repeat is required. Patient verbalized understanding and has no further questions and/or concerns at this time.    Patient instructed to wait in reception area for 15-20 mins to monitor for any adverse response and discharged in stable condition.     Has patient travelled to a foreign country?: NO  Has the patient had any history of positive PPD?: NO     PPD Placement note  Arben Steinernes, 69 y.o. female is here today for placement of PPD test  Reason for PPD test: school  Pt taken PPD test before: yes  Verified in allergy area and with patient that they are not allergic to the products PPD is made of (Phenol or Tween). Yes  Is patient taking any oral or IV steroid medication now or have they taken it in the last month? no  Has the patient ever received the BCG vaccine?: no  Has the patient been in recent contact with anyone known or suspected of having active TB disease?: no  O: Alert and oriented in NAD.  P:  PPD placed on 7/22/2024.  Patient advised to return for reading within 48-72 hours.

## 2024-07-24 ENCOUNTER — CLINICAL SUPPORT (OUTPATIENT)
Dept: INTERNAL MEDICINE | Facility: CLINIC | Age: 69
End: 2024-07-24
Payer: MEDICARE

## 2024-07-24 ENCOUNTER — PATIENT MESSAGE (OUTPATIENT)
Dept: INTERNAL MEDICINE | Facility: CLINIC | Age: 69
End: 2024-07-24

## 2024-07-24 DIAGNOSIS — Z11.1 SCREENING FOR TUBERCULOSIS: Primary | ICD-10-CM

## 2024-07-24 LAB
TB INDURATION - 48 HR READ: 0 MM
TB INDURATION - 48 HR READ: 0 MM
TB INDURATION - 72 HR READ: 0 MM
TB INDURATION - 72 HR READ: NORMAL
TB SKIN TEST - 48 HR READ: NEGATIVE
TB SKIN TEST - 48 HR READ: NEGATIVE
TB SKIN TEST - 72 HR READ: NEGATIVE
TB SKIN TEST - 72 HR READ: NORMAL

## 2024-07-24 NOTE — PROGRESS NOTES
PPD Reading Note  PPD read and results entered in [employer].  Result: 0 mm induration.  Interpretation: NEGATIVE FOR TB.  If test not read within 48-72 hours of initial placement, patient advised to repeat in other arm 1-3 weeks after this test.  Allergic reaction: no

## 2024-11-20 ENCOUNTER — PATIENT MESSAGE (OUTPATIENT)
Dept: INTERNAL MEDICINE | Facility: CLINIC | Age: 69
End: 2024-11-20
Payer: MEDICARE

## 2024-11-20 DIAGNOSIS — F33.42 RECURRENT MAJOR DEPRESSIVE DISORDER, IN FULL REMISSION: Primary | ICD-10-CM

## 2024-11-20 DIAGNOSIS — R54 OLD AGE: ICD-10-CM

## 2024-11-21 NOTE — TELEPHONE ENCOUNTER
Signed referral to [facility]     Below is also a list of psychiatrists and counselors in our area that may be taking new patients:    Call to make an appointment within [facility] for psychiatry/psychology [phone]     Other psychiatrists:   Kadie Almeida(psychiatrist) [address] [facility] [address] Phone: [phone]   Denis Pierce (psychiatrist) [phone], [phone] [address] [facility]   Dr. Davian Angeles - [phone]   Dr. Yancy Nguyen - [phone]     [facility]: [phone]     Therapy/Psychology:   You can try anyone of these number to see if your insurance is accepted or you would have to call your insurance.     [facility] (CBT[facility]   Address: [facility] [place] [address]   Phone: [phone]   [email or web address]     [facility] [address], [address]   Phone: [phone]   You can email for an appointment at: [email or web address]     [facility]   [address] [place] [address] [address], [facility], [address]   [email or web address]   Dr. Prudence Kaplan, [phone] or [email or web address]   Dr. Maria Isabel Monique, [phone] or [email or web address]     Amy REEDW (therapist) [phone]   [address] [place] [address]   Glenys REEDW (therapist) [phone]   [address] [facility]   Sammi REEDW (therapist) [phone]   [address] [place] [address]   VIOLA Pierce         LCSW                    [phone]   Jimbo Mares [phone] (therapist) [address] [facility]   Bc Mckeon (therapist) [phone]  [address] Lu[address] Gonzalez (therapist) [phone] [address] [facility]     [facility] [phone] [address] [facility] [place] [address]     Online Therapist:     [email or web address]     Free Guided Meditations   [email or web address]   [email or web address]    [email or web address]

## 2024-12-05 ENCOUNTER — TELEPHONE (OUTPATIENT)
Dept: DERMATOLOGY | Facility: CLINIC | Age: 69
End: 2024-12-05
Payer: MEDICARE

## 2024-12-05 NOTE — TELEPHONE ENCOUNTER
"----- Message from Jonnathan Davis sent at 12/4/2024  4:43 PM CST -----  Regarding: appt access  PATIENT CALL    Pt called to schedule EP appt for skin check. Last seen 02/2024, endorses red spot on the clavicle, dry patches on either side of the lips and recurrent "flap" on the nose. Please call back at [phone], morning appts preferred.  "

## 2024-12-06 ENCOUNTER — TELEPHONE (OUTPATIENT)
Dept: DERMATOLOGY | Facility: CLINIC | Age: 69
End: 2024-12-06
Payer: MEDICARE

## 2024-12-06 NOTE — TELEPHONE ENCOUNTER
----- Message from PLDT sent at 12/6/2024  9:18 AM CST -----  Regarding: Call Back  Contact: Pt [phone]  Pt is returning a  call back about a sooner appt please call

## 2025-02-13 ENCOUNTER — PATIENT MESSAGE (OUTPATIENT)
Dept: INTERNAL MEDICINE | Facility: CLINIC | Age: 70
End: 2025-02-13
Payer: MEDICARE

## 2025-02-13 DIAGNOSIS — K13.79 MOUTH PAIN: Primary | ICD-10-CM

## 2025-02-23 DIAGNOSIS — F33.42 RECURRENT MAJOR DEPRESSIVE DISORDER, IN FULL REMISSION: ICD-10-CM

## 2025-02-23 NOTE — TELEPHONE ENCOUNTER
No care due was identified.  [facility] Embedded Care Due Messages. Reference number: [id number].   2/23/2025 4:01:42 PM CST

## 2025-02-24 DIAGNOSIS — Z00.00 ENCOUNTER FOR MEDICARE ANNUAL WELLNESS EXAM: ICD-10-CM

## 2025-02-24 RX ORDER — VENLAFAXINE HYDROCHLORIDE 37.5 MG/1
37.5 CAPSULE, EXTENDED RELEASE ORAL DAILY
Qty: 90 CAPSULE | Refills: 3 | Status: SHIPPED | OUTPATIENT
Start: 2025-02-24 | End: 2026-02-24

## 2025-02-26 ENCOUNTER — LAB VISIT (OUTPATIENT)
Dept: LAB | Facility: OTHER | Age: 70
End: 2025-02-26
Payer: MEDICARE

## 2025-02-26 ENCOUNTER — OFFICE VISIT (OUTPATIENT)
Dept: INTERNAL MEDICINE | Facility: CLINIC | Age: 70
End: 2025-02-26
Payer: MEDICARE

## 2025-02-26 VITALS
DIASTOLIC BLOOD PRESSURE: 74 MMHG | WEIGHT: 179.56 LBS | OXYGEN SATURATION: 99 % | HEIGHT: 70 IN | SYSTOLIC BLOOD PRESSURE: 116 MMHG | BODY MASS INDEX: 25.71 KG/M2 | HEART RATE: 77 BPM

## 2025-02-26 DIAGNOSIS — R19.5 LOOSE STOOLS: ICD-10-CM

## 2025-02-26 DIAGNOSIS — J32.9 SINUSITIS, CHRONIC: ICD-10-CM

## 2025-02-26 DIAGNOSIS — Z13.220 ENCOUNTER FOR LIPID SCREENING FOR CARDIOVASCULAR DISEASE: ICD-10-CM

## 2025-02-26 DIAGNOSIS — R79.89 HIGH SERUM HIGH DENSITY LIPOPROTEIN (HDL): ICD-10-CM

## 2025-02-26 DIAGNOSIS — Z13.6 ENCOUNTER FOR LIPID SCREENING FOR CARDIOVASCULAR DISEASE: ICD-10-CM

## 2025-02-26 DIAGNOSIS — Z00.00 VISIT FOR ANNUAL HEALTH EXAMINATION: Primary | ICD-10-CM

## 2025-02-26 DIAGNOSIS — F33.42 RECURRENT MAJOR DEPRESSIVE DISORDER, IN FULL REMISSION: ICD-10-CM

## 2025-02-26 DIAGNOSIS — H81.399 PERIPHERAL VERTIGO: Primary | ICD-10-CM

## 2025-02-26 DIAGNOSIS — M85.80 OSTEOPENIA WITH HIGH RISK OF FRACTURE: ICD-10-CM

## 2025-02-26 LAB
ALBUMIN SERPL BCP-MCNC: 4.1 G/DL (ref 3.5–5.2)
ALP SERPL-CCNC: 62 U/L (ref 40–150)
ALT SERPL W/O P-5'-P-CCNC: 14 U/L (ref 10–44)
ANION GAP SERPL CALC-SCNC: 7 MMOL/L (ref 8–16)
AST SERPL-CCNC: 17 U/L (ref 10–40)
BASOPHILS # BLD AUTO: 0.05 K/UL (ref 0–0.2)
BASOPHILS NFR BLD: 0.7 % (ref 0–1.9)
BILIRUB SERPL-MCNC: 0.4 MG/DL (ref 0.1–1)
BUN SERPL-MCNC: 15 MG/DL (ref 8–23)
CALCIUM SERPL-MCNC: 9.5 MG/DL (ref 8.7–10.5)
CHLORIDE SERPL-SCNC: 108 MMOL/L (ref 95–110)
CHOLEST SERPL-MCNC: 183 MG/DL (ref 120–199)
CHOLEST/HDLC SERPL: 2.7 {RATIO} (ref 2–5)
CO2 SERPL-SCNC: 25 MMOL/L (ref 23–29)
CREAT SERPL-MCNC: 0.8 MG/DL (ref 0.5–1.4)
DIFFERENTIAL METHOD BLD: ABNORMAL
EOSINOPHIL # BLD AUTO: 0.2 K/UL (ref 0–0.5)
EOSINOPHIL NFR BLD: 2.6 % (ref 0–8)
ERYTHROCYTE [DISTWIDTH] IN BLOOD BY AUTOMATED COUNT: 12.9 % (ref 11.5–14.5)
EST. GFR  (NO RACE VARIABLE): >60 ML/MIN/1.73 M^2
GLUCOSE SERPL-MCNC: 102 MG/DL (ref 70–110)
HCT VFR BLD AUTO: 38.2 % (ref 37–48.5)
HDLC SERPL-MCNC: 68 MG/DL (ref 40–75)
HDLC SERPL: 37.2 % (ref 20–50)
HGB BLD-MCNC: 13.1 G/DL (ref 12–16)
IMM GRANULOCYTES # BLD AUTO: 0.02 K/UL (ref 0–0.04)
IMM GRANULOCYTES NFR BLD AUTO: 0.3 % (ref 0–0.5)
LDLC SERPL CALC-MCNC: 99.2 MG/DL (ref 63–159)
LYMPHOCYTES # BLD AUTO: 0.8 K/UL (ref 1–4.8)
LYMPHOCYTES NFR BLD: 10.2 % (ref 18–48)
MCH RBC QN AUTO: 31.7 PG (ref 27–31)
MCHC RBC AUTO-ENTMCNC: 34.3 G/DL (ref 32–36)
MCV RBC AUTO: 93 FL (ref 82–98)
MONOCYTES # BLD AUTO: 0.5 K/UL (ref 0.3–1)
MONOCYTES NFR BLD: 7.4 % (ref 4–15)
NEUTROPHILS # BLD AUTO: 5.8 K/UL (ref 1.8–7.7)
NEUTROPHILS NFR BLD: 78.8 % (ref 38–73)
NONHDLC SERPL-MCNC: 115 MG/DL
NRBC BLD-RTO: 0 /100 WBC
PLATELET # BLD AUTO: 255 K/UL (ref 150–450)
PMV BLD AUTO: 10.5 FL (ref 9.2–12.9)
POTASSIUM SERPL-SCNC: 4.1 MMOL/L (ref 3.5–5.1)
PROT SERPL-MCNC: 7.4 G/DL (ref 6–8.4)
RBC # BLD AUTO: 4.13 M/UL (ref 4–5.4)
SODIUM SERPL-SCNC: 140 MMOL/L (ref 136–145)
TRIGL SERPL-MCNC: 79 MG/DL (ref 30–150)
TSH SERPL DL<=0.005 MIU/L-ACNC: 1.89 UIU/ML (ref 0.4–4)
WBC # BLD AUTO: 7.32 K/UL (ref 3.9–12.7)

## 2025-02-26 PROCEDURE — 99213 OFFICE O/P EST LOW 20 MIN: CPT | Mod: PBBFAC

## 2025-02-26 PROCEDURE — 84443 ASSAY THYROID STIM HORMONE: CPT

## 2025-02-26 PROCEDURE — 85025 COMPLETE CBC W/AUTO DIFF WBC: CPT

## 2025-02-26 PROCEDURE — 80061 LIPID PANEL: CPT

## 2025-02-26 PROCEDURE — 36415 COLL VENOUS BLD VENIPUNCTURE: CPT

## 2025-02-26 PROCEDURE — 80053 COMPREHEN METABOLIC PANEL: CPT

## 2025-02-26 PROCEDURE — 99999 PR PBB SHADOW E&M-EST. PATIENT-LVL III: CPT | Mod: PBBFAC,,,

## 2025-02-26 NOTE — PROGRESS NOTES
"  HPI     Chief Complaint:  Chief Complaint   Patient presents with    Health Maintenance    Annual Exam       Arben De Luna is a 70 y.o. female with multiple medical diagnoses as listed in the medical history and problem list that presents for   Chief Complaint   Patient presents with    Health Maintenance    Annual Exam   .   Patient is not known to me with her last appointment in this department on Visit date not found.      HPI    LOV with PCP 3/2023. Overdue for annual    Previously seen for facial swelling, tx for sinus infection in the past. Saw ENT yesterday (Dr. Cullen), also has upcoming appt with endodontist for root canal. Dr. Cullen plans to do MRI/CT, also recommended hearing test. Also put pt on Augmentin and started last night. Per chart reviewed MRI, CT sinuses was ordered. Will complete. Denies numbness to left side of face but reports discomfort/tingling. No history of shingles. No excessive tearing or altered taste.     Wears hearing aids from [employer]     Tinnitis/Vertigo - no changes. Continues to have intermittent issues.     Non-fasting today, ate avocado toast.     Change in bowel movement past few months. No diarrhea but reports soft stools with change in consistency. Last week felt like stools were more formed and back to normal. No change in diet. Eating less meat. No dietary associations. No mucuous in stool. No blood in stool. Fee    Social Factors  Tobacco use: No  Ready to Quit: n/a  Alcohol: Yes socially twice per week  Intimate partner violence screening  "Do you feel safe in your current relationship?" Yes   Regular Exercise: No but will do yoga when she has back ache.     Depression  Over the past two weeks, have you felt down, depressed, or hopeless? No On Effexor, tried to wean herself off but when she stopped immediately developed increased depression. Currently taking 37.5mg and doing better. Feels like life is better and less stressful. Started after she had her kids. " "  Over the past two weeks, have you felt little interest or pleasure in doing things? No    Reproductive Health  Postmenopausal   STD screening in last year: declines  Last PAP: Maybe in her 50s when she as living in [place]. Never had abnormal one. Sister has history of endometrial CA.       CHD, HTN, DM2  CHD Risk Factors: advanced age (older than 55 for men, 65 for women)  Women 45 years and older should be screened for dyslipidemia if at increased risk of CHD  Women 20 to 45 years of age should be screened for dyslipidemia if at increased risk of CHD  Asymptomatic adults with sustained blood pressure greater than 135/80 mm Hg (treated or untreated) should be screened for type 2 diabetes mellitus    Estimated body mass index is 25.76 kg/m² as calculated from the following:    Height as of this encounter: 5' 10" (1.778 m).    Weight as of this encounter: 81.4 kg (179 lb 9 oz).    Screening  Mammogram needed: reviewed   scheduled 4/2025  Colonoscopy needed: reviewed   due in 2030  Osteoporosis screen needed: reviewed   duein 2026     Women 50 to 74 years of age should be screened for breast cancer with mammography biennially.  Women should be screened for cervical cancer with Pap tests beginning at 21 years of age. Low-risk women should receive Pap testing every three years. Co-testing for human papillomavirus is an option beginning at 30 years of age, and can extend the screening interval to five years. Cervical cancer screening should be discontinued at 65 years of age or after total hysterectomy if the woman has a benign gynecologic history  Adults 50 to 75 years of age should be screened for colorectal cancer with an FOBT annually, sigmoidoscopy every five years with an FOBT every three years, or colonoscopy every 10 years.  Women 65 years and older should be screened for osteoporosis. Women younger than 65 years should be screened if the risk of fracture is greater than or equal to that of a 65-year-old " white woman without additional risk factors.    Immunizations  up to date and documented    Assessment & Plan       1. Osteopenia with high risk of fracture  I recommend 1200mg of calcium and 1000 IU of vitamin D daily  I also recommend weight bearing exercises     Repeat next year  2. Recurrent major depressive disorder, in full remission  -     TSH; Future; Expected date: 02/26/2025  -     CBC Auto Differential; Future; Expected date: 02/26/2025  -     Comprehensive Metabolic Panel; Future; Expected date: 02/26/2025    3. Encounter for lipid screening for cardiovascular disease  -     Lipid Panel; Future; Expected date: 02/26/2025    4. High serum high density lipoprotein (HDL)  -     Lipid Panel; Future; Expected date: 02/26/2025    5. Loose stools  Trial daily metamucil  Keep food journal  Consider further work-up if no resolution  No associated abd pain, could be related to post viral illness    Consider Calprotectin, H.Pylori stool if no improvement  Consider referral to GI or sooner colonoscopy    6. Annual  Counseled on age appropriate medical preventative services including age appropriate cancer screenings, age appropriate eye and dental exams, over all nutritional health, need for a consistent exercise regimen, and an over all push towards maintaining a vigorous and active lifestyle.  Counseled on age appropriate vaccines and discussed upcoming health care needs based on age/gender. Discussed good sleep hygiene and stress management.    Nonfasting labs today  --------------------------------------------      Health Maintenance:  Health Maintenance         Date Due Completion Date    Mammogram 04/29/2025 4/29/2024    DEXA Scan 01/27/2026 1/27/2023    Lipid Panel 01/27/2028 1/27/2023    Colorectal Cancer Screening 07/31/2030 7/31/2023    TETANUS VACCINE 05/30/2033 5/30/2023            Discussed the importance of overdue vaccines which were offered during this encounter. Patient declined overdue vaccines at  this time, Health maintenance reviewed, and Lipid panel ordered    Follow Up:  Follow up in about 1 year (around 2/26/2026) for appt with PCP.    Exam     Review of Systems:  (as noted above)  Review of Systems    Physical Exam:   Physical Exam  Constitutional:       General: She is not in acute distress.     Appearance: Normal appearance. She is not ill-appearing, toxic-appearing or diaphoretic.   HENT:      Head: Normocephalic and atraumatic.      Nose: Nose normal.   Eyes:      Conjunctiva/sclera: Conjunctivae normal.      Pupils: Pupils are equal, round, and reactive to light.   Neck:      Thyroid: No thyroid mass, thyromegaly or thyroid tenderness.      Vascular: No carotid bruit.   Cardiovascular:      Rate and Rhythm: Normal rate and regular rhythm.      Pulses: Normal pulses.      Heart sounds: Normal heart sounds. No murmur heard.  Pulmonary:      Effort: Pulmonary effort is normal. No respiratory distress.      Breath sounds: Normal breath sounds. No wheezing.   Abdominal:      General: Bowel sounds are normal.      Palpations: Abdomen is soft. There is no hepatomegaly or mass.      Tenderness: There is no abdominal tenderness. There is no right CVA tenderness or left CVA tenderness. Negative signs include Ontiveros's sign.   Musculoskeletal:         General: Normal range of motion.      Cervical back: Normal range of motion and neck supple. No rigidity.      Right lower leg: No edema.      Left lower leg: No edema.   Lymphadenopathy:      Cervical: No cervical adenopathy.   Skin:     General: Skin is warm.      Capillary Refill: Capillary refill takes less than 2 seconds.      Findings: No bruising.   Neurological:      General: No focal deficit present.      Mental Status: She is alert and oriented to person, place, and time.      Gait: Gait normal.   Psychiatric:         Mood and Affect: Mood normal.       Vitals:    02/26/25 1500   BP: 116/74   BP Location: Right arm   Patient Position: Sitting   Pulse:  "77   SpO2: 99%   Weight: 81.4 kg (179 lb 9 oz)   Height: 5' 10" (1.778 m)      Body mass index is 25.76 kg/m².    Lab Results   Component Value Date    WBC 8.99 03/07/2023    HGB 14.0 03/07/2023    HCT 42.4 03/07/2023     03/07/2023    CHOL 198 01/27/2023    TRIG 56 01/27/2023    HDL 70 01/27/2023    ALT 15 01/27/2023    AST 18 01/27/2023     03/07/2023    K 3.7 03/07/2023     03/07/2023    CREATININE 0.8 03/07/2023    BUN 10 03/07/2023    CO2 24 03/07/2023    TSH 1.087 01/27/2023       The 10-year ASCVD risk score (Librado GRIDER, et al., 2019) is: 9.5%    Values used to calculate the score:      Age: 70 years      Sex: Female      Is Non- : No      Diabetic: No      Tobacco smoker: No      Systolic Blood Pressure: 116 mmHg      Is BP treated: No      HDL Cholesterol: 70 mg/dL      Total Cholesterol: 198 mg/dL    (Imaging have been independently reviewed)    History     Past Medical History:  Past Medical History:   Diagnosis Date    Actinic keratoses     Arthritis     right knee     Cherry angioma     History of colonic polyps - adenomatous polyps     1 tub adenoma on cscope 2018    Plantar fasciitis     Seborrheic keratoses     Squamous cell carcinoma in situ     right arm       Past Surgical History:  Past Surgical History:   Procedure Laterality Date    CATARACT EXTRACTION Right     COLONOSCOPY N/A 07/31/2023    Procedure: COLONOSCOPY;  Surgeon: Sterling Donovan MD;  Location: [address]);  Service: Endoscopy;  Laterality: N/A;  inst emailed/portal-RB  7/24 pre-call no answer; MB    EYE SURGERY Bilateral        Social History:  Social History[1]    Family History:  Family History   Problem Relation Name Age of Onset    Heart disease Mother      Stroke Mother      Hyperlipidemia Mother      Hypertension Mother      Cancer Father          throat CA, + tobacco    Depression Sister          bipolar depression    ALS Brother      No Known Problems Daughter      No " Known Problems Son      Cancer Sister          ? uterine    No Known Problems Sister      No Known Problems Sister      No Known Problems Sister      Other Sister          colitis    Cancer Brother          prostate CA     No Known Problems Brother      No Known Problems Brother      No Known Problems Brother      Melanoma Neg Hx         Allergies and Medications: (updated and reviewed)  Review of patient's allergies indicates:  No Known Allergies  Current Medications[2]    Patient Care Team:  Joanie To MD as PCP - General (Internal Medicine)  Yola Buckner AU.D as Nurse Practitioner (Audiology)  Jeffery Keen MD (Inactive) as Consulting Physician  Yola Buckner AU.D as Nurse Practitioner (Audiology)  WILI Tirado MD as Consulting Physician (Otolaryngology)         - The patient is given an After Visit Summary that lists all medications with directions, allergies, education, orders placed during this encounter and follow-up instructions.      - I have reviewed the patient's medical information including past medical, family, and social history sections including the medications and allergies.      - We discussed the patient's current medications.     This note was created by combination of typed  and MModal dictation.  Transcription errors may be present.  If there are any questions, please contact me.                 [1]   Social History  Socioeconomic History    Marital status:      Spouse name: shaq mata    Number of children: 2   Occupational History    Occupation: part time -    Tobacco Use    Smoking status: Former     Current packs/day: 0.25     Average packs/day: 0.3 packs/day for 4.0 years (1.0 ttl pk-yrs)     Types: Cigarettes    Smokeless tobacco: Never    Tobacco comments:     tob: in high school and quit early college   Substance and Sexual Activity    Alcohol use: Yes     Alcohol/week: 5.0 standard drinks of alcohol     Types: 5  Glasses of wine per week     Comment: 1-2 glasses of wine 5 times pe week    Drug use: Yes     Types: Marijuana     Comment: vape prn relaxation    Sexual activity: Not Currently     Partners: Male   Social History Narrative    From [place]     Moved to [place] [facility] summer 2018 - daughter and granddaughter live in [place]     Exercising - intermittently      Social Drivers of Health     Financial Resource Strain: Low Risk  (12/18/2023)    Overall Financial Resource Strain (CARDIA)     Difficulty of Paying Living Expenses: Not hard at all   Food Insecurity: No Food Insecurity (12/18/2023)    Hunger Vital Sign     Worried About Running Out of Food in the Last Year: Never true     Ran Out of Food in the Last Year: Never true   Transportation Needs: No Transportation Needs (12/18/2023)    PRAPARE - Transportation     Lack of Transportation (Medical): No     Lack of Transportation (Non-Medical): No   Physical Activity: Insufficiently Active (12/18/2023)    Exercise Vital Sign     Days of Exercise per Week: 1 day     Minutes of Exercise per Session: 60 min   Stress: No Stress Concern Present (12/18/2023)    [employer] - Occupational Stress Questionnaire     Feeling of Stress : Only a little   Housing Stability: Unknown (12/18/2023)    Housing Stability Vital Sign     Unable to Pay for Housing in the Last Year: No     Unstable Housing in the Last Year: No   [2]   Current Outpatient Medications   Medication Sig Dispense Refill    calcium carbonate (OS-SULEIMAN) 600 mg calcium (1,500 mg) Tab Take 600 mg by mouth 2 (two) times daily with meals.      venlafaxine (EFFEXOR-XR) 37.5 MG 24 hr capsule TAKE ONE CAPSULE BY MOUTH ONE TIME DAILY 90 capsule 1    venlafaxine (EFFEXOR-XR) 37.5 MG 24 hr capsule Take 1 capsule (37.5 mg total) by mouth once daily. 90 capsule 3    mupirocin (BACTROBAN) 2 % ointment Apply topically 3 (three) times daily. (Patient not taking: Reported on 2/26/2025) 2 g 0     No current  facility-administered medications for this visit.

## 2025-02-26 NOTE — PATIENT INSTRUCTIONS
Your bone density shows you have osteopenia. This is mild bone loss.   I recommend 1200mg of calcium and 1000 IU of vitamin D daily, dietary sources are best  I also recommend weight bearing exercises   Repeat in 2026    Daily metamucil x 2-4 weeks. If no improvement in bowels consider further work-up and possibly stool sample.   Keep food journal

## 2025-02-27 ENCOUNTER — RESULTS FOLLOW-UP (OUTPATIENT)
Dept: INTERNAL MEDICINE | Facility: CLINIC | Age: 70
End: 2025-02-27

## 2025-03-07 ENCOUNTER — HOSPITAL ENCOUNTER (OUTPATIENT)
Dept: RADIOLOGY | Facility: OTHER | Age: 70
Discharge: HOME | End: 2025-03-07
Attending: OTOLARYNGOLOGY
Payer: MEDICARE

## 2025-03-07 DIAGNOSIS — H81.399 PERIPHERAL VERTIGO: ICD-10-CM

## 2025-03-07 DIAGNOSIS — J32.9 SINUSITIS, CHRONIC: ICD-10-CM

## 2025-03-07 PROCEDURE — 25500020 PHARM REV CODE 255: Performed by: OTOLARYNGOLOGY

## 2025-03-07 PROCEDURE — 70486 CT MAXILLOFACIAL W/O DYE: CPT | Mod: 26,,, | Performed by: RADIOLOGY

## 2025-03-07 PROCEDURE — 70553 MRI BRAIN STEM W/O & W/DYE: CPT | Mod: 26,,, | Performed by: RADIOLOGY

## 2025-03-07 PROCEDURE — 70486 CT MAXILLOFACIAL W/O DYE: CPT | Mod: TC

## 2025-03-07 PROCEDURE — 70553 MRI BRAIN STEM W/O & W/DYE: CPT | Mod: TC

## 2025-03-07 PROCEDURE — A9585 GADOBUTROL INJECTION: HCPCS | Performed by: OTOLARYNGOLOGY

## 2025-03-07 RX ORDER — GADOBUTROL 604.72 MG/ML
8 INJECTION INTRAVENOUS
Status: COMPLETED | OUTPATIENT
Start: 2025-03-07 | End: 2025-03-07

## 2025-03-07 RX ADMIN — GADOBUTROL 8 ML: 604.72 INJECTION INTRAVENOUS at 08:03

## 2025-03-27 ENCOUNTER — OFFICE VISIT (OUTPATIENT)
Dept: DERMATOLOGY | Facility: CLINIC | Age: 70
End: 2025-03-27
Payer: MEDICARE

## 2025-03-27 DIAGNOSIS — L82.1 SEBORRHEIC KERATOSIS: ICD-10-CM

## 2025-03-27 DIAGNOSIS — L57.0 ACTINIC KERATOSIS: ICD-10-CM

## 2025-03-27 DIAGNOSIS — D48.5 NEOPLASM OF UNCERTAIN BEHAVIOR OF SKIN: Primary | ICD-10-CM

## 2025-03-27 DIAGNOSIS — Z85.828 HISTORY OF NONMELANOMA SKIN CANCER: ICD-10-CM

## 2025-03-27 DIAGNOSIS — Z12.83 SKIN CANCER SCREENING: ICD-10-CM

## 2025-03-27 DIAGNOSIS — L81.4 LENTIGINES: ICD-10-CM

## 2025-03-27 PROCEDURE — 88305 TISSUE EXAM BY PATHOLOGIST: CPT | Mod: 26,,, | Performed by: PATHOLOGY

## 2025-03-27 PROCEDURE — 88305 TISSUE EXAM BY PATHOLOGIST: CPT | Mod: TC | Performed by: DERMATOLOGY

## 2025-03-27 NOTE — PROGRESS NOTES
"  Patient Information  Name: Arben De Luna  : 1955  MRN: [id number]     Referring Physician:  No ref. provider found   Primary Care Physician:  Joanie To MD   Date of Visit: 3/27/25      Subjective:     History of Present lllness:    Arben De Luna is a 70 y.o. female who presents with a chief complaint of moles, spot, and lesion.  This is a high risk patient with a personal history of nonmelanoma skin cancer who is here today to check for the development of new lesions.  Patient is here today for a "mole" check.     Today, patient complains of:    Location: chest  Duration: 1 year +  Signs/Symptoms: spot that is not going away, itchy in the morning  Exacerbating factors: none  Relieving factors/Prior treatments: none    Location: left cheek  Duration: 1 year +  Signs/Symptoms: red and inflamed spot that is not going away  Exacerbating factors: none  Relieving factors/Prior treatments: none    Patient was last seen: 2024.  Prior notes by myself reviewed.   Clinical documentation obtained by nursing staff reviewed.    Review of Systems    Objective:   Physical Exam   Constitutional: She appears well-developed and well-nourished. No distress.   HENT:   Mouth/Throat: Lips normal.    Eyes: Lids are normal.  No conjunctival no injection.   Neurological: She is alert and oriented to person, place, and time. She is not disoriented.   Psychiatric: She has a normal mood and affect.   Skin:   Areas Examined (abnormalities noted in diagram):   Scalp / Hair Palpated and Inspected  Head / Face Inspection Performed  Neck Inspection Performed  Chest / Axilla Inspection Performed  Abdomen Inspection Performed  Genitals / Buttocks / Groin Inspection Performed  Back Inspection Performed  RUE Inspected  LUE Inspection Performed  RLE Inspected  LLE Inspection Performed  Nails and Digits Inspection Performed                 Diagram Legend     Erythematous scaling macule/papule c/w actinic keratosis       Vascular " papule c/w angioma      Pigmented verrucoid papule/plaque c/w seborrheic keratosis      Yellow umbilicated papule c/w sebaceous hyperplasia      Irregularly shaped tan macule c/w lentigo     1-2 mm smooth white papules consistent with Milia      Movable subcutaneous cyst with punctum c/w epidermal inclusion cyst      Subcutaneous movable cyst c/w pilar cyst      Firm pink to brown papule c/w dermatofibroma      Pedunculated fleshy papule(s) c/w skin tag(s)      Evenly pigmented macule c/w junctional nevus     Mildly variegated pigmented, slightly irregular-bordered macule c/w mildly atypical nevus      Flesh colored to evenly pigmented papule c/w intradermal nevus       Pink pearly papule/plaque c/w basal cell carcinoma      Erythematous hyperkeratotic cursted plaque c/w SCC      Surgical scar with no sign of skin cancer recurrence      Open and closed comedones      Inflammatory papules and pustules      Verrucoid papule consistent consistent with wart     Erythematous eczematous patches and plaques     Dystrophic onycholytic nail with subungual debris c/w onychomycosis     Umbilicated papule    Erythematous-base heme-crusted tan verrucoid plaque consistent with inflamed seborrheic keratosis     Erythematous Silvery Scaling Plaque c/w Psoriasis     See annotation          [] Data reviewed  [] Prior external notes reviewed  [] Independent review of test  [] Management discussed with another provider  [] Independent historian    Assessment / Plan:      Pathology Orders:       Normal Orders This Visit    Specimen to Pathology, Dermatology     Questions:    Procedure Type: Dermatology and skin neoplasms    Number of Specimens: 1    ------------------------: -------------------------    Spec 1 Procedure: Shave Biopsy    Spec 1 Clinical Impression: r/o sccis vs sbcc    Spec 1 Source: midline upper chest    Clinical Information: see EPIC    Clinical History: see EPIC    Specimen Source: Skin    Release to patient:  Immediate    Send normal result to authorizing provider's In Basket if patient is active on MyChart: Yes          Neoplasm of uncertain behavior of skin  -     Specimen to Pathology, Dermatology    Shave biopsy procedure note:  Risk, benefits, and alternatives of biopsy are discussed with the patient, including risk of infection, scar, recurrence, and need for additional treatment of site. The patient agrees to the procedure by verbal consent. The area is marked and prepped with alcohol.  Approximately 1 mL of lidocaine 1% with epinephrine is used for local anesthesia. A sharp blade is used to remove a portion of the lesion. The specimen is sent for pathology. Hemostasis is obtained with aluminum chloride and/or monopolar hyfrecation if needed. The area is then dressed and bandaged. The patient tolerated the procedure well without adverse event. Written instructions on wound care were given and were reviewed with the patient, who is to call for any signs of bleeding or infection. The patient will be notified of the pathology results.    Actinic keratosis  Cryosurgery procedure note:  Risk, benefits, and alternatives of cryosurgery are discussed with the patient, including but not limited to the risks of hypopigmentation, hyperpigmentation, scar, infection, recurrence of lesion(s), development of new lesion(s), and need for additional treatment of the lesion(s). Verbal consent obtained from patient. Liquid nitrogen cryosurgery applied to 10 lesion(s) to produce a freeze injury. Counseled patient that blisters may form, and instructed patient on wound care with gentle cleansing and use of Vaseline ointment to keep moist until healed. Handout was provided, and patient was instructed to return to clinic in 1-2 months if lesions do not completely resolve.    Lentigines  These are benign sun spots which should be monitored for changes. Daily sun protection will reduce the number of new lesions.   Recommend using a  broad-spectrum, water-resistant sunscreen with SPF of 30 or higher--reapply every 2 hours. Seek shade, wear sun-protective clothing, and perform regular skin self-exams.    Seborrheic keratosis   These are benign, inherited growths without a malignant potential. Reassurance given to patient. No treatment is necessary.    History of nonmelanoma skin cancer   - stable and chronic  Area(s) of previous nonmelanoma skin cancer evaluated with no evidence of recurrence. Reassurance provided.  Recommend using a broad-spectrum, water-resistant sunscreen with SPF of 30 or higher--reapply every 2 hours. Seek shade, wear sun-protective clothing, and perform regular skin self-exams.    Skin cancer screening  Total body skin examination performed today as noted in physical exam. Suspicious lesion(s) were noted and/or biopsied as above.  Recommend using a broad-spectrum, water-resistant sunscreen with SPF of 30 or higher--reapply every 2 hours. Seek shade, wear sun-protective clothing, and perform regular skin self-exams.             Follow up in about 1 year (around 3/27/2026) for TBSE, or sooner dependent on pathology results.      Heydi Leroy MD, FAAD  [facility]         no

## 2025-03-27 NOTE — PATIENT INSTRUCTIONS
CRYOSURGERY      Your doctor has used a method called cryosurgery to treat your skin condition. Cryosurgery refers to the use of very cold substances to treat a variety of skin conditions such as warts, precancerous skin lesions, molluscum contagiosum, sun spots, and several benign growths. The substance we use in cryosurgery is liquid nitrogen and is so cold (-195 degrees Celsius) that it burns when administered.     Following treatment in the office, the skin may immediately itch or burn and become red. You may find the area around the lesion is affected as well. It is sometimes necessary to treat not only the lesion, but also a small area of the surrounding normal skin to achieve a good response.     A blister, and even a blood-filled blister, may form after treatment.   This is a normal response. If the blister is painful, it is acceptable to sterilize a needle with rubbing alcohol and gently pop the blister. It is important that you gently wash the area with soap and warm water as the blister fluid may contain wart virus if a wart was treated. Do not remove the roof of the blister.     The area treated can take anywhere from 1-3 weeks to heal. Healing time depends on the kind of skin lesion treated, the location, and how aggressively the lesion was treated. It is recommended that the areas treated are covered with Vaseline and a bandaid to improve healing. If a bandaid is not practical, Vaseline applied several times per day will do. Keeping these areas moist will speed the healing time.    Treatment with liquid nitrogen can leave a scar. In dark skin, it may be a light or dark scar; in light skin it may be a white or pink scar. These will generally fade with time, but may never go away completely.     If you have any concerns after your treatment, please message us via [facility] or call us at [phone].     Biopsy Wound Care Instructions    Leave the bandage on for 24 hours without getting it wet.   Clean  the area once a day with a gentle soap and water, then pat dry and apply Vaseline and a bandaid.  The site should be kept moist with Vaseline at all times to improve healing. Reapply a thick coating as needed. Do not let the site air out or form a scab, as this will delay healing and worsen scarring.  If any bleeding or oozing occurs once you return home, apply firm pressure to the area for 30 minutes straight without peeking. If bleeding continues, call the office immediately.  Please message us via [facility], call us at [phone], or return to the office at any sign of increasing redness, swelling, tenderness, pain, heat, yellow drainage/discharge, or continued bleeding.      Receiving Your Pathology Results    Your pathology results will be released to you on [facility] at the same time that Dr. Leroy receives them.   Dr. Leroy will then message you with her interpretation of the results and/or with the plan going forward.  If you do not use [facility] or if your pathology results require more of an explanation, you will receive your results via a phone call.  If 2 weeks go by and you have not received your results, please message us via [facility] or call us at [phone] to inform us.      Hydrocolloid Bandage    A hydrocolloid bandage also can used in place of Vaseline and a bandaid. It is not to be used with Vaseline.  A hydrocolloid bandage is an occlusive, waterproof dressing that will protect the wound and provide an optimal healing environment.   These bandages can be found at your local pharmacy in the first aid section or on [employer] (see below for examples).  Apply the bandage to clean, dry skin, making sure that the wound bed is in the center of the bandage.   Once it is applied, press and hold your hand on top of the bandage to warm it and help it adhere to the skin.  Please note: The area where the bandage adheres to the wound bed will become white and puffy. This is not infection and is a  normal part of the healing process.  Do not change the bandage until the edges roll up and it starts to peel away.  When bandage is ready to be changed, remove carefully and slowly. Run it under water if necessary to help it release from the skin.  Wash wound with a gentle cleanser and fingertips.  Make sure area is completely dry before applying a new bandage.  Repeat process until fresh pink skin covers the wound bed or until bandage no longer becomes white and puffy.       Examples:  Nexcare Advanced Healing [place] Bandages  [facility] Clear Advanced Healing Hydrocolloid Bandages  Band-Aid® Hydro Seal All-Purpose Adhesive Bandages  [facility] Hydrocolloid Spot Bandages  DuoDERM® Extra Thin Dressing (can be cut to size)

## 2025-03-28 ENCOUNTER — TELEPHONE (OUTPATIENT)
Dept: DERMATOLOGY | Facility: CLINIC | Age: 70
End: 2025-03-28
Payer: MEDICARE

## 2025-03-28 ENCOUNTER — PATIENT MESSAGE (OUTPATIENT)
Dept: DERMATOLOGY | Facility: CLINIC | Age: 70
End: 2025-03-28
Payer: MEDICARE

## 2025-03-28 DIAGNOSIS — K13.79 NODULE OF CHEEK: Primary | ICD-10-CM

## 2025-04-01 ENCOUNTER — RESULTS FOLLOW-UP (OUTPATIENT)
Dept: DERMATOLOGY | Facility: CLINIC | Age: 70
End: 2025-04-01

## 2025-04-01 NOTE — TELEPHONE ENCOUNTER
Nodule of cheek  -     Ambulatory referral/consult to ENT; Future; Expected date: 04/04/2025

## 2025-05-02 ENCOUNTER — TELEPHONE (OUTPATIENT)
Dept: OTOLARYNGOLOGY | Facility: CLINIC | Age: 70
End: 2025-05-02
Payer: MEDICARE

## 2025-05-19 ENCOUNTER — HOSPITAL ENCOUNTER (OUTPATIENT)
Dept: RADIOLOGY | Facility: OTHER | Age: 70
Discharge: HOME | End: 2025-05-19
Attending: INTERNAL MEDICINE
Payer: MEDICARE

## 2025-05-19 DIAGNOSIS — Z12.31 ENCOUNTER FOR SCREENING MAMMOGRAM FOR BREAST CANCER: ICD-10-CM

## 2025-05-19 PROCEDURE — 77063 BREAST TOMOSYNTHESIS BI: CPT | Mod: TC

## 2025-05-19 PROCEDURE — 77067 SCR MAMMO BI INCL CAD: CPT | Mod: 26,,, | Performed by: RADIOLOGY

## 2025-05-19 PROCEDURE — 77063 BREAST TOMOSYNTHESIS BI: CPT | Mod: 26,,, | Performed by: RADIOLOGY

## 2025-05-20 ENCOUNTER — RESULTS FOLLOW-UP (OUTPATIENT)
Dept: INTERNAL MEDICINE | Facility: CLINIC | Age: 70
End: 2025-05-20

## 2025-07-24 ENCOUNTER — PATIENT MESSAGE (OUTPATIENT)
Dept: DERMATOLOGY | Facility: CLINIC | Age: 70
End: 2025-07-24
Payer: MEDICARE